# Patient Record
Sex: MALE | Race: WHITE | NOT HISPANIC OR LATINO
[De-identification: names, ages, dates, MRNs, and addresses within clinical notes are randomized per-mention and may not be internally consistent; named-entity substitution may affect disease eponyms.]

---

## 2017-02-07 ENCOUNTER — RECORD ABSTRACTING (OUTPATIENT)
Age: 57
End: 2017-02-07

## 2017-02-07 DIAGNOSIS — M25.562 PAIN IN LEFT KNEE: ICD-10-CM

## 2017-02-07 DIAGNOSIS — Z82.61 FAMILY HISTORY OF ARTHRITIS: ICD-10-CM

## 2017-02-07 DIAGNOSIS — M11.20 OTHER CHONDROCALCINOSIS, UNSPECIFIED SITE: ICD-10-CM

## 2017-02-07 DIAGNOSIS — Z82.62 FAMILY HISTORY OF OSTEOPOROSIS: ICD-10-CM

## 2017-02-07 DIAGNOSIS — M87.9 OSTEONECROSIS, UNSPECIFIED: ICD-10-CM

## 2017-02-13 ENCOUNTER — FORM ENCOUNTER (OUTPATIENT)
Age: 57
End: 2017-02-13

## 2017-02-14 ENCOUNTER — APPOINTMENT (OUTPATIENT)
Dept: CT IMAGING | Facility: HOSPITAL | Age: 57
End: 2017-02-14

## 2017-02-14 ENCOUNTER — OUTPATIENT (OUTPATIENT)
Dept: OUTPATIENT SERVICES | Facility: HOSPITAL | Age: 57
LOS: 1 days | End: 2017-02-14
Payer: COMMERCIAL

## 2017-02-14 DIAGNOSIS — Z98.89 OTHER SPECIFIED POSTPROCEDURAL STATES: Chronic | ICD-10-CM

## 2017-02-14 PROCEDURE — 71250 CT THORAX DX C-: CPT

## 2017-02-16 ENCOUNTER — APPOINTMENT (OUTPATIENT)
Dept: HEMATOLOGY ONCOLOGY | Facility: CLINIC | Age: 57
End: 2017-02-16

## 2017-02-16 VITALS — TEMPERATURE: 98.4 F | SYSTOLIC BLOOD PRESSURE: 134 MMHG | DIASTOLIC BLOOD PRESSURE: 66 MMHG | HEART RATE: 60 BPM

## 2017-02-16 DIAGNOSIS — K76.0 FATTY (CHANGE OF) LIVER, NOT ELSEWHERE CLASSIFIED: ICD-10-CM

## 2017-02-16 DIAGNOSIS — Z47.1 AFTERCARE FOLLOWING JOINT REPLACEMENT SURGERY: ICD-10-CM

## 2017-05-09 ENCOUNTER — APPOINTMENT (OUTPATIENT)
Dept: MRI IMAGING | Facility: CLINIC | Age: 57
End: 2017-05-09

## 2017-05-09 ENCOUNTER — OUTPATIENT (OUTPATIENT)
Dept: OUTPATIENT SERVICES | Facility: HOSPITAL | Age: 57
LOS: 1 days | End: 2017-05-09
Payer: COMMERCIAL

## 2017-05-09 DIAGNOSIS — Z98.89 OTHER SPECIFIED POSTPROCEDURAL STATES: Chronic | ICD-10-CM

## 2017-05-09 DIAGNOSIS — Z00.8 ENCOUNTER FOR OTHER GENERAL EXAMINATION: ICD-10-CM

## 2017-05-09 PROCEDURE — 72148 MRI LUMBAR SPINE W/O DYE: CPT

## 2017-05-22 ENCOUNTER — APPOINTMENT (OUTPATIENT)
Dept: HEMATOLOGY ONCOLOGY | Facility: CLINIC | Age: 57
End: 2017-05-22

## 2017-07-05 ENCOUNTER — APPOINTMENT (OUTPATIENT)
Dept: ORTHOPEDIC SURGERY | Facility: CLINIC | Age: 57
End: 2017-07-05

## 2017-07-05 VITALS
DIASTOLIC BLOOD PRESSURE: 72 MMHG | HEART RATE: 71 BPM | BODY MASS INDEX: 34.37 KG/M2 | HEIGHT: 73.5 IN | SYSTOLIC BLOOD PRESSURE: 122 MMHG | WEIGHT: 265 LBS

## 2017-07-05 DIAGNOSIS — M51.36 OTHER INTERVERTEBRAL DISC DEGENERATION, LUMBAR REGION: ICD-10-CM

## 2017-07-05 DIAGNOSIS — M48.06 SPINAL STENOSIS, LUMBAR REGION: ICD-10-CM

## 2017-08-16 ENCOUNTER — OUTPATIENT (OUTPATIENT)
Dept: OUTPATIENT SERVICES | Facility: HOSPITAL | Age: 57
LOS: 1 days | End: 2017-08-16
Payer: COMMERCIAL

## 2017-08-16 DIAGNOSIS — Z98.89 OTHER SPECIFIED POSTPROCEDURAL STATES: Chronic | ICD-10-CM

## 2017-08-16 DIAGNOSIS — M54.16 RADICULOPATHY, LUMBAR REGION: ICD-10-CM

## 2017-08-16 PROCEDURE — 62323 NJX INTERLAMINAR LMBR/SAC: CPT

## 2017-08-16 PROCEDURE — 77003 FLUOROGUIDE FOR SPINE INJECT: CPT

## 2017-09-26 ENCOUNTER — APPOINTMENT (OUTPATIENT)
Dept: HEMATOLOGY ONCOLOGY | Facility: CLINIC | Age: 57
End: 2017-09-26
Payer: COMMERCIAL

## 2017-09-26 VITALS
BODY MASS INDEX: 31.63 KG/M2 | SYSTOLIC BLOOD PRESSURE: 120 MMHG | TEMPERATURE: 97.7 F | DIASTOLIC BLOOD PRESSURE: 70 MMHG | HEART RATE: 68 BPM | WEIGHT: 243 LBS

## 2017-09-26 PROCEDURE — 99214 OFFICE O/P EST MOD 30 MIN: CPT

## 2018-01-04 ENCOUNTER — APPOINTMENT (OUTPATIENT)
Dept: HEMATOLOGY ONCOLOGY | Facility: CLINIC | Age: 58
End: 2018-01-04

## 2018-01-18 ENCOUNTER — LABORATORY RESULT (OUTPATIENT)
Age: 58
End: 2018-01-18

## 2018-01-19 LAB
CARDIOLIPIN AB SER IA-ACNC: NEGATIVE
CARDIOLIPIN IGM SER-MCNC: 9.6 MPL
CARDIOLIPIN IGM SER-MCNC: <5 GPL
DEPRECATED CARDIOLIPIN IGA SER: <5 APL

## 2018-01-22 ENCOUNTER — OTHER (OUTPATIENT)
Age: 58
End: 2018-01-22

## 2018-01-23 LAB
B2 GLYCOPROT1 IGA SERPL IA-ACNC: <5 SAU
B2 GLYCOPROT1 IGG SER-ACNC: <5 SGU
B2 GLYCOPROT1 IGM SER-ACNC: <5 SMU

## 2018-01-24 ENCOUNTER — FORM ENCOUNTER (OUTPATIENT)
Age: 58
End: 2018-01-24

## 2018-01-25 ENCOUNTER — OUTPATIENT (OUTPATIENT)
Dept: OUTPATIENT SERVICES | Facility: HOSPITAL | Age: 58
LOS: 1 days | End: 2018-01-25
Payer: COMMERCIAL

## 2018-01-25 ENCOUNTER — APPOINTMENT (OUTPATIENT)
Dept: ULTRASOUND IMAGING | Facility: CLINIC | Age: 58
End: 2018-01-25
Payer: COMMERCIAL

## 2018-01-25 DIAGNOSIS — Z98.89 OTHER SPECIFIED POSTPROCEDURAL STATES: Chronic | ICD-10-CM

## 2018-01-25 DIAGNOSIS — I82.401 ACUTE EMBOLISM AND THROMBOSIS OF UNSPECIFIED DEEP VEINS OF RIGHT LOWER EXTREMITY: ICD-10-CM

## 2018-01-25 DIAGNOSIS — Z00.8 ENCOUNTER FOR OTHER GENERAL EXAMINATION: ICD-10-CM

## 2018-01-25 PROCEDURE — 93970 EXTREMITY STUDY: CPT | Mod: 26

## 2018-01-25 PROCEDURE — 93970 EXTREMITY STUDY: CPT

## 2018-01-31 ENCOUNTER — OUTPATIENT (OUTPATIENT)
Dept: OUTPATIENT SERVICES | Facility: HOSPITAL | Age: 58
LOS: 1 days | End: 2018-01-31
Payer: COMMERCIAL

## 2018-01-31 DIAGNOSIS — Z98.89 OTHER SPECIFIED POSTPROCEDURAL STATES: Chronic | ICD-10-CM

## 2018-01-31 DIAGNOSIS — M54.16 RADICULOPATHY, LUMBAR REGION: ICD-10-CM

## 2018-01-31 PROCEDURE — 62323 NJX INTERLAMINAR LMBR/SAC: CPT

## 2018-01-31 PROCEDURE — 77003 FLUOROGUIDE FOR SPINE INJECT: CPT

## 2018-02-14 ENCOUNTER — APPOINTMENT (OUTPATIENT)
Dept: HEMATOLOGY ONCOLOGY | Facility: CLINIC | Age: 58
End: 2018-02-14
Payer: COMMERCIAL

## 2018-02-14 VITALS
SYSTOLIC BLOOD PRESSURE: 130 MMHG | DIASTOLIC BLOOD PRESSURE: 80 MMHG | WEIGHT: 247 LBS | TEMPERATURE: 97.2 F | HEART RATE: 68 BPM | BODY MASS INDEX: 32.15 KG/M2

## 2018-02-14 DIAGNOSIS — R91.1 SOLITARY PULMONARY NODULE: ICD-10-CM

## 2018-02-14 PROCEDURE — 99214 OFFICE O/P EST MOD 30 MIN: CPT

## 2018-04-20 ENCOUNTER — APPOINTMENT (OUTPATIENT)
Dept: ORTHOPEDIC SURGERY | Facility: CLINIC | Age: 58
End: 2018-04-20
Payer: COMMERCIAL

## 2018-04-20 VITALS — BODY MASS INDEX: 30.46 KG/M2 | HEIGHT: 75 IN | WEIGHT: 245 LBS

## 2018-04-20 DIAGNOSIS — Z85.9 PERSONAL HISTORY OF MALIGNANT NEOPLASM, UNSPECIFIED: ICD-10-CM

## 2018-04-20 DIAGNOSIS — Z86.39 PERSONAL HISTORY OF OTHER ENDOCRINE, NUTRITIONAL AND METABOLIC DISEASE: ICD-10-CM

## 2018-04-20 DIAGNOSIS — Z87.39 PERSONAL HISTORY OF OTHER DISEASES OF THE MUSCULOSKELETAL SYSTEM AND CONNECTIVE TISSUE: ICD-10-CM

## 2018-04-20 DIAGNOSIS — Z82.61 FAMILY HISTORY OF ARTHRITIS: ICD-10-CM

## 2018-04-20 PROCEDURE — 99213 OFFICE O/P EST LOW 20 MIN: CPT

## 2018-04-20 PROCEDURE — 73562 X-RAY EXAM OF KNEE 3: CPT | Mod: LT

## 2018-05-17 ENCOUNTER — APPOINTMENT (OUTPATIENT)
Dept: SURGERY | Facility: CLINIC | Age: 58
End: 2018-05-17
Payer: OTHER MISCELLANEOUS

## 2018-05-17 ENCOUNTER — OTHER (OUTPATIENT)
Age: 58
End: 2018-05-17

## 2018-05-17 VITALS
SYSTOLIC BLOOD PRESSURE: 137 MMHG | RESPIRATION RATE: 16 BRPM | WEIGHT: 235 LBS | TEMPERATURE: 98.2 F | DIASTOLIC BLOOD PRESSURE: 78 MMHG | BODY MASS INDEX: 29.22 KG/M2 | OXYGEN SATURATION: 96 % | HEART RATE: 70 BPM | HEIGHT: 75 IN

## 2018-05-17 DIAGNOSIS — K40.90 UNILATERAL INGUINAL HERNIA, W/OUT OBSTRUCTION OR GANGRENE, NOT SPECIFIED AS RECURRENT: ICD-10-CM

## 2018-05-17 DIAGNOSIS — F17.200 NICOTINE DEPENDENCE, UNSPECIFIED, UNCOMPLICATED: ICD-10-CM

## 2018-05-17 PROCEDURE — 99244 OFF/OP CNSLTJ NEW/EST MOD 40: CPT

## 2018-05-21 ENCOUNTER — OTHER (OUTPATIENT)
Age: 58
End: 2018-05-21

## 2018-05-25 ENCOUNTER — OUTPATIENT (OUTPATIENT)
Dept: OUTPATIENT SERVICES | Facility: HOSPITAL | Age: 58
LOS: 1 days | End: 2018-05-25
Payer: COMMERCIAL

## 2018-05-25 VITALS
HEART RATE: 72 BPM | OXYGEN SATURATION: 98 % | DIASTOLIC BLOOD PRESSURE: 76 MMHG | HEIGHT: 75 IN | SYSTOLIC BLOOD PRESSURE: 118 MMHG | WEIGHT: 231.93 LBS | TEMPERATURE: 98 F | RESPIRATION RATE: 16 BRPM

## 2018-05-25 DIAGNOSIS — K40.90 UNILATERAL INGUINAL HERNIA, WITHOUT OBSTRUCTION OR GANGRENE, NOT SPECIFIED AS RECURRENT: ICD-10-CM

## 2018-05-25 DIAGNOSIS — Z96.652 PRESENCE OF LEFT ARTIFICIAL KNEE JOINT: Chronic | ICD-10-CM

## 2018-05-25 DIAGNOSIS — I82.409 ACUTE EMBOLISM AND THROMBOSIS OF UNSPECIFIED DEEP VEINS OF UNSPECIFIED LOWER EXTREMITY: ICD-10-CM

## 2018-05-25 DIAGNOSIS — Z98.89 OTHER SPECIFIED POSTPROCEDURAL STATES: Chronic | ICD-10-CM

## 2018-05-25 DIAGNOSIS — Z01.818 ENCOUNTER FOR OTHER PREPROCEDURAL EXAMINATION: ICD-10-CM

## 2018-05-25 LAB
ALBUMIN SERPL ELPH-MCNC: 4.3 G/DL — SIGNIFICANT CHANGE UP (ref 3.3–5)
ALP SERPL-CCNC: 82 U/L — SIGNIFICANT CHANGE UP (ref 40–120)
ALT FLD-CCNC: 14 U/L — SIGNIFICANT CHANGE UP (ref 10–45)
ANION GAP SERPL CALC-SCNC: 14 MMOL/L — SIGNIFICANT CHANGE UP (ref 5–17)
AST SERPL-CCNC: 13 U/L — SIGNIFICANT CHANGE UP (ref 10–40)
BILIRUB SERPL-MCNC: 0.4 MG/DL — SIGNIFICANT CHANGE UP (ref 0.2–1.2)
BUN SERPL-MCNC: 14 MG/DL — SIGNIFICANT CHANGE UP (ref 7–23)
CALCIUM SERPL-MCNC: 9.5 MG/DL — SIGNIFICANT CHANGE UP (ref 8.4–10.5)
CHLORIDE SERPL-SCNC: 103 MMOL/L — SIGNIFICANT CHANGE UP (ref 96–108)
CO2 SERPL-SCNC: 23 MMOL/L — SIGNIFICANT CHANGE UP (ref 22–31)
CREAT SERPL-MCNC: 0.82 MG/DL — SIGNIFICANT CHANGE UP (ref 0.5–1.3)
GLUCOSE SERPL-MCNC: 88 MG/DL — SIGNIFICANT CHANGE UP (ref 70–99)
HCT VFR BLD CALC: 43.6 % — SIGNIFICANT CHANGE UP (ref 39–50)
HGB BLD-MCNC: 14.8 G/DL — SIGNIFICANT CHANGE UP (ref 13–17)
MCHC RBC-ENTMCNC: 30.6 PG — SIGNIFICANT CHANGE UP (ref 27–34)
MCHC RBC-ENTMCNC: 33.9 GM/DL — SIGNIFICANT CHANGE UP (ref 32–36)
MCV RBC AUTO: 90.1 FL — SIGNIFICANT CHANGE UP (ref 80–100)
PLATELET # BLD AUTO: 275 K/UL — SIGNIFICANT CHANGE UP (ref 150–400)
POTASSIUM SERPL-MCNC: 4 MMOL/L — SIGNIFICANT CHANGE UP (ref 3.5–5.3)
POTASSIUM SERPL-SCNC: 4 MMOL/L — SIGNIFICANT CHANGE UP (ref 3.5–5.3)
PROT SERPL-MCNC: 7.4 G/DL — SIGNIFICANT CHANGE UP (ref 6–8.3)
RBC # BLD: 4.84 M/UL — SIGNIFICANT CHANGE UP (ref 4.2–5.8)
RBC # FLD: 14 % — SIGNIFICANT CHANGE UP (ref 10.3–14.5)
SODIUM SERPL-SCNC: 140 MMOL/L — SIGNIFICANT CHANGE UP (ref 135–145)
WBC # BLD: 10.32 K/UL — SIGNIFICANT CHANGE UP (ref 3.8–10.5)
WBC # FLD AUTO: 10.32 K/UL — SIGNIFICANT CHANGE UP (ref 3.8–10.5)

## 2018-05-25 PROCEDURE — 85027 COMPLETE CBC AUTOMATED: CPT

## 2018-05-25 PROCEDURE — G0463: CPT

## 2018-05-25 PROCEDURE — 80053 COMPREHEN METABOLIC PANEL: CPT

## 2018-05-25 RX ORDER — LIDOCAINE HCL 20 MG/ML
0.2 VIAL (ML) INJECTION ONCE
Qty: 0 | Refills: 0 | Status: DISCONTINUED | OUTPATIENT
Start: 2018-06-01 | End: 2018-06-16

## 2018-05-25 RX ORDER — CEFAZOLIN SODIUM 1 G
2000 VIAL (EA) INJECTION ONCE
Qty: 0 | Refills: 0 | Status: DISCONTINUED | OUTPATIENT
Start: 2018-06-01 | End: 2018-06-16

## 2018-05-25 RX ORDER — ACETAMINOPHEN 500 MG
1000 TABLET ORAL ONCE
Qty: 0 | Refills: 0 | Status: COMPLETED | OUTPATIENT
Start: 2018-06-01 | End: 2018-06-01

## 2018-05-25 RX ORDER — SODIUM CHLORIDE 9 MG/ML
3 INJECTION INTRAMUSCULAR; INTRAVENOUS; SUBCUTANEOUS EVERY 8 HOURS
Qty: 0 | Refills: 0 | Status: DISCONTINUED | OUTPATIENT
Start: 2018-06-01 | End: 2018-06-16

## 2018-05-25 NOTE — H&P PST ADULT - PMH
Basal cell carcinoma  removed  Bronchitis  winter 2014  DVT (deep venous thrombosis)  "right lower extremity, 2015 after meniscus repair & treated with Xarelto" & still on  GERD (gastroesophageal reflux disease)    Hyperlipidemia    Pneumonia Basal cell carcinoma  removed  Bronchitis  winter 2014  DVT (deep venous thrombosis)  "right lower extremity,after meniscus repair  08/2014 & Had Hemato consult((Prothrombin gene mutation study was normal+LA),treated with Xarelto" & currently on xarelto.  GERD (gastroesophageal reflux disease)    Hyperlipidemia    Pneumonia

## 2018-05-25 NOTE — H&P PST ADULT - PSH
H/O total knee replacement, left  2016  S/P knee surgery  2013 left meniscus  2015-right meniscus  S/P laminectomy  1990

## 2018-05-25 NOTE — H&P PST ADULT - HISTORY OF PRESENT ILLNESS
58 year old male presents today with a " right inguinal hernia since 05/2017 while lifting some heavy weight while working & noticed its bothering too much" & scheduled for right inguinal hernia repair on 06/01/18.

## 2018-05-25 NOTE — H&P PST ADULT - NSANTHOSAYNRD_GEN_A_CORE
No. BRIDGER screening performed.  STOP BANG Legend: 0-2 = LOW Risk; 3-4 = INTERMEDIATE Risk; 5-8 = HIGH Risk

## 2018-05-25 NOTE — H&P PST ADULT - PROBLEM SELECTOR PLAN 2
Instructed to continue meds &  off Xarelto 3 days pre op as per PMD & surgeon's advice to patient(last dose 05/28/18.

## 2018-05-31 ENCOUNTER — TRANSCRIPTION ENCOUNTER (OUTPATIENT)
Age: 58
End: 2018-05-31

## 2018-06-01 ENCOUNTER — APPOINTMENT (OUTPATIENT)
Dept: SURGERY | Facility: HOSPITAL | Age: 58
End: 2018-06-01
Payer: COMMERCIAL

## 2018-06-01 ENCOUNTER — OUTPATIENT (OUTPATIENT)
Dept: OUTPATIENT SERVICES | Facility: HOSPITAL | Age: 58
LOS: 1 days | End: 2018-06-01
Payer: COMMERCIAL

## 2018-06-01 ENCOUNTER — RESULT REVIEW (OUTPATIENT)
Age: 58
End: 2018-06-01

## 2018-06-01 VITALS
TEMPERATURE: 97 F | OXYGEN SATURATION: 97 % | HEART RATE: 60 BPM | SYSTOLIC BLOOD PRESSURE: 122 MMHG | DIASTOLIC BLOOD PRESSURE: 76 MMHG | RESPIRATION RATE: 15 BRPM

## 2018-06-01 VITALS
DIASTOLIC BLOOD PRESSURE: 88 MMHG | WEIGHT: 231.93 LBS | HEART RATE: 64 BPM | HEIGHT: 75 IN | TEMPERATURE: 98 F | RESPIRATION RATE: 20 BRPM | SYSTOLIC BLOOD PRESSURE: 134 MMHG | OXYGEN SATURATION: 98 %

## 2018-06-01 DIAGNOSIS — Z98.89 OTHER SPECIFIED POSTPROCEDURAL STATES: Chronic | ICD-10-CM

## 2018-06-01 DIAGNOSIS — K40.90 UNILATERAL INGUINAL HERNIA, WITHOUT OBSTRUCTION OR GANGRENE, NOT SPECIFIED AS RECURRENT: ICD-10-CM

## 2018-06-01 DIAGNOSIS — Z96.652 PRESENCE OF LEFT ARTIFICIAL KNEE JOINT: Chronic | ICD-10-CM

## 2018-06-01 PROCEDURE — 88302 TISSUE EXAM BY PATHOLOGIST: CPT

## 2018-06-01 PROCEDURE — 88302 TISSUE EXAM BY PATHOLOGIST: CPT | Mod: 26

## 2018-06-01 PROCEDURE — 88304 TISSUE EXAM BY PATHOLOGIST: CPT | Mod: 26

## 2018-06-01 PROCEDURE — 49505 PRP I/HERN INIT REDUC >5 YR: CPT | Mod: RT

## 2018-06-01 PROCEDURE — 88304 TISSUE EXAM BY PATHOLOGIST: CPT

## 2018-06-01 PROCEDURE — 55520 REMOVAL OF SPERM CORD LESION: CPT | Mod: 59,RT

## 2018-06-01 PROCEDURE — C1781: CPT

## 2018-06-01 RX ORDER — ONDANSETRON 8 MG/1
4 TABLET, FILM COATED ORAL ONCE
Qty: 0 | Refills: 0 | Status: DISCONTINUED | OUTPATIENT
Start: 2018-06-01 | End: 2018-06-16

## 2018-06-01 RX ORDER — CELECOXIB 200 MG/1
200 CAPSULE ORAL ONCE
Qty: 0 | Refills: 0 | Status: COMPLETED | OUTPATIENT
Start: 2018-06-01 | End: 2018-06-01

## 2018-06-01 RX ORDER — HYDROMORPHONE HYDROCHLORIDE 2 MG/ML
0.5 INJECTION INTRAMUSCULAR; INTRAVENOUS; SUBCUTANEOUS
Qty: 0 | Refills: 0 | Status: DISCONTINUED | OUTPATIENT
Start: 2018-06-01 | End: 2018-06-01

## 2018-06-01 RX ORDER — SODIUM CHLORIDE 9 MG/ML
1000 INJECTION, SOLUTION INTRAVENOUS
Qty: 0 | Refills: 0 | Status: DISCONTINUED | OUTPATIENT
Start: 2018-06-01 | End: 2018-06-16

## 2018-06-01 RX ORDER — OXYCODONE HYDROCHLORIDE 5 MG/1
5 TABLET ORAL ONCE
Qty: 0 | Refills: 0 | Status: DISCONTINUED | OUTPATIENT
Start: 2018-06-01 | End: 2018-06-01

## 2018-06-01 RX ADMIN — CELECOXIB 200 MILLIGRAM(S): 200 CAPSULE ORAL at 11:45

## 2018-06-01 RX ADMIN — CELECOXIB 200 MILLIGRAM(S): 200 CAPSULE ORAL at 14:52

## 2018-06-01 RX ADMIN — Medication 1000 MILLIGRAM(S): at 11:45

## 2018-06-01 NOTE — PRE-ANESTHESIA EVALUATION ADULT - NSANTHPMHFT_GEN_ALL_CORE
DVT - on xarelto  genetic study normal  GERD - mild controlled  no recent exacerbation of bronchitis or PNA  no SOB, CP or syncope

## 2018-06-01 NOTE — ASU PATIENT PROFILE, ADULT - PMH
Basal cell carcinoma  removed  Bronchitis  winter 2014  DVT (deep venous thrombosis)  "right lower extremity,after meniscus repair  08/2014 & Had Hemato consult((Prothrombin gene mutation study was normal+LA),treated with Xarelto" & currently on xarelto.  GERD (gastroesophageal reflux disease)    Hyperlipidemia    Pneumonia Basal cell carcinoma  removed  Bronchitis  winter 2014  DVT (deep venous thrombosis)  "right lower extremity,after meniscus repair  08/2014 & Had Hemato consult (Prothrombin gene mutation study was normal+LA),treated with Xarelto" & currently on xarelto.  GERD (gastroesophageal reflux disease)    Hyperlipidemia    Pneumonia

## 2018-06-01 NOTE — ASU DISCHARGE PLAN (ADULT/PEDIATRIC). - MEDICATION SUMMARY - MEDICATIONS TO TAKE
I will START or STAY ON the medications listed below when I get home from the hospital:    Xarelto 20 mg oral tablet  -- 1 tab(s) by mouth once a day (in the evening)  -- Indication: For History of PE and DVT    atorvastatin 20 mg oral tablet  -- 1 tab(s) by mouth once a day (at bedtime)  -- Indication: For Hyperlipidemia    PriLOSEC 40 mg oral delayed release capsule  -- 1 cap(s) by mouth once a day  -- Indication: For Reflux

## 2018-06-01 NOTE — ASU DISCHARGE PLAN (ADULT/PEDIATRIC). - ITEMS TO FOLLOWUP WITH YOUR PHYSICIAN'S
Please follow up with Dr. Cruz within 2 weeks of discharge, please call outpatient office to set up the appointment

## 2018-06-05 LAB — SURGICAL PATHOLOGY STUDY: SIGNIFICANT CHANGE UP

## 2018-06-18 PROBLEM — Z09 POSTOPERATIVE EXAMINATION: Status: ACTIVE | Noted: 2018-06-18

## 2018-06-19 ENCOUNTER — APPOINTMENT (OUTPATIENT)
Dept: SURGERY | Facility: CLINIC | Age: 58
End: 2018-06-19
Payer: COMMERCIAL

## 2018-06-19 VITALS
HEIGHT: 75 IN | HEART RATE: 76 BPM | RESPIRATION RATE: 16 BRPM | BODY MASS INDEX: 29.22 KG/M2 | SYSTOLIC BLOOD PRESSURE: 138 MMHG | WEIGHT: 235 LBS | OXYGEN SATURATION: 98 % | DIASTOLIC BLOOD PRESSURE: 72 MMHG | TEMPERATURE: 98 F

## 2018-06-19 DIAGNOSIS — Z09 ENCOUNTER FOR FOLLOW-UP EXAMINATION AFTER COMPLETED TREATMENT FOR CONDITIONS OTHER THAN MALIGNANT NEOPLASM: ICD-10-CM

## 2018-06-19 PROCEDURE — 99024 POSTOP FOLLOW-UP VISIT: CPT

## 2018-06-19 RX ORDER — OXYCODONE AND ACETAMINOPHEN 5; 325 MG/1; MG/1
5-325 TABLET ORAL
Qty: 24 | Refills: 0 | Status: DISCONTINUED | COMMUNITY
Start: 2018-06-01 | End: 2018-06-19

## 2018-08-15 ENCOUNTER — APPOINTMENT (OUTPATIENT)
Dept: HEMATOLOGY ONCOLOGY | Facility: CLINIC | Age: 58
End: 2018-08-15

## 2018-12-11 ENCOUNTER — INPATIENT (INPATIENT)
Facility: HOSPITAL | Age: 58
LOS: 0 days | Discharge: HOME | DRG: 872 | End: 2018-12-12
Attending: STUDENT IN AN ORGANIZED HEALTH CARE EDUCATION/TRAINING PROGRAM | Admitting: HOSPITALIST
Payer: COMMERCIAL

## 2018-12-11 VITALS
DIASTOLIC BLOOD PRESSURE: 84 MMHG | TEMPERATURE: 100 F | RESPIRATION RATE: 20 BRPM | HEART RATE: 102 BPM | SYSTOLIC BLOOD PRESSURE: 104 MMHG | WEIGHT: 235.01 LBS | HEIGHT: 74 IN | OXYGEN SATURATION: 96 %

## 2018-12-11 DIAGNOSIS — R09.02 HYPOXEMIA: ICD-10-CM

## 2018-12-11 DIAGNOSIS — Z98.89 OTHER SPECIFIED POSTPROCEDURAL STATES: Chronic | ICD-10-CM

## 2018-12-11 DIAGNOSIS — Z96.652 PRESENCE OF LEFT ARTIFICIAL KNEE JOINT: Chronic | ICD-10-CM

## 2018-12-11 LAB
ALBUMIN SERPL ELPH-MCNC: 4.3 G/DL — SIGNIFICANT CHANGE UP (ref 3.3–5)
ALP SERPL-CCNC: 80 U/L — SIGNIFICANT CHANGE UP (ref 40–120)
ALT FLD-CCNC: 16 U/L — SIGNIFICANT CHANGE UP (ref 10–45)
ANION GAP SERPL CALC-SCNC: 14 MMOL/L — SIGNIFICANT CHANGE UP (ref 5–17)
APPEARANCE UR: CLEAR — SIGNIFICANT CHANGE UP
APTT BLD: 34.1 SEC — SIGNIFICANT CHANGE UP (ref 27.5–36.3)
AST SERPL-CCNC: 16 U/L — SIGNIFICANT CHANGE UP (ref 10–40)
BACTERIA # UR AUTO: NEGATIVE — SIGNIFICANT CHANGE UP
BASOPHILS # BLD AUTO: 0 K/UL — SIGNIFICANT CHANGE UP (ref 0–0.2)
BASOPHILS NFR BLD AUTO: 0 % — SIGNIFICANT CHANGE UP (ref 0–2)
BILIRUB SERPL-MCNC: 0.3 MG/DL — SIGNIFICANT CHANGE UP (ref 0.2–1.2)
BILIRUB UR-MCNC: NEGATIVE — SIGNIFICANT CHANGE UP
BUN SERPL-MCNC: 14 MG/DL — SIGNIFICANT CHANGE UP (ref 7–23)
CALCIUM SERPL-MCNC: 9.1 MG/DL — SIGNIFICANT CHANGE UP (ref 8.4–10.5)
CHLORIDE SERPL-SCNC: 99 MMOL/L — SIGNIFICANT CHANGE UP (ref 96–108)
CO2 SERPL-SCNC: 23 MMOL/L — SIGNIFICANT CHANGE UP (ref 22–31)
COLOR SPEC: YELLOW — SIGNIFICANT CHANGE UP
CREAT SERPL-MCNC: 0.9 MG/DL — SIGNIFICANT CHANGE UP (ref 0.5–1.3)
DIFF PNL FLD: NEGATIVE — SIGNIFICANT CHANGE UP
EOSINOPHIL # BLD AUTO: 0.1 K/UL — SIGNIFICANT CHANGE UP (ref 0–0.5)
EOSINOPHIL NFR BLD AUTO: 0 % — SIGNIFICANT CHANGE UP (ref 0–6)
EPI CELLS # UR: 0 /HPF — SIGNIFICANT CHANGE UP
FLUAV H1 2009 PAND RNA SPEC QL NAA+PROBE: DETECTED
GAS PNL BLDV: SIGNIFICANT CHANGE UP
GLUCOSE SERPL-MCNC: 97 MG/DL — SIGNIFICANT CHANGE UP (ref 70–99)
GLUCOSE UR QL: NEGATIVE — SIGNIFICANT CHANGE UP
HCT VFR BLD CALC: 45.3 % — SIGNIFICANT CHANGE UP (ref 39–50)
HGB BLD-MCNC: 15.2 G/DL — SIGNIFICANT CHANGE UP (ref 13–17)
HYALINE CASTS # UR AUTO: 3 /LPF — HIGH (ref 0–2)
INR BLD: 1.41 RATIO — HIGH (ref 0.88–1.16)
KETONES UR-MCNC: ABNORMAL
LEUKOCYTE ESTERASE UR-ACNC: NEGATIVE — SIGNIFICANT CHANGE UP
LYMPHOCYTES # BLD AUTO: 1 K/UL — SIGNIFICANT CHANGE UP (ref 1–3.3)
LYMPHOCYTES # BLD AUTO: 12 % — LOW (ref 13–44)
MCHC RBC-ENTMCNC: 30.5 PG — SIGNIFICANT CHANGE UP (ref 27–34)
MCHC RBC-ENTMCNC: 33.6 GM/DL — SIGNIFICANT CHANGE UP (ref 32–36)
MCV RBC AUTO: 91 FL — SIGNIFICANT CHANGE UP (ref 80–100)
MONOCYTES # BLD AUTO: 1.6 K/UL — HIGH (ref 0–0.9)
MONOCYTES NFR BLD AUTO: 20 % — HIGH (ref 2–14)
NEUTROPHILS # BLD AUTO: 5.7 K/UL — SIGNIFICANT CHANGE UP (ref 1.8–7.4)
NEUTROPHILS NFR BLD AUTO: 64 % — SIGNIFICANT CHANGE UP (ref 43–77)
NITRITE UR-MCNC: NEGATIVE — SIGNIFICANT CHANGE UP
PH UR: 6 — SIGNIFICANT CHANGE UP (ref 5–8)
PLATELET # BLD AUTO: 227 K/UL — SIGNIFICANT CHANGE UP (ref 150–400)
POTASSIUM SERPL-MCNC: 4 MMOL/L — SIGNIFICANT CHANGE UP (ref 3.5–5.3)
POTASSIUM SERPL-SCNC: 4 MMOL/L — SIGNIFICANT CHANGE UP (ref 3.5–5.3)
PROCALCITONIN SERPL-MCNC: 0.03 NG/ML — SIGNIFICANT CHANGE UP (ref 0.02–0.1)
PROT SERPL-MCNC: 7.4 G/DL — SIGNIFICANT CHANGE UP (ref 6–8.3)
PROT UR-MCNC: ABNORMAL
PROTHROM AB SERPL-ACNC: 16.2 SEC — HIGH (ref 10–12.9)
RAPID RVP RESULT: DETECTED
RBC # BLD: 4.98 M/UL — SIGNIFICANT CHANGE UP (ref 4.2–5.8)
RBC # FLD: 12.8 % — SIGNIFICANT CHANGE UP (ref 10.3–14.5)
RBC CASTS # UR COMP ASSIST: 6 /HPF — HIGH (ref 0–4)
SODIUM SERPL-SCNC: 136 MMOL/L — SIGNIFICANT CHANGE UP (ref 135–145)
SP GR SPEC: 1.03 — HIGH (ref 1.01–1.02)
UROBILINOGEN FLD QL: NEGATIVE — SIGNIFICANT CHANGE UP
WBC # BLD: 8.5 K/UL — SIGNIFICANT CHANGE UP (ref 3.8–10.5)
WBC # FLD AUTO: 8.5 K/UL — SIGNIFICANT CHANGE UP (ref 3.8–10.5)
WBC UR QL: 2 /HPF — SIGNIFICANT CHANGE UP (ref 0–5)

## 2018-12-11 PROCEDURE — 99223 1ST HOSP IP/OBS HIGH 75: CPT | Mod: 25

## 2018-12-11 PROCEDURE — 99406 BEHAV CHNG SMOKING 3-10 MIN: CPT

## 2018-12-11 PROCEDURE — 71046 X-RAY EXAM CHEST 2 VIEWS: CPT | Mod: 26

## 2018-12-11 PROCEDURE — 99285 EMERGENCY DEPT VISIT HI MDM: CPT

## 2018-12-11 RX ORDER — SODIUM CHLORIDE 9 MG/ML
2000 INJECTION INTRAMUSCULAR; INTRAVENOUS; SUBCUTANEOUS ONCE
Qty: 0 | Refills: 0 | Status: DISCONTINUED | OUTPATIENT
Start: 2018-12-11 | End: 2018-12-11

## 2018-12-11 RX ORDER — SODIUM CHLORIDE 9 MG/ML
2000 INJECTION, SOLUTION INTRAVENOUS ONCE
Qty: 0 | Refills: 0 | Status: COMPLETED | OUTPATIENT
Start: 2018-12-11 | End: 2018-12-11

## 2018-12-11 RX ORDER — CEFTRIAXONE 500 MG/1
1 INJECTION, POWDER, FOR SOLUTION INTRAMUSCULAR; INTRAVENOUS ONCE
Qty: 0 | Refills: 0 | Status: COMPLETED | OUTPATIENT
Start: 2018-12-11 | End: 2018-12-11

## 2018-12-11 RX ORDER — INFLUENZA VIRUS VACCINE 15; 15; 15; 15 UG/.5ML; UG/.5ML; UG/.5ML; UG/.5ML
0.5 SUSPENSION INTRAMUSCULAR ONCE
Qty: 0 | Refills: 0 | Status: DISCONTINUED | OUTPATIENT
Start: 2018-12-11 | End: 2018-12-12

## 2018-12-11 RX ORDER — AZITHROMYCIN 500 MG/1
500 TABLET, FILM COATED ORAL ONCE
Qty: 0 | Refills: 0 | Status: COMPLETED | OUTPATIENT
Start: 2018-12-11 | End: 2018-12-11

## 2018-12-11 RX ORDER — IPRATROPIUM/ALBUTEROL SULFATE 18-103MCG
3 AEROSOL WITH ADAPTER (GRAM) INHALATION ONCE
Qty: 0 | Refills: 0 | Status: COMPLETED | OUTPATIENT
Start: 2018-12-11 | End: 2018-12-11

## 2018-12-11 RX ORDER — ACETAMINOPHEN 500 MG
975 TABLET ORAL ONCE
Qty: 0 | Refills: 0 | Status: COMPLETED | OUTPATIENT
Start: 2018-12-11 | End: 2018-12-11

## 2018-12-11 RX ADMIN — Medication 75 MILLIGRAM(S): at 21:02

## 2018-12-11 RX ADMIN — AZITHROMYCIN 250 MILLIGRAM(S): 500 TABLET, FILM COATED ORAL at 21:02

## 2018-12-11 RX ADMIN — Medication 3 MILLILITER(S): at 21:03

## 2018-12-11 RX ADMIN — Medication 975 MILLIGRAM(S): at 18:37

## 2018-12-11 RX ADMIN — CEFTRIAXONE 100 GRAM(S): 500 INJECTION, POWDER, FOR SOLUTION INTRAMUSCULAR; INTRAVENOUS at 19:56

## 2018-12-11 RX ADMIN — SODIUM CHLORIDE 2000 MILLILITER(S): 9 INJECTION, SOLUTION INTRAVENOUS at 18:37

## 2018-12-11 RX ADMIN — Medication 3 MILLILITER(S): at 19:56

## 2018-12-11 NOTE — H&P ADULT - NSHPSOCIALHISTORY_GEN_ALL_CORE
Lives at home  Works as peck at [employer]  Current smoker 1ppd  occasional EtOH use  Denies illicit drug use

## 2018-12-11 NOTE — H&P ADULT - NSHPPHYSICALEXAM_GEN_ALL_CORE
Vital Signs Last 24 Hrs  T(C): 37.2 (11 Dec 2018 22:35), Max: 38.5 (11 Dec 2018 18:25)  T(F): 98.9 (11 Dec 2018 22:35), Max: 101.3 (11 Dec 2018 18:25)  HR: 98 (11 Dec 2018 22:35) (93 - 110)  BP: 117/80 (11 Dec 2018 22:35) (104/84 - 130/80)  BP(mean): --  RR: 18 (11 Dec 2018 22:35) (16 - 20)  SpO2: 93% (11 Dec 2018 22:35) (93% - 98%)

## 2018-12-11 NOTE — H&P ADULT - HISTORY OF PRESENT ILLNESS
57 yo man with PMH of HLD, GERD, hx of RLE DVT in setting of knee surgery on Xarelto, current smoker presenting with cough of 5 days and subjective fevers and chills of 1 day. Patient states that in the past day he has been feeling extremely weak, fatigue, general body aches with fevers and chills. He went to urgent care center and was referred to the ED for evaluation of possible PNA. Patient states he has a dry cough and sore throat. Denies rhinorrhea nasal congestion, headache. Denies sick contacts. Denies recent travel.

## 2018-12-11 NOTE — H&P ADULT - NSHPLABSRESULTS_GEN_ALL_CORE
Personally reviewed labs and noted in detail below Personally reviewed labs and noted in detail below    Personally reviewed CXR: no appreciable focal consolidations Personally reviewed labs and noted in detail below    Personally reviewed CXR: no appreciable focal consolidations    Personally reviewed EKG: SR 80s QTc 413 no ST segment changes

## 2018-12-11 NOTE — ED PROVIDER NOTE - MEDICAL DECISION MAKING DETAILS
ATTG: : cough concern for pna, will recheck xray, ivf, antipyretics, likely admit to hospital for continued care and monitoring.

## 2018-12-11 NOTE — H&P ADULT - RS GEN PE MLT RESP DETAILS PC
wheezes/airway patent/good air movement/no rales/respirations non-labored/no intercostal retractions/faint

## 2018-12-11 NOTE — H&P ADULT - PROBLEM SELECTOR PLAN 3
Pt has existing nicotine dependence with ~20 PPY of cigarettes.  Has quit for 2 years and resumed  Amendable to quitting  Risks of tobacco/nicotine usage discussed, including cardiac disease and cancer, as well as the effect on existing comorbidities.   Patch offerred, patient deferred  5 minutes spent at bedside counseling on nicotine cessation.

## 2018-12-11 NOTE — H&P ADULT - PROBLEM SELECTOR PLAN 1
RVP detected  S/P Tamiflu 75.   -Continue with Tamiflu for course of 5 days  Supportive measures with:  -antitussives and antipyretics prn  -duonebs ATC in setting of wheezing  -Supplemental O2 prn as patient reported to have SpO2 87% on RA in ED

## 2018-12-11 NOTE — ED PROVIDER NOTE - OBJECTIVE STATEMENT
57 y/o male PMHx HLD, knee replacement c/b DVT on xarelto 2015 was directed to the ED by Urgent Care c/o cough x5 days and subjective fevers x1 day.   Patient stated he has nonproductive cough  with associated SOB at rest. Patient has had subjective fever, chills, and diffuse myalgias. Patient also reported one episodes of bilious emesis and nausea. Patient denied sick contact, recent travel, CP, abdominal pain, weakness, dizziness, poor po intake 57 y/o male PMHx HLD, knee replacement c/b DVT on xarelto 2015, current [id number] year smoker was directed to the ED by Urgent Care c/o cough x5 days and subjective fevers x1 day.  Patient stated he has nonproductive cough  with associated SOB at rest. Patient has had subjective fever, chills, and diffuse myalgias. Patient also reported one episodes of bilious emesis and nausea. Patient denied sick contact, recent travel, CP, abdominal pain, weakness, dizziness, poor po intake

## 2018-12-11 NOTE — H&P ADULT - PROBLEM SELECTOR PLAN 2
Patient meets sepsis criteria with fever and tachycardia likely in setting of +Influenza  s/p 2L LR, ceftriaxone and azithromycin in ED  Low suspicion for PNA as patient exam not suggestive of PNA, no findings on CXR, Procalcitonin low.  -monitor off further abx  -Continue with maintenance fluids  -F/U blood cultures  -Supportive measures for flu

## 2018-12-11 NOTE — H&P ADULT - PROBLEM SELECTOR PLAN 5
Per patient he was told he may have "lupus clotting factor" by hematologist  Continue home regimen on Xarelto 20 qD

## 2018-12-11 NOTE — ED PROVIDER NOTE - ATTENDING CONTRIBUTION TO CARE
57 y/o m with pmhx HLD, DVT on Xarelto, current smoker [id number] year smoker, sent to ED for low O2 after eval for cough. Patient state was feeling chills and not well over the weekend. yesterday worsening symptoms. nausea with 1 episode of bilious non bloody vomiting. no diarrhea. no abd pain. no chest pain or heart palp. no sore throat no ear ache. no neck pain. no nasal congestion. daughter with him at the bedside. no urinary complaints.  Patient denied sick contact, recent travel, CP, abdominal pain, weakness, dizziness, poor po intake. Xray done at Urgent care with possible atelectasis on left base. daughter states this was telemed prelim read.   ATTD: Dr. Block  Gen.  no acute distress  HEENT:  PERRL EOMI   Lungs:  b/l bs, rhonchi at bases, right greater than left side. no retractions. O2 sat on room air 93%.   CVS: S1S2   Abd;  soft non tender. no distention. no guarding.   Ext: no pitting edema  Neuro: aaox3 no focal deficits  MSK: 5/5 x 4 ext

## 2018-12-11 NOTE — ED ADULT TRIAGE NOTE - CHIEF COMPLAINT QUOTE
Pt started with cough, fevers, chills, malaise, body aches, nausea, vomiting x 1 this am, and SOB since yesterday.  Last took Tylenol at 1400, went to Urgent Care, CXR shows PNA vs atelectasis. Rapid flu was neg at Urgent Care.

## 2018-12-11 NOTE — ED PROVIDER NOTE - PMH
Basal cell carcinoma  removed  Bronchitis  winter 2014  DVT (deep venous thrombosis)  "right lower extremity,after meniscus repair  08/2014 & Had Hemato consult (Prothrombin gene mutation study was normal+LA),treated with Xarelto" & currently on xarelto.  GERD (gastroesophageal reflux disease)    Hyperlipidemia    Pneumonia

## 2018-12-11 NOTE — H&P ADULT - ATTENDING COMMENTS
Patient was previously unknown to me. Patient was assigned to me by hospitalist in charge. My involvement in this case consisted of initial history, physical and management plan. Primary medicine day team to assume care in AM and therafter. Case discussed in detail with overnight medicine NP/PA. Madiha [id number] Patient was previously unknown to me. Patient was assigned to me by hospitalist in charge. My involvement in this case consisted of initial history, physical and management plan. Primary medicine day team to assume care in AM and thereafter. Case discussed in detail with overnight medicine NP/PA. Violeta [id number]

## 2018-12-11 NOTE — H&P ADULT - ASSESSMENT
59 yo man with PMH of HLD, GERD, hx of RLE DVT in setting of knee surgery on Xarelto, current smoker presenting with cough of 5 days and subjective fevers and chills of 1 day found with influenza

## 2018-12-11 NOTE — ED ADULT NURSE NOTE - OBJECTIVE STATEMENT
77y Male PMH CAD with stents, DM, GIB, CABG, presents to the ED c/o flu like symptoms. Pt reporting body aches with nonproductive cough X 4-5 days. Pt reports onset of chills yesterday with subjective fevers. Pt also reports 1 episode of emesis yesterday. Pt reporting mild nausea at this time and nasal congestion. Pt reporting SOB at rest. Pt was seen at urgent care today and had negative flu swab. Pt diagnosed with pneumonia and atelectasis. Pt pack a day smoker X30 years. Pt presents a&oX4, airway intact, breathing spontaneously and unlabored, pt has diminished breath sounds upper lobes and clear to ausculation in lower bilateral lobes, abd soft nondistended nontender to palpation, pt warm to touch, febrile at 101.3 orally, 77y Male PMH CAD with stents, DM, GIB, CABG, presents to the ED c/o flu like symptoms. Pt reporting body aches with nonproductive cough X 4-5 days. Pt reports onset of chills yesterday with subjective fevers. Pt also reports 1 episode of emesis yesterday. Pt reporting mild nausea at this time and nasal congestion. Pt reporting SOB at rest. Pt was seen at urgent care today and had negative flu swab. Pt diagnosed with pneumonia and atelectasis. Pt pack a day smoker X30 years. Pt presents a&oX4, airway intact, breathing spontaneously and unlabored, pt has diminished breath sounds upper lobes and clear to ausculation in lower bilateral lobes, abd soft nondistended nontender to palpation, pt warm to touch, febrile at 101.3 orally, skin dry and intact, cap refill <3 seconds, +peripheral pulses, pt denies CP, ha, dizziness, abd pain, diarrhea, sick contact, recent travel, dysuria, hematuria. MD at bedside for eval. safety maintained.

## 2018-12-12 ENCOUNTER — TRANSCRIPTION ENCOUNTER (OUTPATIENT)
Age: 58
End: 2018-12-12

## 2018-12-12 VITALS
OXYGEN SATURATION: 99 % | TEMPERATURE: 99 F | HEART RATE: 99 BPM | RESPIRATION RATE: 16 BRPM | SYSTOLIC BLOOD PRESSURE: 133 MMHG | DIASTOLIC BLOOD PRESSURE: 69 MMHG

## 2018-12-12 DIAGNOSIS — F17.210 NICOTINE DEPENDENCE, CIGARETTES, UNCOMPLICATED: ICD-10-CM

## 2018-12-12 DIAGNOSIS — A41.9 SEPSIS, UNSPECIFIED ORGANISM: ICD-10-CM

## 2018-12-12 DIAGNOSIS — E78.5 HYPERLIPIDEMIA, UNSPECIFIED: ICD-10-CM

## 2018-12-12 DIAGNOSIS — Z86.718 PERSONAL HISTORY OF OTHER VENOUS THROMBOSIS AND EMBOLISM: ICD-10-CM

## 2018-12-12 DIAGNOSIS — J10.1 INFLUENZA DUE TO OTHER IDENTIFIED INFLUENZA VIRUS WITH OTHER RESPIRATORY MANIFESTATIONS: ICD-10-CM

## 2018-12-12 PROBLEM — I82.409 ACUTE EMBOLISM AND THROMBOSIS OF UNSPECIFIED DEEP VEINS OF UNSPECIFIED LOWER EXTREMITY: Chronic | Status: ACTIVE | Noted: 2018-05-25

## 2018-12-12 LAB
ANION GAP SERPL CALC-SCNC: 14 MMOL/L — SIGNIFICANT CHANGE UP (ref 5–17)
BASOPHILS # BLD AUTO: 0.06 K/UL — SIGNIFICANT CHANGE UP (ref 0–0.2)
BASOPHILS NFR BLD AUTO: 0.9 % — SIGNIFICANT CHANGE UP (ref 0–2)
BUN SERPL-MCNC: 9 MG/DL — SIGNIFICANT CHANGE UP (ref 7–23)
CALCIUM SERPL-MCNC: 8 MG/DL — LOW (ref 8.4–10.5)
CHLORIDE SERPL-SCNC: 101 MMOL/L — SIGNIFICANT CHANGE UP (ref 96–108)
CO2 SERPL-SCNC: 22 MMOL/L — SIGNIFICANT CHANGE UP (ref 22–31)
CREAT SERPL-MCNC: 0.72 MG/DL — SIGNIFICANT CHANGE UP (ref 0.5–1.3)
EOSINOPHIL # BLD AUTO: 0 K/UL — SIGNIFICANT CHANGE UP (ref 0–0.5)
EOSINOPHIL NFR BLD AUTO: 0 % — SIGNIFICANT CHANGE UP (ref 0–6)
GLUCOSE SERPL-MCNC: 89 MG/DL — SIGNIFICANT CHANGE UP (ref 70–99)
HCT VFR BLD CALC: 39.7 % — SIGNIFICANT CHANGE UP (ref 39–50)
HGB BLD-MCNC: 12.8 G/DL — LOW (ref 13–17)
LYMPHOCYTES # BLD AUTO: 0.67 K/UL — LOW (ref 1–3.3)
LYMPHOCYTES # BLD AUTO: 10.7 % — LOW (ref 13–44)
MAGNESIUM SERPL-MCNC: 1.7 MG/DL — SIGNIFICANT CHANGE UP (ref 1.6–2.6)
MANUAL SMEAR VERIFICATION: SIGNIFICANT CHANGE UP
MCHC RBC-ENTMCNC: 29.3 PG — SIGNIFICANT CHANGE UP (ref 27–34)
MCHC RBC-ENTMCNC: 32.2 GM/DL — SIGNIFICANT CHANGE UP (ref 32–36)
MCV RBC AUTO: 90.8 FL — SIGNIFICANT CHANGE UP (ref 80–100)
MONOCYTES # BLD AUTO: 1.45 K/UL — HIGH (ref 0–0.9)
MONOCYTES NFR BLD AUTO: 23.2 % — HIGH (ref 2–14)
NEUTROPHILS # BLD AUTO: 4.09 K/UL — SIGNIFICANT CHANGE UP (ref 1.8–7.4)
NEUTROPHILS NFR BLD AUTO: 65.2 % — SIGNIFICANT CHANGE UP (ref 43–77)
PHOSPHATE SERPL-MCNC: 3.1 MG/DL — SIGNIFICANT CHANGE UP (ref 2.5–4.5)
PLAT MORPH BLD: NORMAL — SIGNIFICANT CHANGE UP
PLATELET # BLD AUTO: 201 K/UL — SIGNIFICANT CHANGE UP (ref 150–400)
POTASSIUM SERPL-MCNC: 3.9 MMOL/L — SIGNIFICANT CHANGE UP (ref 3.5–5.3)
POTASSIUM SERPL-SCNC: 3.9 MMOL/L — SIGNIFICANT CHANGE UP (ref 3.5–5.3)
RBC # BLD: 4.37 M/UL — SIGNIFICANT CHANGE UP (ref 4.2–5.8)
RBC # FLD: 14.4 % — SIGNIFICANT CHANGE UP (ref 10.3–14.5)
RBC BLD AUTO: SIGNIFICANT CHANGE UP
SMUDGE CELLS # BLD: PRESENT — SIGNIFICANT CHANGE UP
SODIUM SERPL-SCNC: 137 MMOL/L — SIGNIFICANT CHANGE UP (ref 135–145)
WBC # BLD: 6.27 K/UL — SIGNIFICANT CHANGE UP (ref 3.8–10.5)
WBC # FLD AUTO: 6.27 K/UL — SIGNIFICANT CHANGE UP (ref 3.8–10.5)

## 2018-12-12 PROCEDURE — 82330 ASSAY OF CALCIUM: CPT

## 2018-12-12 PROCEDURE — 82803 BLOOD GASES ANY COMBINATION: CPT

## 2018-12-12 PROCEDURE — 82947 ASSAY GLUCOSE BLOOD QUANT: CPT

## 2018-12-12 PROCEDURE — 84295 ASSAY OF SERUM SODIUM: CPT

## 2018-12-12 PROCEDURE — 85014 HEMATOCRIT: CPT

## 2018-12-12 PROCEDURE — 96375 TX/PRO/DX INJ NEW DRUG ADDON: CPT

## 2018-12-12 PROCEDURE — 87798 DETECT AGENT NOS DNA AMP: CPT

## 2018-12-12 PROCEDURE — 81001 URINALYSIS AUTO W/SCOPE: CPT

## 2018-12-12 PROCEDURE — 87581 M.PNEUMON DNA AMP PROBE: CPT

## 2018-12-12 PROCEDURE — 87633 RESP VIRUS 12-25 TARGETS: CPT

## 2018-12-12 PROCEDURE — 85610 PROTHROMBIN TIME: CPT

## 2018-12-12 PROCEDURE — 96374 THER/PROPH/DIAG INJ IV PUSH: CPT

## 2018-12-12 PROCEDURE — 94640 AIRWAY INHALATION TREATMENT: CPT

## 2018-12-12 PROCEDURE — 99239 HOSP IP/OBS DSCHRG MGMT >30: CPT

## 2018-12-12 PROCEDURE — 85027 COMPLETE CBC AUTOMATED: CPT

## 2018-12-12 PROCEDURE — 82565 ASSAY OF CREATININE: CPT

## 2018-12-12 PROCEDURE — 87486 CHLMYD PNEUM DNA AMP PROBE: CPT

## 2018-12-12 PROCEDURE — 99285 EMERGENCY DEPT VISIT HI MDM: CPT | Mod: 25

## 2018-12-12 PROCEDURE — 83735 ASSAY OF MAGNESIUM: CPT

## 2018-12-12 PROCEDURE — 80053 COMPREHEN METABOLIC PANEL: CPT

## 2018-12-12 PROCEDURE — 93005 ELECTROCARDIOGRAM TRACING: CPT

## 2018-12-12 PROCEDURE — 71046 X-RAY EXAM CHEST 2 VIEWS: CPT

## 2018-12-12 PROCEDURE — 80048 BASIC METABOLIC PNL TOTAL CA: CPT

## 2018-12-12 PROCEDURE — 82435 ASSAY OF BLOOD CHLORIDE: CPT

## 2018-12-12 PROCEDURE — 83605 ASSAY OF LACTIC ACID: CPT

## 2018-12-12 PROCEDURE — 85730 THROMBOPLASTIN TIME PARTIAL: CPT

## 2018-12-12 PROCEDURE — 84100 ASSAY OF PHOSPHORUS: CPT

## 2018-12-12 PROCEDURE — 84145 PROCALCITONIN (PCT): CPT

## 2018-12-12 PROCEDURE — 84132 ASSAY OF SERUM POTASSIUM: CPT

## 2018-12-12 PROCEDURE — 87040 BLOOD CULTURE FOR BACTERIA: CPT

## 2018-12-12 RX ORDER — IPRATROPIUM/ALBUTEROL SULFATE 18-103MCG
3 AEROSOL WITH ADAPTER (GRAM) INHALATION EVERY 6 HOURS
Qty: 0 | Refills: 0 | Status: DISCONTINUED | OUTPATIENT
Start: 2018-12-12 | End: 2018-12-12

## 2018-12-12 RX ORDER — ATORVASTATIN CALCIUM 80 MG/1
20 TABLET, FILM COATED ORAL AT BEDTIME
Qty: 0 | Refills: 0 | Status: DISCONTINUED | OUTPATIENT
Start: 2018-12-12 | End: 2018-12-12

## 2018-12-12 RX ORDER — ACETAMINOPHEN 500 MG
2 TABLET ORAL
Qty: 0 | Refills: 0 | DISCHARGE
Start: 2018-12-12

## 2018-12-12 RX ORDER — PANTOPRAZOLE SODIUM 20 MG/1
40 TABLET, DELAYED RELEASE ORAL
Qty: 0 | Refills: 0 | Status: DISCONTINUED | OUTPATIENT
Start: 2018-12-12 | End: 2018-12-12

## 2018-12-12 RX ORDER — ACETAMINOPHEN 500 MG
650 TABLET ORAL EVERY 6 HOURS
Qty: 0 | Refills: 0 | Status: DISCONTINUED | OUTPATIENT
Start: 2018-12-12 | End: 2018-12-12

## 2018-12-12 RX ORDER — RIVAROXABAN 15 MG-20MG
20 KIT ORAL EVERY 24 HOURS
Qty: 0 | Refills: 0 | Status: DISCONTINUED | OUTPATIENT
Start: 2018-12-12 | End: 2018-12-12

## 2018-12-12 RX ORDER — ALBUTEROL 90 UG/1
3 AEROSOL, METERED ORAL
Qty: 3 | Refills: 0
Start: 2018-12-12 | End: 2018-12-21

## 2018-12-12 RX ORDER — SODIUM CHLORIDE 9 MG/ML
1000 INJECTION INTRAMUSCULAR; INTRAVENOUS; SUBCUTANEOUS
Qty: 0 | Refills: 0 | Status: COMPLETED | OUTPATIENT
Start: 2018-12-12 | End: 2018-12-12

## 2018-12-12 RX ADMIN — Medication 200 MILLIGRAM(S): at 05:43

## 2018-12-12 RX ADMIN — SODIUM CHLORIDE 100 MILLILITER(S): 9 INJECTION INTRAMUSCULAR; INTRAVENOUS; SUBCUTANEOUS at 00:49

## 2018-12-12 RX ADMIN — Medication 75 MILLIGRAM(S): at 12:28

## 2018-12-12 RX ADMIN — PANTOPRAZOLE SODIUM 40 MILLIGRAM(S): 20 TABLET, DELAYED RELEASE ORAL at 12:28

## 2018-12-12 RX ADMIN — Medication 200 MILLIGRAM(S): at 12:29

## 2018-12-12 RX ADMIN — Medication 3 MILLILITER(S): at 05:32

## 2018-12-12 RX ADMIN — Medication 3 MILLILITER(S): at 12:28

## 2018-12-12 NOTE — DISCHARGE NOTE ADULT - CARE PROVIDER_API CALL
Jasiel Barry (DO), Internal Medicine  [address]  [address]  [place], [place] [id number]  Phone: [phone]  Fax: [phone]

## 2018-12-12 NOTE — DISCHARGE NOTE ADULT - MEDICATION SUMMARY - MEDICATIONS TO TAKE
I will START or STAY ON the medications listed below when I get home from the hospital:    1 nebulizer machine ICD 10: J45.909  -- Indication: For SOB/WHEEZING    acetaminophen 325 mg oral tablet  -- 2 tab(s) by mouth every 6 hours, As needed, Temp greater or equal to 38C (100.4F)  -- Indication: For Flu    Xarelto 20 mg oral tablet  -- 1 tab(s) by mouth once a day (in the evening)  -- Indication: For History of DVT (deep vein thrombosis)    atorvastatin 20 mg oral tablet  -- 1 tab(s) by mouth once a day (at bedtime)  -- Indication: For Hyperlipidemia    oseltamivir 75 mg oral capsule  -- 1 cap(s) by mouth 2 times a day  -- Indication: For Flu    albuterol 0.63 mg/3 mL (0.021%) inhalation solution  -- 3 milliliter(s) by nebulizer every 6 hours for shortness of breath/wheezing  -- For inhalation only.  It is very important that you take or use this exactly as directed.  Do not skip doses or discontinue unless directed by your doctor.  Obtain medical advice before taking any non-prescription drugs as some may affect the action of this medication.    -- Indication: For SOB/WHEEZING    guaiFENesin 100 mg/5 mL oral liquid  -- 10 milliliter(s) by mouth every 6 hours, As needed, Cough  -- Indication: For Flu    PriLOSEC 40 mg oral delayed release capsule  -- 1 cap(s) by mouth once a day  -- Indication: For Prophylactic measure

## 2018-12-12 NOTE — DISCHARGE NOTE ADULT - PLAN OF CARE
Conservative management Continue Tamiflu as directed for 4 more days. Provided with prescription for nebulizer treatments with nebulizer machine for symptoms of shortness of breath/wheezing.  Nutrition is important, eat small frequent meals.  Get lots of rest and drink fluids.  Call your health care provider upon arrival home from hospital and make a follow up appointment for one week.  If your cough worsens, you develop fever greater than 101', you have shaking chills, a fast heartbeat, trouble breathing and/or feel your are breathing much faster than usual, call your healthcare provider.  Make sure you wash your hands frequently. Resolved Call you Health care provider upon arrival home to make a one week follow up appointment.  If you develop fever, chills, malaise, or change in mental status call your Health Care Provider or go to the Emergency Department.  Nutrition is important, eat small frequent meals to help ensure you get adequate calories.  Do not stay in bed all day!  Increase your activity daily as tolerated. Most former smokers quit without using one of the treatments that scientific research has shown can work. However, the following treatments are proven to be effective for smokers who want help to quit:    •Brief help by a doctor (such as when a doctor takes 10 minutes or less to give a patient advice and assistance about quitting)  •Individual, group, or telephone counseling  •Behavioral therapies (such as training in problem solving)  •Treatments with more person-to-person contact and more intensity (such as more or longer counseling sessions)  •Programs to deliver treatments using mobile phones  Medications for quitting that have been found to be effective include the following:    •Nicotine replacement products       Over-the-counter (nicotine patch [which is also available by prescription], gum, lozenge)       Prescription (nicotine patch, inhaler, nasal spray)  •Prescription non-nicotine medications: bupropion SR (Zyban®), varenicline tartrate (Chantix®) Take your Xarelto as prescribed  Walking is encouraged, increase activity as tolerated.  If you develop new leg pain, swelling, and/or redness contact your healthcare provider.  If you develop new chest pain with difficulty breathing, a rapid heart rate and/or a feeling of passing out call emergency medical services 911.

## 2018-12-12 NOTE — DISCHARGE NOTE ADULT - CARE PLAN
Principal Discharge DX:	Influenza A  Goal:	Conservative management  Assessment and plan of treatment:	Continue Tamiflu as directed for 4 more days. Provided with prescription for nebulizer treatments with nebulizer machine for symptoms of shortness of breath/wheezing.  Nutrition is important, eat small frequent meals.  Get lots of rest and drink fluids.  Call your health care provider upon arrival home from hospital and make a follow up appointment for one week.  If your cough worsens, you develop fever greater than 101', you have shaking chills, a fast heartbeat, trouble breathing and/or feel your are breathing much faster than usual, call your healthcare provider.  Make sure you wash your hands frequently.  Secondary Diagnosis:	Sepsis  Goal:	Resolved  Assessment and plan of treatment:	Call you Health care provider upon arrival home to make a one week follow up appointment.  If you develop fever, chills, malaise, or change in mental status call your Health Care Provider or go to the Emergency Department.  Nutrition is important, eat small frequent meals to help ensure you get adequate calories.  Do not stay in bed all day!  Increase your activity daily as tolerated.  Secondary Diagnosis:	Tobacco dependence due to cigarettes  Assessment and plan of treatment:	Most former smokers quit without using one of the treatments that scientific research has shown can work. However, the following treatments are proven to be effective for smokers who want help to quit:    •Brief help by a doctor (such as when a doctor takes 10 minutes or less to give a patient advice and assistance about quitting)  •Individual, group, or telephone counseling  •Behavioral therapies (such as training in problem solving)  •Treatments with more person-to-person contact and more intensity (such as more or longer counseling sessions)  •Programs to deliver treatments using mobile phones  Medications for quitting that have been found to be effective include the following:    •Nicotine replacement products       Over-the-counter (nicotine patch [which is also available by prescription], gum, lozenge)       Prescription (nicotine patch, inhaler, nasal spray)  •Prescription non-nicotine medications: bupropion SR (Zyban®), varenicline tartrate (Chantix®)  Secondary Diagnosis:	History of DVT (deep vein thrombosis)  Assessment and plan of treatment:	Take your Xarelto as prescribed  Walking is encouraged, increase activity as tolerated.  If you develop new leg pain, swelling, and/or redness contact your healthcare provider.  If you develop new chest pain with difficulty breathing, a rapid heart rate and/or a feeling of passing out call emergency medical services 911.

## 2018-12-12 NOTE — ED ADULT NURSE REASSESSMENT NOTE - NS ED NURSE REASSESS COMMENT FT1
Pt educated on how to obtain clean catch urine sample. Pt provided with necessary equipment
Pt's SpO2 dropped to 89% RA, pt placed on 2l NC and given another duoneb treatment. No work of breathing noted. SpO2 now 98%. Daughter at bedside. Antibiotics running
Pt sating 95% on RA, when sleeping he's at 89-92%. Hospitalist notified, requesting Pt to be ambulated while on pulse O2. Pt brought breakfast, well appearing, shows no SS of respiratory distress.

## 2018-12-12 NOTE — DISCHARGE NOTE ADULT - PATIENT PORTAL LINK FT
You can access the [facility] Patient Portal, offered by [facility], by registering with the following website: [email or web address]

## 2018-12-12 NOTE — DISCHARGE NOTE ADULT - HOSPITAL COURSE
adf 59 yo man with PMH of HLD, GERD, hx of RLE DVT in setting of knee surgery on Xarelto, current smoker presenting with cough x 5 days and subjective fevers and chills of 1 day, found with influenza A meeting sepsis criteria. Upon arrival to ED, pt's physical exam significant for expiratory wheezing w/ diminished breath sounds at bases, hypoxic 86% on RA, RVP confirming influenza A, CXR negative for consolidations. During hospital course, pt started on Tamiflu, symptoms managed with duonebs, initially placed on 4L NC for hypoxia, was weaned off oxygen, maintaining saturations >92% on RA post-ambulation, with considerable relief of symptoms. Repeat labs and vitals shows resolution of sepsis, deemed clinically stable for discharge as per primary team, with plan to complete 4 more days of Tamiflu, ongoing use of duonebs, information regarding smoking cessation, and plan to f/u with PCP within 1 week.

## 2018-12-16 LAB
CULTURE RESULTS: SIGNIFICANT CHANGE UP
CULTURE RESULTS: SIGNIFICANT CHANGE UP
SPECIMEN SOURCE: SIGNIFICANT CHANGE UP
SPECIMEN SOURCE: SIGNIFICANT CHANGE UP

## 2019-03-29 ENCOUNTER — APPOINTMENT (OUTPATIENT)
Dept: CT IMAGING | Facility: CLINIC | Age: 59
End: 2019-03-29
Payer: COMMERCIAL

## 2019-03-29 ENCOUNTER — OUTPATIENT (OUTPATIENT)
Dept: OUTPATIENT SERVICES | Facility: HOSPITAL | Age: 59
LOS: 1 days | End: 2019-03-29
Payer: COMMERCIAL

## 2019-03-29 DIAGNOSIS — Z98.89 OTHER SPECIFIED POSTPROCEDURAL STATES: Chronic | ICD-10-CM

## 2019-03-29 DIAGNOSIS — Z00.8 ENCOUNTER FOR OTHER GENERAL EXAMINATION: ICD-10-CM

## 2019-03-29 DIAGNOSIS — Z96.652 PRESENCE OF LEFT ARTIFICIAL KNEE JOINT: Chronic | ICD-10-CM

## 2019-03-29 PROCEDURE — 71250 CT THORAX DX C-: CPT | Mod: 26

## 2019-03-29 PROCEDURE — 71250 CT THORAX DX C-: CPT

## 2019-04-18 NOTE — H&P PST ADULT - HEALTH CARE MAINTENANCE
Hatchet Flap Text: The defect edges were debeveled with a #15 scalpel blade.  Given the location of the defect, shape of the defect and the proximity to free margins a hatchet flap was deemed most appropriate.  Using a sterile surgical marker, an appropriate hatchet flap was drawn incorporating the defect and placing the expected incisions within the relaxed skin tension lines where possible.    The area thus outlined was incised deep to adipose tissue with a #15 scalpel blade.  The skin margins were undermined to an appropriate distance in all directions utilizing iris scissors. Flu vaccine in 2017  On yearly Annual physical  Last colonoscopy - 2015

## 2019-04-26 ENCOUNTER — APPOINTMENT (OUTPATIENT)
Dept: ORTHOPEDIC SURGERY | Facility: CLINIC | Age: 59
End: 2019-04-26

## 2019-08-30 ENCOUNTER — APPOINTMENT (OUTPATIENT)
Dept: ORTHOPEDIC SURGERY | Facility: CLINIC | Age: 59
End: 2019-08-30

## 2020-01-22 ENCOUNTER — APPOINTMENT (OUTPATIENT)
Dept: ORTHOPEDIC SURGERY | Facility: CLINIC | Age: 60
End: 2020-01-22
Payer: COMMERCIAL

## 2020-01-22 DIAGNOSIS — S83.92XA SPRAIN OF UNSPECIFIED SITE OF LEFT KNEE, INITIAL ENCOUNTER: ICD-10-CM

## 2020-01-22 DIAGNOSIS — M17.12 UNILATERAL PRIMARY OSTEOARTHRITIS, LEFT KNEE: ICD-10-CM

## 2020-01-22 PROCEDURE — 73562 X-RAY EXAM OF KNEE 3: CPT | Mod: LT

## 2020-01-22 PROCEDURE — 99213 OFFICE O/P EST LOW 20 MIN: CPT

## 2020-01-22 PROCEDURE — 73560 X-RAY EXAM OF KNEE 1 OR 2: CPT | Mod: RT

## 2020-01-22 RX ORDER — DICLOFENAC SODIUM 10 MG/G
1 GEL TOPICAL DAILY
Qty: 1 | Refills: 2 | Status: ACTIVE | COMMUNITY
Start: 2020-01-22 | End: 1900-01-01

## 2020-01-22 NOTE — ADDENDUM
[FreeTextEntry1] : This note was written by Cass Aranda on 01/22/2020 acting as scribe for Dr. Sam Aguilar M.D.\par \par I, Dr. Sam Aguilar M.D., have read and attest that all the information, medical decision making and discharge instructions within are true and accurate.

## 2020-01-22 NOTE — DISCUSSION/SUMMARY
[de-identified] : Patient is now s/p left TKR at 4 years with some mild tenderness over the MCL. 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 I reviewed x-ray films with them and I have reassured him that his implants are functioning well and there are no mechanical issues. I suggested he wear a knee sleeve for support and provided a prescription for Voltaren Gel as he cannot take PO anti-inflammatory medications due to being on Xarelto. They will continue activities as tolerated. Patient will follow up with x-rays in 3 months.

## 2020-01-22 NOTE — PHYSICAL EXAM
[de-identified] : Left Knee\par Inspection: no effusion\par Wounds: healed midline incision\par Alignment: normal.\par Palpation: tenderness over MCL on palpation.\par ROM: Active (in degrees) 0-125\par Ligamentous laxity (neg): negative ant. drawer test, negative post. drawer test, stable to varus stress test, stable to valgus stress test,\par Patellofemoral Alignment Test: Q angle-, normal.\par Muscle Test: good quad strength.\par Leg examination: calf is soft and non-tender.  [de-identified] : Left knee xrays, AP, lateral, merchant view taken at the office today demonstrates a total knee replacement in satisfactory position and alignment. No evidence of loosening. The patella sits in a central position\par \par Right knee xray merchant view, taken at the office today demonstrates good joint space and a well centered patella.

## 2020-01-22 NOTE — HISTORY OF PRESENT ILLNESS
[de-identified] : 59 year old male presents for follow up evaluation s/p left TKR at 4 years. For the past 6-8 months he has been having intermittent sharp pains on the inner aspect of his knee. 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 Patient denies any specific injury. He would like to discuss his symptoms and check on his implant at this time.

## 2020-04-22 ENCOUNTER — APPOINTMENT (OUTPATIENT)
Dept: ORTHOPEDIC SURGERY | Facility: CLINIC | Age: 60
End: 2020-04-22

## 2020-09-22 ENCOUNTER — OUTPATIENT (OUTPATIENT)
Dept: OUTPATIENT SERVICES | Facility: HOSPITAL | Age: 60
LOS: 1 days | End: 2020-09-22
Payer: COMMERCIAL

## 2020-09-22 ENCOUNTER — APPOINTMENT (OUTPATIENT)
Dept: RADIOLOGY | Facility: CLINIC | Age: 60
End: 2020-09-22
Payer: COMMERCIAL

## 2020-09-22 DIAGNOSIS — Z98.89 OTHER SPECIFIED POSTPROCEDURAL STATES: Chronic | ICD-10-CM

## 2020-09-22 DIAGNOSIS — Z00.8 ENCOUNTER FOR OTHER GENERAL EXAMINATION: ICD-10-CM

## 2020-09-22 DIAGNOSIS — Z96.652 PRESENCE OF LEFT ARTIFICIAL KNEE JOINT: Chronic | ICD-10-CM

## 2020-09-22 PROCEDURE — 71046 X-RAY EXAM CHEST 2 VIEWS: CPT | Mod: 26

## 2020-09-22 PROCEDURE — 71046 X-RAY EXAM CHEST 2 VIEWS: CPT

## 2021-03-08 ENCOUNTER — APPOINTMENT (OUTPATIENT)
Dept: ORTHOPEDIC SURGERY | Facility: CLINIC | Age: 61
End: 2021-03-08
Payer: COMMERCIAL

## 2021-03-08 VITALS — WEIGHT: 250 LBS | BODY MASS INDEX: 32.08 KG/M2 | HEIGHT: 74 IN | HEART RATE: 70 BPM

## 2021-03-08 DIAGNOSIS — R22.32 LOCALIZED SWELLING, MASS AND LUMP, LEFT UPPER LIMB: ICD-10-CM

## 2021-03-08 PROCEDURE — 99072 ADDL SUPL MATRL&STAF TM PHE: CPT

## 2021-03-08 PROCEDURE — 26115 EXC HAND LES SC < 1.5 CM: CPT | Mod: F1

## 2021-03-08 PROCEDURE — 99214 OFFICE O/P EST MOD 30 MIN: CPT | Mod: 57

## 2021-03-29 ENCOUNTER — NON-APPOINTMENT (OUTPATIENT)
Age: 61
End: 2021-03-29

## 2021-03-29 ENCOUNTER — APPOINTMENT (OUTPATIENT)
Dept: CARDIOLOGY | Facility: CLINIC | Age: 61
End: 2021-03-29
Payer: COMMERCIAL

## 2021-03-29 VITALS
DIASTOLIC BLOOD PRESSURE: 88 MMHG | HEART RATE: 63 BPM | HEIGHT: 74 IN | TEMPERATURE: 97.7 F | SYSTOLIC BLOOD PRESSURE: 145 MMHG | RESPIRATION RATE: 20 BRPM | BODY MASS INDEX: 31.95 KG/M2 | OXYGEN SATURATION: 96 % | WEIGHT: 249 LBS

## 2021-03-29 DIAGNOSIS — R06.00 DYSPNEA, UNSPECIFIED: ICD-10-CM

## 2021-03-29 DIAGNOSIS — Z82.49 FAMILY HISTORY OF ISCHEMIC HEART DISEASE AND OTHER DISEASES OF THE CIRCULATORY SYSTEM: ICD-10-CM

## 2021-03-29 PROCEDURE — 99244 OFF/OP CNSLTJ NEW/EST MOD 40: CPT

## 2021-03-29 PROCEDURE — 99072 ADDL SUPL MATRL&STAF TM PHE: CPT

## 2021-03-29 PROCEDURE — 93000 ELECTROCARDIOGRAM COMPLETE: CPT

## 2021-03-31 ENCOUNTER — APPOINTMENT (OUTPATIENT)
Dept: RADIOLOGY | Facility: CLINIC | Age: 61
End: 2021-03-31
Payer: COMMERCIAL

## 2021-03-31 ENCOUNTER — OUTPATIENT (OUTPATIENT)
Dept: OUTPATIENT SERVICES | Facility: HOSPITAL | Age: 61
LOS: 1 days | End: 2021-03-31
Payer: COMMERCIAL

## 2021-03-31 DIAGNOSIS — Z98.89 OTHER SPECIFIED POSTPROCEDURAL STATES: Chronic | ICD-10-CM

## 2021-03-31 DIAGNOSIS — Z96.652 PRESENCE OF LEFT ARTIFICIAL KNEE JOINT: Chronic | ICD-10-CM

## 2021-03-31 DIAGNOSIS — Z00.8 ENCOUNTER FOR OTHER GENERAL EXAMINATION: ICD-10-CM

## 2021-03-31 PROCEDURE — 73502 X-RAY EXAM HIP UNI 2-3 VIEWS: CPT | Mod: 26,LT

## 2021-03-31 PROCEDURE — 73560 X-RAY EXAM OF KNEE 1 OR 2: CPT | Mod: 26,LT

## 2021-03-31 PROCEDURE — 73560 X-RAY EXAM OF KNEE 1 OR 2: CPT

## 2021-03-31 PROCEDURE — 73502 X-RAY EXAM HIP UNI 2-3 VIEWS: CPT

## 2021-04-20 ENCOUNTER — APPOINTMENT (OUTPATIENT)
Dept: CARDIOLOGY | Facility: CLINIC | Age: 61
End: 2021-04-20
Payer: COMMERCIAL

## 2021-04-20 PROCEDURE — 78452 HT MUSCLE IMAGE SPECT MULT: CPT

## 2021-04-20 PROCEDURE — A9500: CPT

## 2021-04-20 PROCEDURE — 93306 TTE W/DOPPLER COMPLETE: CPT

## 2021-04-20 PROCEDURE — 93015 CV STRESS TEST SUPVJ I&R: CPT

## 2021-04-20 PROCEDURE — 99072 ADDL SUPL MATRL&STAF TM PHE: CPT

## 2021-04-22 ENCOUNTER — APPOINTMENT (OUTPATIENT)
Dept: ORTHOPEDIC SURGERY | Facility: CLINIC | Age: 61
End: 2021-04-22
Payer: COMMERCIAL

## 2021-04-22 DIAGNOSIS — M70.62 TROCHANTERIC BURSITIS, LEFT HIP: ICD-10-CM

## 2021-04-22 DIAGNOSIS — Z96.652 PRESENCE OF LEFT ARTIFICIAL KNEE JOINT: ICD-10-CM

## 2021-04-22 DIAGNOSIS — M22.2X2 PATELLOFEMORAL DISORDERS, LEFT KNEE: ICD-10-CM

## 2021-04-22 PROCEDURE — 99214 OFFICE O/P EST MOD 30 MIN: CPT | Mod: 25

## 2021-04-22 PROCEDURE — 20610 DRAIN/INJ JOINT/BURSA W/O US: CPT | Mod: LT

## 2021-04-22 PROCEDURE — 99072 ADDL SUPL MATRL&STAF TM PHE: CPT

## 2021-04-22 NOTE — HISTORY OF PRESENT ILLNESS
[de-identified] : JAYLYN MAYER is a 61 year male presenting to the office complaining of left knee and left hip pain. He  presents to the office ambulating independently. Patient reports pain for intermittently for years but worsening pain over the past few months. Denies injury or trauma to the area. Patient is s/p left TKR with Dr. Aguilar on 02/09/2016. The patient describes the pain as a dull aching, and occasionally sharp pain localized to the anterior aspect of his left knee that is intermittent in nature. His symptoms are exacerbated with stairs, and prolonged periods of standing or walking.  Pain is alleviated with rest.  Patient denies instability, catching or locking of the knee. \par  The patient describes the hip pain as a dull aching, and occasionally sharp pain localized to the lateral aspect of his left hip that is intermittent in nature. His symptoms are exacerbated with laying on his left side, and prolonged periods of standing or walking.  Pain is alleviated with rest. Patient denies numbness or tingling in the lower extremities. \par Patient is taking NSAIDs and Tylenol for pain relief with moderate relief in symptoms. Patient denies any other complaints at this time.

## 2021-04-22 NOTE — PHYSICAL EXAM
[de-identified] : Right Lower Extremity\par o Hip :\par ¦ Inspection/Palpation : no tenderness, no swelling, no deformities\par ¦ Range of Motion : full and painless in all planes, no crepitus\par ¦ Stability : joint stability intact\par ¦ Tests and Signs : all tests for stability normal\par \par o Knee :\par ¦ Inspection/Palpation : no tenderness to palpation, no swelling, no deformity\par ¦ Range of Motion : 0 - 120 degrees, no crepitus\par ¦ Stability : no valgus or varus instability present on provocative testing, Lachman’s Test (-)\par ¦ Strength : hip flexion 5-/5, adduction 5-/5, gluteus medius 5-/5 \par o Muscle Bulk : normal muscle bulk present\par o Skin : no erythema, no ecchymosis\par o Sensation : sensation to pin intact\par o Vascular Exam : no edema, no cyanosis, dorsalis pedis artery pulse 2+, posterior tibial artery pulse 2+\par \par Left Lower Extremity\par o Hip :\par ¦ Inspection/Palpation : greater trochanter tenderness to palpation, mild diffuse IT band tenderness to palpation,  no swelling, no deformity\par ¦ Range of Motion : full and painless in all planes, no crepitus\par ¦ Stability : joint stability intact\par ¦ Tests and Signs : all tests for stability normal\par o Muscle Tone : tone normal\par o Muscle Bulk : normal muscle bulk present\par o Skin : no erythema, no ecchymosis\par o Sensation : sensation to light touch intact\par o Vascular Exam : no edema, no cyanosis, dorsalis pedis artery pulse 2+, posterior tibial artery pulse 2+\par o Special Tests: ROSLYN Test (-) FADIR Test (+ for lateral hip pain) \par \par o Knee :\par ¦ Inspection/Palpation : no tenderness to palpation, no swelling, no deformity, incision well healed. \par ¦ Range of Motion : 0 -110 degrees, no crepitus\par ¦ Stability : no valgus or varus instability present on provocative testing, Lachman’s Test (-)\par ¦ Strength : hip flexion 3+/5, adduction 3+/5, gluteus medius 3+/5 \par o Muscle Bulk : normal muscle bulk present\par o Skin : no erythema, no ecchymosis\par o Sensation : sensation to pin intact\par o Vascular Exam : no edema, no cyanosis, dorsalis pedis artery pulse 2+, posterior tibial artery pulse 2+  [de-identified] : Patient comes to today's visit with outside imaging already performed. I reviewed the images in detail with the patient and discussed the findings as highlighted below. \par \par o Xray of the left hip performed on 03/31/2021 at [facility]: Impression: \par ¦ No fracture or dislocation.\par ¦ Preserved left hip joint space and no gross radiographic evidence for AVN.\par ¦ No cortical destruction, periosteal reaction, or lytic or blastic lesions.\par ¦ Unremarkable remaining imaged pelvic osseous structures.\par \par o Xray of the left knee performed on 03/31/2021 at [facility]: Impression: \par ¦ Redemonstrated unconstrained left total knee prosthesis with intact and aligned hardware components and no periprosthetic fractures or suspicious progressive periprosthetic lucencies.\par ¦ No knee joint effusion.\par ¦ No tracking gas collections or gross radiographic evidence for osteomyelitis.\par ¦ Slightly osteopenic appearing osseous structures otherwise no discrete suspicious lytic or blastic lesions.\par

## 2021-04-22 NOTE — DISCUSSION/SUMMARY
[de-identified] : The underlying pathophysiology was reviewed in great detail with the patient as well as the various treatment options, including ice, analgesics, NSAIDs, Physical therapy, steroid injections.\par \par Patient received a corticosteroid injection of the left greater trochanteric bursa today. \par \par Discussed patients pain is cased by patellofemoral pain syndrome (PFPS). Discussed total knee arthroplasty looks good on xray and feels stable. A home exercise sheet was given and discussed with the patient to follow.A Thera-Band was provided for exercise program. \par \par Activity modifications and restrictions were discussed. I advised avoiding deep bending, squatting and high intensity activity.\par \par I have recommended utilizing a knee sleeve to provide added support and stability. \par \par FU 6 weeks. \par \par All questions were answered, all alternatives discussed and the patient is in complete agreement with that plan. Follow-up appointment as instructed. Any issues and the patient will call or come in sooner.

## 2021-04-22 NOTE — CONSULT LETTER
[Dear  ___] : Dear  [unfilled], [Consult Letter:] : I had the pleasure of evaluating your patient, [unfilled]. [Please see my note below.] : Please see my note below. [Consult Closing:] : Thank you very much for allowing me to participate in the care of this patient.  If you have any questions, please do not hesitate to contact me. [Sincerely,] : Sincerely, [FreeTextEntry3] : Dr. Yusuf Lopes \par \par

## 2021-04-22 NOTE — PROCEDURE
[de-identified] : At this point I recommended a therapeutic injection and under sterile precautions an injection of 5 cc 1% lidocaine with 0.5 cc of Kenalog and 0.5 cc of Dexamethasone- was placed into the left greater trochanteric bursa without complication, and after several minutes, the patient felt significant relief.\par \par

## 2021-10-29 NOTE — H&P ADULT - GEN GEN HX ROS MEA POS PC
chills/fatigue/fever Helical Rim Advancement Flap Text: The defect edges were debeveled with a #15 blade scalpel.  Given the location of the defect and the proximity to free margins (helical rim) a double helical rim advancement flap was deemed most appropriate.  Using a sterile surgical marker, the appropriate advancement flaps were drawn incorporating the defect and placing the expected incisions between the helical rim and antihelix where possible.  The area thus outlined was incised through and through with a #15 scalpel blade.  With a skin hook and iris scissors, the flaps were gently and sharply undermined and freed up.

## 2022-08-09 ENCOUNTER — INPATIENT (INPATIENT)
Facility: HOSPITAL | Age: 62
LOS: 1 days | Discharge: HOME | DRG: 190 | End: 2022-08-11
Attending: INTERNAL MEDICINE | Admitting: INTERNAL MEDICINE
Payer: COMMERCIAL

## 2022-08-09 ENCOUNTER — NON-APPOINTMENT (OUTPATIENT)
Age: 62
End: 2022-08-09

## 2022-08-09 VITALS
RESPIRATION RATE: 19 BRPM | DIASTOLIC BLOOD PRESSURE: 85 MMHG | WEIGHT: 268.96 LBS | SYSTOLIC BLOOD PRESSURE: 151 MMHG | TEMPERATURE: 98 F | HEIGHT: 74 IN | OXYGEN SATURATION: 91 % | HEART RATE: 101 BPM

## 2022-08-09 DIAGNOSIS — Z98.89 OTHER SPECIFIED POSTPROCEDURAL STATES: Chronic | ICD-10-CM

## 2022-08-09 DIAGNOSIS — G45.4 TRANSIENT GLOBAL AMNESIA: ICD-10-CM

## 2022-08-09 DIAGNOSIS — Z96.652 PRESENCE OF LEFT ARTIFICIAL KNEE JOINT: Chronic | ICD-10-CM

## 2022-08-09 LAB
ALBUMIN SERPL ELPH-MCNC: 3.3 G/DL — SIGNIFICANT CHANGE UP (ref 3.3–5)
ALP SERPL-CCNC: 95 U/L — SIGNIFICANT CHANGE UP (ref 40–120)
ALT FLD-CCNC: 26 U/L — SIGNIFICANT CHANGE UP (ref 10–45)
ANION GAP SERPL CALC-SCNC: 6 MMOL/L — SIGNIFICANT CHANGE UP (ref 5–17)
AST SERPL-CCNC: 16 U/L — SIGNIFICANT CHANGE UP (ref 10–40)
B PERT DNA SPEC QL NAA+PROBE: SIGNIFICANT CHANGE UP
BASE EXCESS BLDA CALC-SCNC: 9.6 MMOL/L — HIGH (ref -2–3)
BASOPHILS # BLD AUTO: 0.05 K/UL — SIGNIFICANT CHANGE UP (ref 0–0.2)
BASOPHILS NFR BLD AUTO: 0.4 % — SIGNIFICANT CHANGE UP (ref 0–2)
BILIRUB SERPL-MCNC: 0.2 MG/DL — SIGNIFICANT CHANGE UP (ref 0.2–1.2)
BLOOD GAS COMMENTS ARTERIAL: SIGNIFICANT CHANGE UP
BUN SERPL-MCNC: 12 MG/DL — SIGNIFICANT CHANGE UP (ref 7–23)
C PNEUM DNA SPEC QL NAA+PROBE: SIGNIFICANT CHANGE UP
CALCIUM SERPL-MCNC: 8.4 MG/DL — SIGNIFICANT CHANGE UP (ref 8.4–10.5)
CHLORIDE SERPL-SCNC: 105 MMOL/L — SIGNIFICANT CHANGE UP (ref 96–108)
CO2 BLDA-SCNC: 36 MMOL/L — HIGH (ref 19–24)
CO2 SERPL-SCNC: 33 MMOL/L — HIGH (ref 22–31)
CREAT SERPL-MCNC: 0.86 MG/DL — SIGNIFICANT CHANGE UP (ref 0.5–1.3)
EGFR: 97 ML/MIN/1.73M2 — SIGNIFICANT CHANGE UP
EOSINOPHIL # BLD AUTO: 0.16 K/UL — SIGNIFICANT CHANGE UP (ref 0–0.5)
EOSINOPHIL NFR BLD AUTO: 1.3 % — SIGNIFICANT CHANGE UP (ref 0–6)
FLUAV H1 2009 PAND RNA SPEC QL NAA+PROBE: SIGNIFICANT CHANGE UP
FLUAV H1 RNA SPEC QL NAA+PROBE: SIGNIFICANT CHANGE UP
FLUAV H3 RNA SPEC QL NAA+PROBE: SIGNIFICANT CHANGE UP
FLUAV SUBTYP SPEC NAA+PROBE: SIGNIFICANT CHANGE UP
FLUBV RNA SPEC QL NAA+PROBE: SIGNIFICANT CHANGE UP
GAS PNL BLDA: SIGNIFICANT CHANGE UP
GLUCOSE SERPL-MCNC: 118 MG/DL — HIGH (ref 70–99)
HADV DNA SPEC QL NAA+PROBE: SIGNIFICANT CHANGE UP
HCO3 BLDA-SCNC: 34 MMOL/L — HIGH (ref 21–28)
HCOV PNL SPEC NAA+PROBE: SIGNIFICANT CHANGE UP
HCT VFR BLD CALC: 45.7 % — SIGNIFICANT CHANGE UP (ref 39–50)
HGB BLD-MCNC: 14.9 G/DL — SIGNIFICANT CHANGE UP (ref 13–17)
HMPV RNA SPEC QL NAA+PROBE: SIGNIFICANT CHANGE UP
HOROWITZ INDEX BLDA+IHG-RTO: 28 — SIGNIFICANT CHANGE UP
HPIV1 RNA SPEC QL NAA+PROBE: SIGNIFICANT CHANGE UP
HPIV2 RNA SPEC QL NAA+PROBE: SIGNIFICANT CHANGE UP
HPIV3 RNA SPEC QL NAA+PROBE: SIGNIFICANT CHANGE UP
HPIV4 RNA SPEC QL NAA+PROBE: SIGNIFICANT CHANGE UP
IMM GRANULOCYTES NFR BLD AUTO: 0.4 % — SIGNIFICANT CHANGE UP (ref 0–1.5)
INR BLD: 1.12 RATIO — SIGNIFICANT CHANGE UP (ref 0.88–1.16)
LYMPHOCYTES # BLD AUTO: 2.68 K/UL — SIGNIFICANT CHANGE UP (ref 1–3.3)
LYMPHOCYTES # BLD AUTO: 21.7 % — SIGNIFICANT CHANGE UP (ref 13–44)
MCHC RBC-ENTMCNC: 31.4 PG — SIGNIFICANT CHANGE UP (ref 27–34)
MCHC RBC-ENTMCNC: 32.6 GM/DL — SIGNIFICANT CHANGE UP (ref 32–36)
MCV RBC AUTO: 96.2 FL — SIGNIFICANT CHANGE UP (ref 80–100)
MONOCYTES # BLD AUTO: 1.22 K/UL — HIGH (ref 0–0.9)
MONOCYTES NFR BLD AUTO: 9.9 % — SIGNIFICANT CHANGE UP (ref 2–14)
NEUTROPHILS # BLD AUTO: 8.2 K/UL — HIGH (ref 1.8–7.4)
NEUTROPHILS NFR BLD AUTO: 66.3 % — SIGNIFICANT CHANGE UP (ref 43–77)
NRBC # BLD: 0 /100 WBCS — SIGNIFICANT CHANGE UP (ref 0–0)
PCO2 BLDA: 54 MMHG — HIGH (ref 35–48)
PH BLDA: 7.41 — SIGNIFICANT CHANGE UP (ref 7.35–7.45)
PLATELET # BLD AUTO: 330 K/UL — SIGNIFICANT CHANGE UP (ref 150–400)
PO2 BLDA: 73 MMHG — LOW (ref 83–108)
POTASSIUM SERPL-MCNC: 4.5 MMOL/L — SIGNIFICANT CHANGE UP (ref 3.5–5.3)
POTASSIUM SERPL-SCNC: 4.5 MMOL/L — SIGNIFICANT CHANGE UP (ref 3.5–5.3)
PROT SERPL-MCNC: 7.2 G/DL — SIGNIFICANT CHANGE UP (ref 6–8.3)
PROTHROM AB SERPL-ACNC: 13 SEC — SIGNIFICANT CHANGE UP (ref 10.5–13.4)
RAPID RVP RESULT: SIGNIFICANT CHANGE UP
RBC # BLD: 4.75 M/UL — SIGNIFICANT CHANGE UP (ref 4.2–5.8)
RBC # FLD: 13.7 % — SIGNIFICANT CHANGE UP (ref 10.3–14.5)
RSV RNA SPEC QL NAA+PROBE: SIGNIFICANT CHANGE UP
RV+EV RNA SPEC QL NAA+PROBE: SIGNIFICANT CHANGE UP
SAO2 % BLDA: 95.4 % — SIGNIFICANT CHANGE UP (ref 94–98)
SARS-COV-2 RNA SPEC QL NAA+PROBE: SIGNIFICANT CHANGE UP
SARS-COV-2 RNA SPEC QL NAA+PROBE: SIGNIFICANT CHANGE UP
SODIUM SERPL-SCNC: 144 MMOL/L — SIGNIFICANT CHANGE UP (ref 135–145)
TROPONIN I, HIGH SENSITIVITY RESULT: 7.1 NG/L — SIGNIFICANT CHANGE UP
WBC # BLD: 12.36 K/UL — HIGH (ref 3.8–10.5)
WBC # FLD AUTO: 12.36 K/UL — HIGH (ref 3.8–10.5)

## 2022-08-09 PROCEDURE — 99223 1ST HOSP IP/OBS HIGH 75: CPT

## 2022-08-09 PROCEDURE — 93010 ELECTROCARDIOGRAM REPORT: CPT

## 2022-08-09 PROCEDURE — 71045 X-RAY EXAM CHEST 1 VIEW: CPT | Mod: 26

## 2022-08-09 PROCEDURE — 71275 CT ANGIOGRAPHY CHEST: CPT | Mod: 26

## 2022-08-09 PROCEDURE — 99285 EMERGENCY DEPT VISIT HI MDM: CPT

## 2022-08-09 RX ORDER — NICOTINE POLACRILEX 2 MG
1 GUM BUCCAL DAILY
Refills: 0 | Status: DISCONTINUED | OUTPATIENT
Start: 2022-08-09 | End: 2022-08-11

## 2022-08-09 RX ORDER — DEXAMETHASONE 0.5 MG/5ML
6 ELIXIR ORAL ONCE
Refills: 0 | Status: COMPLETED | OUTPATIENT
Start: 2022-08-09 | End: 2022-08-09

## 2022-08-09 RX ORDER — ALBUTEROL 90 UG/1
2 AEROSOL, METERED ORAL
Refills: 0 | Status: COMPLETED | OUTPATIENT
Start: 2022-08-09 | End: 2023-07-08

## 2022-08-09 RX ORDER — RIVAROXABAN 15 MG-20MG
20 KIT ORAL DAILY
Refills: 0 | Status: DISCONTINUED | OUTPATIENT
Start: 2022-08-10 | End: 2022-08-11

## 2022-08-09 RX ORDER — ALBUTEROL 90 UG/1
2 AEROSOL, METERED ORAL EVERY 6 HOURS
Refills: 0 | Status: DISCONTINUED | OUTPATIENT
Start: 2022-08-09 | End: 2022-08-11

## 2022-08-09 RX ORDER — ACETAMINOPHEN 500 MG
650 TABLET ORAL EVERY 6 HOURS
Refills: 0 | Status: DISCONTINUED | OUTPATIENT
Start: 2022-08-09 | End: 2022-08-11

## 2022-08-09 RX ORDER — BUDESONIDE AND FORMOTEROL FUMARATE DIHYDRATE 160; 4.5 UG/1; UG/1
2 AEROSOL RESPIRATORY (INHALATION) EVERY 12 HOURS
Refills: 0 | Status: DISCONTINUED | OUTPATIENT
Start: 2022-08-09 | End: 2022-08-11

## 2022-08-09 RX ORDER — ALBUTEROL 90 UG/1
2 AEROSOL, METERED ORAL
Refills: 0 | Status: COMPLETED | OUTPATIENT
Start: 2022-08-09 | End: 2022-08-09

## 2022-08-09 RX ORDER — TIOTROPIUM BROMIDE 18 UG/1
1 CAPSULE ORAL; RESPIRATORY (INHALATION) DAILY
Refills: 0 | Status: DISCONTINUED | OUTPATIENT
Start: 2022-08-09 | End: 2022-08-11

## 2022-08-09 RX ORDER — TIOTROPIUM BROMIDE 18 UG/1
1 CAPSULE ORAL; RESPIRATORY (INHALATION) DAILY
Refills: 0 | Status: COMPLETED | OUTPATIENT
Start: 2022-08-09 | End: 2023-07-08

## 2022-08-09 RX ORDER — AZITHROMYCIN 500 MG/1
500 TABLET, FILM COATED ORAL DAILY
Refills: 0 | Status: DISCONTINUED | OUTPATIENT
Start: 2022-08-09 | End: 2022-08-11

## 2022-08-09 RX ORDER — TIOTROPIUM BROMIDE 18 UG/1
1 CAPSULE ORAL; RESPIRATORY (INHALATION) DAILY
Refills: 0 | Status: DISCONTINUED | OUTPATIENT
Start: 2022-08-10 | End: 2022-08-11

## 2022-08-09 RX ORDER — GUAIFENESIN/DEXTROMETHORPHAN 600MG-30MG
10 TABLET, EXTENDED RELEASE 12 HR ORAL EVERY 4 HOURS
Refills: 0 | Status: DISCONTINUED | OUTPATIENT
Start: 2022-08-09 | End: 2022-08-11

## 2022-08-09 RX ORDER — ATORVASTATIN CALCIUM 80 MG/1
20 TABLET, FILM COATED ORAL AT BEDTIME
Refills: 0 | Status: DISCONTINUED | OUTPATIENT
Start: 2022-08-09 | End: 2022-08-11

## 2022-08-09 RX ORDER — PANTOPRAZOLE SODIUM 20 MG/1
40 TABLET, DELAYED RELEASE ORAL
Refills: 0 | Status: DISCONTINUED | OUTPATIENT
Start: 2022-08-09 | End: 2022-08-11

## 2022-08-09 RX ORDER — BENZOCAINE AND MENTHOL 5; 1 G/100ML; G/100ML
1 LIQUID ORAL
Refills: 0 | Status: DISCONTINUED | OUTPATIENT
Start: 2022-08-09 | End: 2022-08-10

## 2022-08-09 RX ADMIN — ALBUTEROL 2 PUFF(S): 90 AEROSOL, METERED ORAL at 18:16

## 2022-08-09 RX ADMIN — ALBUTEROL 2 PUFF(S): 90 AEROSOL, METERED ORAL at 18:05

## 2022-08-09 RX ADMIN — Medication 6 MILLIGRAM(S): at 18:04

## 2022-08-09 RX ADMIN — AZITHROMYCIN 500 MILLIGRAM(S): 500 TABLET, FILM COATED ORAL at 21:23

## 2022-08-09 RX ADMIN — ALBUTEROL 2 PUFF(S): 90 AEROSOL, METERED ORAL at 18:26

## 2022-08-09 RX ADMIN — TIOTROPIUM BROMIDE 1 CAPSULE(S): 18 CAPSULE ORAL; RESPIRATORY (INHALATION) at 18:36

## 2022-08-09 NOTE — H&P ADULT - HISTORY OF PRESENT ILLNESS
62 y m hx of dvt  R LL on xarelto , smoker 1ppd 31 y came to ed cc sob cough 1 month progressively become worse seen in urgent care few times rx abx and steroids became better and worse 61 y/o M with HLD, GERD, hx of DVT on Xarelto, COPD, still smokes 1ppd, until today, presents to the ED, for worsening dyspnea.  Pt states he has been having dyspnea and cough  for about 3 weeks.  He went to urgent care ctr 3 weeks ago, was prescribed, antibx, Albuterol prn and short course of Prednisone, with sl improvement.  But symptoms recurred again this week, states cough occ prod with greenish phlegm and last night, dyspnea worsened, was unable to sleep.  This evening, checked his O2 sat and it was 87% on room air, symptoms were persistent so decided to come to the ED.  He denies fever, chest pain, nausea, vomiting, abd pain. Pt was placed on O2 2 LPM via NC, O2 sat now 95%. Pt received Decadron 6 mg IVP, Albuterol x 2 in the ED.

## 2022-08-09 NOTE — ED ADULT NURSE NOTE - NSICDXPASTSURGICALHX_GEN_ALL_CORE_FT
PAST SURGICAL HISTORY:  H/O total knee replacement, left 2016    S/P knee surgery 2013 left meniscus  2015-right meniscus    S/P laminectomy 1990

## 2022-08-09 NOTE — ED ADULT TRIAGE NOTE - CHIEF COMPLAINT QUOTE
Pt c/o worsening difficulty breathing started 1 month ago, seen at an Urgent Care 2 weeks ago given meds not feeling better, smokes 1 PPD

## 2022-08-09 NOTE — H&P ADULT - NSHPPHYSICALEXAM_GEN_ALL_CORE
Vital Signs (24 Hrs):  T(C): 36.8 (08-09-22 @ 17:09), Max: 36.8 (08-09-22 @ 17:09)  HR: 101 (08-09-22 @ 17:09) (101 - 101)  BP: 151/85 (08-09-22 @ 17:09) (151/85 - 151/85)  RR: 19 (08-09-22 @ 17:12) (19 - 19)  SpO2: 96% (08-09-22 @ 17:12) (91% - 96%)  Daily Height in cm: 187.96 (09 Aug 2022 17:09)

## 2022-08-09 NOTE — PATIENT PROFILE ADULT - FALL HARM RISK - UNIVERSAL INTERVENTIONS
Bed in lowest position, wheels locked, appropriate side rails in place/Call bell, personal items and telephone in reach/Instruct patient to call for assistance before getting out of bed or chair/Non-slip footwear when patient is out of bed/[place] to call system/Physically safe environment - no spills, clutter or unnecessary equipment/Purposeful Proactive Rounding/Room/bathroom lighting operational, light cord in reach

## 2022-08-09 NOTE — H&P ADULT - ASSESSMENT
63 y/o M with HLD, GERD, hx of DVT on Xarelto, COPD, still smokes 1ppd, until today, presents to the ED, for worsening dyspnea.  Pt states he has been having dyspnea and cough  for about 3 weeks.  He went to urgent care ctr 3 weeks ago, was prescribed, antibx, Albuterol prn and short course of Prednisone, with sl improvement.  But symptoms recurred again this week, states cough occ prod with greenish phlegm and last night, dyspnea worsened, was unable to sleep.  This evening, checked his O2 sat and it was 87% on room air, symptoms were persistent so decided to come to the ED.  He denies fever, chest pain, nausea, vomiting, abd pain. Pt was placed on O2 2 LPM via NC, O2 sat now 95%. Pt received Decadron 6 mg IVP, Albuterol x 2 in the ED.    Acute hypoxic resp failure sec to COPD exacerbation  Acute bronchitis  Smoker  hx of DVT on Xarelto    Admit  O2 2 LPM via NC to keep O2 sat >92%  Short course of Prednisone  Short course of Zithromax  bronchodilators-Albuterol MDI prn  Start Symbicort 160/4.5 2 puff twice daily, Spiriva 1 puff daily  Smoking cessation- counseled, nicotine patch ordered  Cont other meds: Xarelto, Statin, PPI  Check O2 sats daily - to assess need for home O2

## 2022-08-09 NOTE — ED PROVIDER NOTE - PHYSICAL EXAMINATION
General:     NAD, well-nourished, well-appearing  Head:     NC/AT, EOMI, oral mucosa moist  Neck:     trachea midline  Lungs:     decreased air entry  bilaterally   CVS:     S1S2, RRR, no m/g/r  Abd:     +BS, s/nt/nd, no organomegaly  Ext:    2+ radial and pedal pulses, no c/c/e  Neuro: AAOx3, no sensory/motor deficits

## 2022-08-09 NOTE — ED ADULT NURSE NOTE - OBJECTIVE STATEMENT
Patient came from home with complaint of difficulty breathing, noted on RA 88%. Placed on O2 2L via nasal cannula. Denies pain at this time. Patient is a everyday smoker. Denies any fever, chest pain, or wheezing. Three weeks ago patient had emphysema and was started on prednisone and doxycyline however completed prednisone about two weeks ago and feels to have gotten worse.

## 2022-08-09 NOTE — ED PROVIDER NOTE - CARE PLAN
Goal:	copd exacerbation   1 Principal Discharge DX:	COPD exacerbation  Goal:	copd exacerbation  Secondary Diagnosis:	Hypoxemia

## 2022-08-09 NOTE — ED PROVIDER NOTE - CLINICAL SUMMARY MEDICAL DECISION MAKING FREE TEXT BOX
62 y m hx of dvt  R LL on xarelto , smoker 1ppd 30 y came to ed cc sob cough 1 month progressively become worse seen in urgent care few times rx abx and steroids became better and worse  cxr labs albuterol decadron reeval 62 y m hx of dvt  R LL on xarelto , smoker 1ppd 30 y came to ed cc sob cough 1 month progressively become worse seen in urgent care few times rx abx and steroids became better and worse  cxr labs albuterol decadron reeval  6.30 pm spo2 87 % at room air

## 2022-08-09 NOTE — ED ADULT NURSE NOTE - NSICDXPASTMEDICALHX_GEN_ALL_CORE_FT
PAST MEDICAL HISTORY:  Basal cell carcinoma removed    Bronchitis winter 2014    DVT (deep venous thrombosis) "right lower extremity,after meniscus repair  08/2014 & Had Hemato consult (Prothrombin gene mutation study was normal+LA),treated with Xarelto" & currently on xarelto.    GERD (gastroesophageal reflux disease)     Hyperlipidemia     Pneumonia

## 2022-08-09 NOTE — H&P ADULT - NEUROLOGICAL
cranial nerves II-XII intact/sensation intact/responds to pain/responds to verbal commands/deep reflexes intact/cranial nerves intact

## 2022-08-09 NOTE — ED PROVIDER NOTE - OBJECTIVE STATEMENT
62 y m hx of dvt  R LL on xarelto , smoker 1ppd 30 y came to ed cc sob cough 1 month progressively become worse seen in urgent care few times rx abx and steroids became better and worse

## 2022-08-09 NOTE — PATIENT PROFILE ADULT - NSPRESCRALCFREQ_GEN_A_NUR
Department of Anesthesiology  Preprocedure Note       Name:  Alex Lopez   Age:  67 y.o.  :  1948                                          MRN:  [id number]         Date:  2020      Surgeon: Joyce Crowley):  Crescencio Howe MD    Procedure: Procedure(s):  Phacoemulsification with intraocular lens implant    Medications prior to admission:   Prior to Admission medications    Medication Sig Start Date End Date Taking?  Authorizing Provider   omeprazole (PRILOSEC) 20 MG delayed release capsule Take 20 mg by mouth daily Indications: 1-2 tablets takes in the afternoon    Yes Historical Provider, MD   insulin glargine (LANTUS) 100 UNIT/ML injection vial Inject 18 Units into the skin nightly Indications: 18 units at night, 16 in the morning   Yes Historical Provider, MD   levothyroxine (SYNTHROID) 75 MCG tablet Take 75 mcg by mouth Daily   Yes Historical Provider, MD   valsartan (DIOVAN) 320 MG tablet Take 320 mg by mouth daily   Yes Historical Provider, MD   aspirin 81 MG tablet Take 81 mg by mouth daily   Yes Historical Provider, MD   metFORMIN (GLUCOPHAGE) 1000 MG tablet Take 1,000 mg by mouth 2 times daily (with meals)   Yes Historical Provider, MD   methotrexate (RHEUMATREX) 2.5 MG chemo tablet Take by mouth once a week Indications: 4 tablets   Yes Historical Provider, MD   hydroCHLOROthiazide (HYDRODIURIL) 25 MG tablet Take 25 mg by mouth daily   Yes Historical Provider, MD   amLODIPine (NORVASC) 5 MG tablet Take 5 mg by mouth daily   Yes Historical Provider, MD   folic acid (FOLVITE) 760 MCG tablet Take 800 mcg by mouth daily   Yes Historical Provider, MD   glipiZIDE (GLUCOTROL) 10 MG tablet Take 10 mg by mouth 2 times daily (before meals)   Yes Historical Provider, MD   ferrous sulfate 325 (65 Fe) MG tablet Take 325 mg by mouth daily (with breakfast)   Yes Historical Provider, MD   Cinnamon 500 MG CAPS Take by mouth   Yes Historical Provider, MD   atorvastatin (LIPITOR) 80 MG tablet Take 80 mg by mouth daily   Yes Historical Provider, MD   Ascorbic Acid (VITAMIN C) 250 MG tablet Take 250 mg by mouth daily   Yes Historical Provider, MD   calcium carbonate (OSCAL) 500 MG TABS tablet Take 500 mg by mouth daily   Yes Historical Provider, MD   Omega-3 Fatty Acids (FISH OIL) 1000 MG CAPS Take 1,000 mg by mouth 3 times daily   Yes Historical Provider, MD   Multiple Vitamins-Minerals (THERAPEUTIC MULTIVITAMIN-MINERALS) tablet Take 1 tablet by mouth daily   Yes Historical Provider, MD       Current medications:    Current Facility-Administered Medications   Medication Dose Route Frequency Provider Last Rate Last Dose    0.9 % sodium chloride infusion   Intravenous Continuous Ruth Juarez  mL/hr at 05/22/20 0814      sodium chloride flush 0.9 % injection 10 mL  10 mL Intravenous 2 times per day Hortencia Dubose MD        sodium chloride flush 0.9 % injection 10 mL  10 mL Intravenous PRN Hortencia Dubose MD        cyclopentolate 1%, phenylephrine 2.5%, tropicamide 1%, ketorolac 0.5% ophthalmic solution  3 mL Ophthalmic See Admin Instructions Hortencia Dubose MD   3 mL at 05/22/20 0820    CYCLOPENTOLATE-PHENYLEPHRINE-TROPICAMIDE-KETOROLAC 1%-2.5%-1%-0.5% OP SOLN                Allergies: Allergies   Allergen Reactions    Adhesive Tape     Amoxicillin Hives    Neosporin [Neomycin-Polymyxin-Gramicidin]     Penicillins Rash     Can not take \"Cillin\" drugs        Problem List:  There is no problem list on file for this patient.       Past Medical History:        Diagnosis Date    Acid reflux     Anesthesia complication     do not give her enough and she feels everything      CAD (coronary artery disease)     Diabetes mellitus ([facility] [address]     Hyperlipidemia     Hypertension     Psoriatic arthritis ([facility] [address]     Thyroid disease        Past Surgical History:        Procedure Laterality Date    ANKLE SURGERY Left     pin    CARDIAC CATHETERIZATION      X 2    CHOLECYSTECTOMY      COLONOSCOPY  DILATION AND CURETTAGE OF UTERUS      ENDOSCOPY, COLON, DIAGNOSTIC      INTRACAPSULAR CATARACT EXTRACTION Left 3/6/2020    PHACOEMULSIFICATION WITH INTRAOCULAR LENS IMPLANT performed by Yusuf Braun MD at [address] [address] History:    Social History     Tobacco Use    Smoking status: Never Smoker    Smokeless tobacco: Never Used   Substance Use Topics    Alcohol use: Yes     Comment: rarely                                 Counseling given: Not Answered      Vital Signs (Current):   Vitals:    05/13/20 1136 05/22/20 0808   BP:  (!) 150/68   Pulse:  59   Resp:  15   Temp:  98.2 °F (36.8 °C)   TempSrc:  Temporal   SpO2:  99%   Weight: 167 lb (75.8 kg) 167 lb (75.8 kg)   Height: 5' (1.524 m) 5' (1.524 m)                                              BP Readings from Last 3 Encounters:   05/22/20 (!) 150/68   03/06/20 (!) 146/66   03/06/20 (!) 144/84       NPO Status: Time of last liquid consumption: 1810                        Time of last solid consumption: 1900                        Date of last liquid consumption: 05/22/20                        Date of last solid food consumption: 05/21/20    BMI:   Wt Readings from Last 3 Encounters:   05/22/20 167 lb (75.8 kg)   03/06/20 165 lb (74.8 kg)     Body mass index is 32.61 kg/m². CBC: No results found for: WBC, RBC, HGB, HCT, MCV, RDW, PLT    CMP: No results found for: NA, K, CL, CO2, BUN, CREATININE, GFRAA, AGRATIO, LABGLOM, GLUCOSE, PROT, CALCIUM, BILITOT, ALKPHOS, AST, ALT    POC Tests:   Recent Labs     05/22/20  0817   POCGLU 245*       Coags: No results found for: PROTIME, INR, APTT    HCG (If Applicable): No results found for: PREGTESTUR, PREGSERUM, HCG, HCGQUANT     ABGs: No results found for: PHART, PO2ART, RCU3LVO, WJE6CLK, BEART, T3BYIRSA     Type & Screen (If Applicable):  No results found for: LABABO, LABRH    Drug/Infectious Status (If Applicable):  No results found for: HIV, HEPCAB    COVID-19 Screening (If Applicable):  No Monthly or less

## 2022-08-09 NOTE — ED ADULT NURSE NOTE - NSICDXFAMILYHX_GEN_ALL_CORE_FT
FAMILY HISTORY:  Father  Still living? Unknown  Family history of heart attack, Age at diagnosis: Age Unknown    Mother  Still living? Unknown  Family history of Alzheimer's disease, Age at diagnosis: Age Unknown

## 2022-08-10 LAB
HCV AB S/CO SERPL IA: 0.15 S/CO — SIGNIFICANT CHANGE UP (ref 0–0.99)
HCV AB SERPL-IMP: SIGNIFICANT CHANGE UP

## 2022-08-10 RX ORDER — IBUPROFEN 200 MG
600 TABLET ORAL EVERY 8 HOURS
Refills: 0 | Status: DISCONTINUED | OUTPATIENT
Start: 2022-08-10 | End: 2022-08-11

## 2022-08-10 RX ORDER — PHENOL/SODIUM PHENOLATE
1 AEROSOL, SPRAY (ML) MUCOUS MEMBRANE THREE TIMES A DAY
Refills: 0 | Status: DISCONTINUED | OUTPATIENT
Start: 2022-08-10 | End: 2022-08-11

## 2022-08-10 RX ORDER — LANOLIN ALCOHOL/MO/W.PET/CERES
3 CREAM (GRAM) TOPICAL AT BEDTIME
Refills: 0 | Status: DISCONTINUED | OUTPATIENT
Start: 2022-08-10 | End: 2022-08-11

## 2022-08-10 RX ADMIN — Medication 40 MILLIGRAM(S): at 06:13

## 2022-08-10 RX ADMIN — BENZOCAINE AND MENTHOL 1 LOZENGE: 5; 1 LIQUID ORAL at 14:19

## 2022-08-10 RX ADMIN — Medication 1 PATCH: at 11:14

## 2022-08-10 RX ADMIN — Medication 10 MILLILITER(S): at 00:18

## 2022-08-10 RX ADMIN — Medication 650 MILLIGRAM(S): at 14:18

## 2022-08-10 RX ADMIN — Medication 650 MILLIGRAM(S): at 21:14

## 2022-08-10 RX ADMIN — Medication 650 MILLIGRAM(S): at 14:48

## 2022-08-10 RX ADMIN — Medication 1 SPRAY(S): at 17:30

## 2022-08-10 RX ADMIN — Medication 650 MILLIGRAM(S): at 20:08

## 2022-08-10 RX ADMIN — BUDESONIDE AND FORMOTEROL FUMARATE DIHYDRATE 2 PUFF(S): 160; 4.5 AEROSOL RESPIRATORY (INHALATION) at 21:37

## 2022-08-10 RX ADMIN — PANTOPRAZOLE SODIUM 40 MILLIGRAM(S): 20 TABLET, DELAYED RELEASE ORAL at 06:13

## 2022-08-10 RX ADMIN — Medication 10 MILLILITER(S): at 21:26

## 2022-08-10 RX ADMIN — Medication 3 MILLIGRAM(S): at 00:17

## 2022-08-10 RX ADMIN — ATORVASTATIN CALCIUM 20 MILLIGRAM(S): 80 TABLET, FILM COATED ORAL at 21:27

## 2022-08-10 RX ADMIN — ATORVASTATIN CALCIUM 20 MILLIGRAM(S): 80 TABLET, FILM COATED ORAL at 00:17

## 2022-08-10 RX ADMIN — BUDESONIDE AND FORMOTEROL FUMARATE DIHYDRATE 2 PUFF(S): 160; 4.5 AEROSOL RESPIRATORY (INHALATION) at 08:23

## 2022-08-10 RX ADMIN — RIVAROXABAN 20 MILLIGRAM(S): KIT at 11:16

## 2022-08-10 RX ADMIN — Medication 1 PATCH: at 18:17

## 2022-08-10 RX ADMIN — TIOTROPIUM BROMIDE 1 CAPSULE(S): 18 CAPSULE ORAL; RESPIRATORY (INHALATION) at 08:25

## 2022-08-10 RX ADMIN — AZITHROMYCIN 500 MILLIGRAM(S): 500 TABLET, FILM COATED ORAL at 11:15

## 2022-08-10 RX ADMIN — BENZOCAINE AND MENTHOL 1 LOZENGE: 5; 1 LIQUID ORAL at 00:18

## 2022-08-10 RX ADMIN — BENZOCAINE AND MENTHOL 1 LOZENGE: 5; 1 LIQUID ORAL at 06:15

## 2022-08-10 NOTE — PROGRESS NOTE ADULT - ASSESSMENT
COPD exacerbation/hypoxia - patient is currently respiratory stable. patient's O2 saturation is 92% on RA. will continue antibiotics/inhalers/steroids. patietnadvised to stop smoking and smoking cessation devices were discussed. will get pulmonary MD consult.  RLE DVT - patient is clinically stable. will continue Xarelto  HLD - patient is clinically stable. will continue atorvastatin.  GERD - patient is GI stable. will continue PPI      Case discussed with patient/PA

## 2022-08-10 NOTE — PHARMACOTHERAPY INTERVENTION NOTE - COMMENTS
Met with patient and reviewed current medications. Counseled on new medications Symbicort and Spiriva and reviewed directions for use. Advised pt to rinse mouth after using Symbicort to avoid thrush. Pt verbalized understanding, all questions answered.

## 2022-08-10 NOTE — CHART NOTE - NSCHARTNOTEFT_GEN_A_CORE
Reviewed prior progress notes, labs and imaging.    Discussed with    Primary Diagnosis:  61 y/o M with HLD, GERD, hx of DVT on Xarelto, COPD, still smokes 1ppd, until today, presents to the ED, for worsening dyspnea.  Pt states he has been having dyspnea and cough  for about 3 weeks.  He went to urgent care ctr 3 weeks ago, was prescribed, antibx, Albuterol prn and short course of Prednisone, with sl improvement.  But symptoms recurred again this week, states cough occ prod with greenish phlegm and last night, dyspnea worsened, was unable to sleep.  This evening, checked his O2 sat and it was 87% on room air, symptoms were persistent so decided to come to the ED.  He denies fever, chest pain, nausea, vomiting, abd pain. Pt was placed on O2 2 LPM via NC, O2 sat now 95%. Pt received Decadron 6 mg IVP, Albuterol x 2 in the ED.    Acute hypoxic resp failure sec to COPD exacerbation  Acute bronchitis  Smoker  hx of DVT on Xarelto    Admit  O2 2 LPM via NC to keep O2 sat >92%  Short course of Prednisone  Short course of Zithromax  bronchodilators-Albuterol MDI prn  Start Symbicort 160/4.5 2 puff twice daily, Spiriva 1 puff daily  Smoking cessation- counseled, nicotine patch ordered  Cont other meds: Xarelto, Statin, PPI  Check O2 sats daily - to assess need for home O2

## 2022-08-11 ENCOUNTER — TRANSCRIPTION ENCOUNTER (OUTPATIENT)
Age: 62
End: 2022-08-11

## 2022-08-11 VITALS
DIASTOLIC BLOOD PRESSURE: 85 MMHG | TEMPERATURE: 98 F | RESPIRATION RATE: 17 BRPM | SYSTOLIC BLOOD PRESSURE: 125 MMHG | HEART RATE: 97 BPM | OXYGEN SATURATION: 92 %

## 2022-08-11 LAB
ANION GAP SERPL CALC-SCNC: 5 MMOL/L — SIGNIFICANT CHANGE UP (ref 5–17)
BUN SERPL-MCNC: 17 MG/DL — SIGNIFICANT CHANGE UP (ref 7–23)
CALCIUM SERPL-MCNC: 9 MG/DL — SIGNIFICANT CHANGE UP (ref 8.4–10.5)
CHLORIDE SERPL-SCNC: 103 MMOL/L — SIGNIFICANT CHANGE UP (ref 96–108)
CO2 SERPL-SCNC: 33 MMOL/L — HIGH (ref 22–31)
CREAT SERPL-MCNC: 1 MG/DL — SIGNIFICANT CHANGE UP (ref 0.5–1.3)
EGFR: 85 ML/MIN/1.73M2 — SIGNIFICANT CHANGE UP
GLUCOSE SERPL-MCNC: 88 MG/DL — SIGNIFICANT CHANGE UP (ref 70–99)
HCT VFR BLD CALC: 45.6 % — SIGNIFICANT CHANGE UP (ref 39–50)
HGB BLD-MCNC: 14.7 G/DL — SIGNIFICANT CHANGE UP (ref 13–17)
MCHC RBC-ENTMCNC: 30.5 PG — SIGNIFICANT CHANGE UP (ref 27–34)
MCHC RBC-ENTMCNC: 32.2 GM/DL — SIGNIFICANT CHANGE UP (ref 32–36)
MCV RBC AUTO: 94.6 FL — SIGNIFICANT CHANGE UP (ref 80–100)
NRBC # BLD: 0 /100 WBCS — SIGNIFICANT CHANGE UP (ref 0–0)
PLATELET # BLD AUTO: 334 K/UL — SIGNIFICANT CHANGE UP (ref 150–400)
POTASSIUM SERPL-MCNC: 4.4 MMOL/L — SIGNIFICANT CHANGE UP (ref 3.5–5.3)
POTASSIUM SERPL-SCNC: 4.4 MMOL/L — SIGNIFICANT CHANGE UP (ref 3.5–5.3)
RBC # BLD: 4.82 M/UL — SIGNIFICANT CHANGE UP (ref 4.2–5.8)
RBC # FLD: 14 % — SIGNIFICANT CHANGE UP (ref 10.3–14.5)
SODIUM SERPL-SCNC: 141 MMOL/L — SIGNIFICANT CHANGE UP (ref 135–145)
WBC # BLD: 16.46 K/UL — HIGH (ref 3.8–10.5)
WBC # FLD AUTO: 16.46 K/UL — HIGH (ref 3.8–10.5)

## 2022-08-11 PROCEDURE — 36415 COLL VENOUS BLD VENIPUNCTURE: CPT

## 2022-08-11 PROCEDURE — 74176 CT ABD & PELVIS W/O CONTRAST: CPT | Mod: 26

## 2022-08-11 PROCEDURE — 80048 BASIC METABOLIC PNL TOTAL CA: CPT

## 2022-08-11 PROCEDURE — 85025 COMPLETE CBC W/AUTO DIFF WBC: CPT

## 2022-08-11 PROCEDURE — 87635 SARS-COV-2 COVID-19 AMP PRB: CPT

## 2022-08-11 PROCEDURE — 0225U NFCT DS DNA&RNA 21 SARSCOV2: CPT

## 2022-08-11 PROCEDURE — 93306 TTE W/DOPPLER COMPLETE: CPT

## 2022-08-11 PROCEDURE — 99285 EMERGENCY DEPT VISIT HI MDM: CPT

## 2022-08-11 PROCEDURE — 86803 HEPATITIS C AB TEST: CPT

## 2022-08-11 PROCEDURE — 84484 ASSAY OF TROPONIN QUANT: CPT

## 2022-08-11 PROCEDURE — 82803 BLOOD GASES ANY COMBINATION: CPT

## 2022-08-11 PROCEDURE — 85610 PROTHROMBIN TIME: CPT

## 2022-08-11 PROCEDURE — 96374 THER/PROPH/DIAG INJ IV PUSH: CPT

## 2022-08-11 PROCEDURE — 80053 COMPREHEN METABOLIC PANEL: CPT

## 2022-08-11 PROCEDURE — 93005 ELECTROCARDIOGRAM TRACING: CPT

## 2022-08-11 PROCEDURE — 93306 TTE W/DOPPLER COMPLETE: CPT | Mod: 26

## 2022-08-11 PROCEDURE — 74176 CT ABD & PELVIS W/O CONTRAST: CPT

## 2022-08-11 PROCEDURE — 85027 COMPLETE CBC AUTOMATED: CPT

## 2022-08-11 PROCEDURE — 94640 AIRWAY INHALATION TREATMENT: CPT

## 2022-08-11 PROCEDURE — 71275 CT ANGIOGRAPHY CHEST: CPT | Mod: MA

## 2022-08-11 PROCEDURE — 71045 X-RAY EXAM CHEST 1 VIEW: CPT

## 2022-08-11 RX ORDER — NICOTINE POLACRILEX 2 MG
1 GUM BUCCAL
Refills: 0 | Status: DISCONTINUED | OUTPATIENT
Start: 2022-08-11 | End: 2022-08-11

## 2022-08-11 RX ORDER — AZITHROMYCIN 500 MG/1
1 TABLET, FILM COATED ORAL
Qty: 3 | Refills: 0
Start: 2022-08-11 | End: 2022-08-13

## 2022-08-11 RX ORDER — GUAIFENESIN/DEXTROMETHORPHAN 600MG-30MG
10 TABLET, EXTENDED RELEASE 12 HR ORAL
Qty: 600 | Refills: 0
Start: 2022-08-11 | End: 2022-08-20

## 2022-08-11 RX ORDER — BUDESONIDE AND FORMOTEROL FUMARATE DIHYDRATE 160; 4.5 UG/1; UG/1
1 AEROSOL RESPIRATORY (INHALATION)
Qty: 0 | Refills: 0 | DISCHARGE
Start: 2022-08-11

## 2022-08-11 RX ORDER — BUDESONIDE AND FORMOTEROL FUMARATE DIHYDRATE 160; 4.5 UG/1; UG/1
1 AEROSOL RESPIRATORY (INHALATION)
Qty: 60 | Refills: 0
Start: 2022-08-11 | End: 2022-09-09

## 2022-08-11 RX ORDER — NICOTINE POLACRILEX 2 MG
1 GUM BUCCAL
Qty: 120 | Refills: 0
Start: 2022-08-11 | End: 2022-09-09

## 2022-08-11 RX ORDER — ALBUTEROL 90 UG/1
2 AEROSOL, METERED ORAL
Qty: 0 | Refills: 0 | DISCHARGE
Start: 2022-08-11

## 2022-08-11 RX ORDER — TIOTROPIUM BROMIDE 18 UG/1
1 CAPSULE ORAL; RESPIRATORY (INHALATION)
Qty: 1 | Refills: 0
Start: 2022-08-11 | End: 2022-09-09

## 2022-08-11 RX ORDER — NICOTINE POLACRILEX 2 MG
1 GUM BUCCAL
Qty: 30 | Refills: 0
Start: 2022-08-11 | End: 2022-09-09

## 2022-08-11 RX ADMIN — Medication 650 MILLIGRAM(S): at 03:23

## 2022-08-11 RX ADMIN — BUDESONIDE AND FORMOTEROL FUMARATE DIHYDRATE 2 PUFF(S): 160; 4.5 AEROSOL RESPIRATORY (INHALATION) at 08:23

## 2022-08-11 RX ADMIN — Medication 650 MILLIGRAM(S): at 04:17

## 2022-08-11 RX ADMIN — Medication 1 PATCH: at 07:26

## 2022-08-11 RX ADMIN — Medication 1 PATCH: at 11:37

## 2022-08-11 RX ADMIN — Medication 40 MILLIGRAM(S): at 06:46

## 2022-08-11 RX ADMIN — AZITHROMYCIN 500 MILLIGRAM(S): 500 TABLET, FILM COATED ORAL at 11:37

## 2022-08-11 RX ADMIN — PANTOPRAZOLE SODIUM 40 MILLIGRAM(S): 20 TABLET, DELAYED RELEASE ORAL at 06:46

## 2022-08-11 RX ADMIN — RIVAROXABAN 20 MILLIGRAM(S): KIT at 11:37

## 2022-08-11 RX ADMIN — Medication 1 PATCH: at 12:00

## 2022-08-11 NOTE — CHART NOTE - NSCHARTNOTEFT_GEN_A_CORE
Reviewed prior progress notes, labs and imaging.    Discussed with    Primary Diagnosis:  61 y/o M with HLD, GERD, hx of DVT on Xarelto, COPD, still smokes 1ppd, until today, presents to the ED, for worsening dyspnea.  Pt states he has been having dyspnea and cough  for about 3 weeks.  He went to urgent care ctr 3 weeks ago, was prescribed, antibx, Albuterol prn and short course of Prednisone, with sl improvement.  But symptoms recurred again this week, states cough occ prod with greenish phlegm and last night, dyspnea worsened, was unable to sleep.  This evening, checked his O2 sat and it was 87% on room air, symptoms were persistent so decided to come to the ED.  He denies fever, chest pain, nausea, vomiting, abd pain. Pt was placed on O2 2 LPM via NC, O2 sat now 95%. Pt received Decadron 6 mg IVP, Albuterol x 2 in the ED.    Acute hypoxic resp failure sec to COPD exacerbation  Acute bronchitis  Smoker  hx of DVT on Xarelto    Admit  O2 2 LPM via NC to keep O2 sat >92%  Short course of Prednisone  Short course of Zithromax  bronchodilators-Albuterol MDI prn  Start Symbicort 160/4.5 2 puff twice daily, Spiriva 1 puff daily  Smoking cessation- counseled, nicotine patch ordered  Cont other meds: Xarelto, Statin, PPI  Check O2 sats daily - to assess need for home O2. Reviewed prior progress notes, labs and imaging.    Discussed with    Primary Diagnosis:  63 y/o M with HLD, GERD, hx of DVT on Xarelto, COPD, still smokes 1ppd, until today, presents to the ED, for worsening dyspnea.  Pt states he has been having dyspnea and cough  for about 3 weeks.  He went to urgent care ctr 3 weeks ago, was prescribed, antibx, Albuterol prn and short course of Prednisone, with sl improvement.  But symptoms recurred again this week, states cough occ prod with greenish phlegm and last night, dyspnea worsened, was unable to sleep.  This evening, checked his O2 sat and it was 87% on room air, symptoms were persistent so decided to come to the ED.  He denies fever, chest pain, nausea, vomiting, abd pain. Pt was placed on O2 2 LPM via NC, O2 sat now 95%. Pt received Decadron 6 mg IVP, Albuterol x 2 in the ED.    Acute hypoxic resp failure sec to COPD exacerbation- resolved   Acute bronchitis  Smoker  -off supplemental O2   -wean prednisone currently on 40mg daily wean by 10mg every other day then dc   -Zithromax-  limit to 5 day course Zithromax  continue bronchodilators-Albuterol MDI prn  continue Symbicort 160/4.5 2 puff twice daily, Spiriva 1 puff daily  Smoking cessation- counseled, nicotine patch ordered pt also requesting  nicotine inhalers   Echo pending     Leukocytosis   mostlikely seconday to steroids       hx of DVT on Xarelto  Cont other meds: Xarelto,     HLD   continue statin   Dvt proph -Xalerto  GI proph PPI     Plan dc home today

## 2022-08-11 NOTE — PROGRESS NOTE ADULT - ASSESSMENT
COPD exacerbation/hypoxia - patient is respiratory stable. patient is able to ambulate without JACKSON and oxygen saturation are above 90% with ambulation. will continue steroids and taper. will continue inhalers. patient's O2 saturation on RA is 92%. patient advised to stop smoking and advised on smoking cessation devices. patient advised to follow up with Pulmonary MD.  Left lateral abdominal tenderness - patient is clinically stable. will order CT of abdomen  HLD - patient is clinically stable. will continue atorvastatin      Case discussed with patient/PA

## 2022-08-11 NOTE — CONSULT NOTE ADULT - SUBJECTIVE AND OBJECTIVE BOX
PULMONARY CONSULT  Location of Patient : [facility] ([facility])  Attending requesting Consult:Jasiel Barry  Chief Complaint :     Reason For consult :      Initial HPI on admission:  HPI:  62 year old obese male Active smoker with history of  HLD, GERD, hx of DVT on Xarelto post knee surgery, suspected COPD as still smokes 1ppd who  presents to the ED on 8/9/22 for worsening dyspnea.    Pt states he has been having dyspnea and cough  for about 3 weeks.  He went to urgent care ctr 3 weeks ago, was prescribed, antibx, Albuterol prn and short course of Prednisone, with sl improvement.  But symptoms recurred again this week, states cough occ prod with greenish phlegm and last night, dyspnea worsened, was unable to sleep.  This evening, checked his O2 sat and it was 87% on room air, symptoms were persistent so decided to come to the ED.  He denies fever, chest pain, nausea, vomiting, abd pain. Pt was placed on O2 2 LPM via NC, O2 sat now 95%. Pt received Decadron 6 mg IVP, Albuterol x 2 in the ED.  (09 Aug 2022 19:42)      BRIEF HOSPITAL COURSE: ***    PAST MEDICAL & SURGICAL HISTORY:  GERD (gastroesophageal reflux disease)  Bronchitis winter 2014  Pneumonia  Basal cell carcinoma removed  Hyperlipidemia  DVT (deep venous thrombosis) &quot;right lower extremity,after meniscus repair  08/2014 &amp; Had Hemato consult (Prothrombin gene mutation study was normal+LA),treated with Xarelto&quot; &amp; currently on xarelto.  S/P laminectomy 1990  S/P knee surgery  2013 left meniscus  2015-right meniscus  H/O total knee replacement, left 2016        Allergies    No Known Allergies    Intolerances      FAMILY HISTORY:  Family history of Alzheimer&#x27;s disease (Mother)    Family history of heart attack (Father)  father at age 65      Social history: Social History:  lives at home  smoker - 1PPD x40 years  Occ etoh (09 Aug 2022 19:42)       Smoking:     Drinking:     Drug use:    Review of Systems: as stated above    CONSTITUTIONAL: No fever, No chills, No fatigue  EYES: No eye pain, No visual disturbances, No discharge  ENMT:  No difficulty hearing, No tinnitus, No vertigo; No sinus or throat pain  NECK: No pain, No stiffness  RESPIRATORY: No Cough, No SOB, No Secretions  CARDIOVASCULAR: No chest pain, No palpitations, No dizziness, or No leg swelling  GASTROINTESTINAL: No abdominal or epigastric pain. No nausea, No vomiting, No hematemesis; No diarrhea, No constipation. No melena, No hematochezia.  GENITOURINARY: No dysuria, No frequency, No hematuria, No incontinence  NEUROLOGICAL: No headaches, No memory loss, No loss of strength, No numbness, No tremors  SKIN: No itching, No burning, No rashes, No lesions   MUSCULOSKELETAL: No joint pain or swelling; No muscle, back, No extremity pain  PSYCHIATRIC: No depression, No anxiety, No mood swings, No difficulty sleeping      Medications:  MEDICATIONS  (STANDING):  atorvastatin 20 milliGRAM(s) Oral at bedtime  azithromycin   Tablet 500 milliGRAM(s) Oral daily  budesonide 160 MICROgram(s)/formoterol 4.5 MICROgram(s) Inhaler 2 Puff(s) Inhalation every 12 hours  nicotine - 21 mG/24Hr(s) Patch 1 Patch Transdermal daily  pantoprazole    Tablet 40 milliGRAM(s) Oral before breakfast  predniSONE   Tablet 40 milliGRAM(s) Oral daily  rivaroxaban 20 milliGRAM(s) Oral daily  tiotropium 18 MICROgram(s) Capsule 1 Capsule(s) Inhalation daily  tiotropium 18 MICROgram(s) Capsule 1 Capsule(s) Inhalation daily    MEDICATIONS  (PRN):  acetaminophen     Tablet .. 650 milliGRAM(s) Oral every 6 hours PRN Temp greater or equal to 38C (100.4F), Mild Pain (1 - 3)  ALBUTerol    90 MICROgram(s) HFA Inhaler 2 Puff(s) Inhalation every 6 hours PRN Shortness of Breath and/or Wheezing  guaifenesin/dextromethorphan Oral Liquid 10 milliLiter(s) Oral every 4 hours PRN Cough  melatonin 3 milliGRAM(s) Oral at bedtime PRN Sleep  nicotine - Inhaler 1 Each Inhalation every 3 hours PRN nicotine craving  phenol 1.4% (CHLORASEPTIC) Oral Spray 1 Spray(s) Topical three times a day PRN sore throat      Antibiotics History  azithromycin   Tablet 500 milliGRAM(s) Oral daily, 08-09-22 @ 20:58, Stop order after: 3 Days      Heme Medications   rivaroxaban 20 milliGRAM(s) Oral daily, 08-10-22 @ 00:00      GI Medications  pantoprazole    Tablet 40 milliGRAM(s) Oral before breakfast, 08-09-22 @ 21:00, Routine        Home Medications:  Last Order Reconciliation Date: 08-09-22 @ 21:00 (Admission Reconciliation)  albuterol 90 mcg/inh inhalation aerosol: 2 puff(s) inhaled every 6 hours, As needed, Shortness of Breath and/or Wheezing (08-11-22 @ 11:54)  atorvastatin 20 mg oral tablet: 1 tab(s) orally once a day (at bedtime) (08-09-22 @ 20:59)  azithromycin 500 mg oral tablet: 1 tab(s) orally once a day (08-11-22 @ 13:12)  budesonide-formoterol 160 mcg-4.5 mcg/inh inhalation aerosol: 1 cap(s) inhaled 2 times a day  (08-11-22 @ 13:21)  guaifenesin-dextromethorphan 100 mg-10 mg/5 mL oral liquid: 10 milliliter(s) orally every 4 hours, As needed, Cough (08-11-22 @ 13:12)  nicotine 10 mg inhalation device: 1 application inhaled 4 times a day (08-11-22 @ 13:12)  nicotine 21 mg/24 hr transdermal film, extended release: 1 patch transdermal once a day  remove at night  (08-11-22 @ 13:12)  predniSONE 10 mg oral tablet: 3 tab(s) orally once a day x 2 days  2 tab(s) orally once a day x 2 days  1 tab(s) orally once a day x 2 days (08-11-22 @ 13:12)  PriLOSEC 40 mg oral delayed release capsule: 1 cap(s) orally once a day (08-09-22 @ 20:59)  tiotropium 18 mcg inhalation capsule: 1 cap(s) inhaled once a day (08-11-22 @ 13:12)  Xarelto 20 mg oral tablet: 1 tab(s) orally once a day (in the evening) (08-09-22 @ 20:59)      LABS:                        14.7   16.46 )-----------( 334      ( 11 Aug 2022 06:20 )             45.6     08-11    141  |  103  |  17  ----------------------------<  88  4.4   |  33<H>  |  1.00    Ca    9.0      11 Aug 2022 06:20    TPro  7.2  /  Alb  3.3  /  TBili  0.2  /  DBili  x   /  AST  16  /  ALT  26  /  AlkPhos  95  08-09            PT/INR - ( 09 Aug 2022 17:45 )   PT: 13.0 sec;   INR: 1.12 ratio                     CULTURES: (if applicable)    Rapid RVP Result: NotDetec (08-09-22 @ 17:45)      ABG - ( 09 Aug 2022 17:50 )  pH, Arterial: 7.41  pH, Blood: x     /  pCO2: 54    /  pO2: 73    / HCO3: 34    / Base Excess: 9.6   /  SaO2: 95.4              CAPILLARY BLOOD GLUCOSE          RADIOLOGY  CXR:      CT:  < from: CT Angio Chest PE Protocol w/ IV Cont (08.09.22 @ 19:28) >    ACC: [id number] EXAM:  CT ANGIO CHEST PULM [facility]                          PROCEDURE DATE:  08/09/2022          INTERPRETATION:  CLINICAL INFORMATION: Hypoxemia. Evaluate for pulmonary   embolism.    COMPARISON: 03/29/2019.    CONTRAST/COMPLICATIONS:  IV Contrast: Omnipaque 350  90 mL was administered   10 mL was discarded  Oral Contrast: NONE  Complications: None reported at time of study completion    PROCEDURE:  CT Angiography of the Chest.  Sagittal and coronal reformats were performed as well as 3D (MIP)   reconstructions.    FINDINGS:    LUNGS AND AIRWAYS: Saber-sheath trachea.  Mild paraseptal and possibly   centrilobular upper lobe emphysema. Scattered foci of reticular scarring   bilaterally.  PLEURA: No pleural effusion.  MEDIASTINUM AND PAUL: No lymphadenopathy.  VESSELS: No evidence of pulmonary emboli. Coronary artery calcification.  HEART: Heart size is normal. No pericardial effusion.  CHEST WALL AND LOWER NECK: Within normal limits.  VISUALIZED UPPER ABDOMEN: Within normal limits.  BONES: Degenerative changes.    IMPRESSION:  No evidence of pulmonary embolism.  Mild emphysema.        --- End of Report ---    < end of copied text >    ECHO:  < from: TTE Echo Complete w/o Contrast w/ Doppler (08.11.22 @ 08:23) >    Summary:   1. Left ventricular ejection fraction, by visual estimation, is 55 to   60%.   2. Normal global left ventricular systolic function.   3. Normal left ventricular internal cavity size.   4. Spectral Doppler shows impaired relaxation pattern of left   ventricular myocardial filling (GradeI diastolic dysfunction).   5. There is mild asymmetric left ventricular hypertrophy.   6. Mildly enlarged right atrium.   7. Moderately enlarged left atrium.   8. Trace mitral valve regurgitation.   9. Sclerotic aortic valve with normal opening.      < end of copied text >      VITALS:  T(C): 36.7 (08-11-22 @ 15:19), Max: 36.8 (08-10-22 @ 20:00)  T(F): 98.1 (08-11-22 @ 15:19), Max: 98.2 (08-10-22 @ 20:00)  HR: 97 (08-11-22 @ 15:19) (74 - 97)  BP: 125/85 (08-11-22 @ 15:19) (125/85 - 145/84)  BP(mean): --  ABP: --  ABP(mean): --  RR: 17 (08-11-22 @ 15:19) (16 - 17)  SpO2: 92% (08-11-22 @ 15:19) (92% - 94%)  CVP(mm Hg): --  CVP(cm H2O): --    Ins and Outs     08-11-22 @ 07:01  -  08-11-22 @ 16:59  --------------------------------------------------------  IN: 240 mL / OUT: 0 mL / NET: 240 mL        Height (cm): 188 (08-09-22 @ 17:09)  Weight (kg): 122 (08-09-22 @ 17:09)  BMI (kg/m2): 34.5 (08-09-22 @ 17:09)        I&O's Detail    11 Aug 2022 07:01  -  11 Aug 2022 16:59  --------------------------------------------------------  IN:    Oral Fluid: 240 mL  Total IN: 240 mL    OUT:  Total OUT: 0 mL    Total NET: 240 mL          Physical Examination:  GENERAL:               Alert, Oriented, No acute distress.    HEENT:                    Pupils equal, reactive to light.  Symmetric. No JVD, Moist MM  PULM:                     Bilateral air entry, Clear to auscultation bilaterally, no significant sputum production, No Rales, No Rhonchi, No Wheezing  CVS:                         S1, S2,  No Murmur  ABD:                        Soft, nondistended, nontender, normoactive bowel sounds,   EXT:                         No edema, nontender, No Cyanosis or Clubbing   Vascular:                Warm Extremities, Normal Capillary refill, Normal Distal Pulses  SKIN:                       Warm and well perfused, no rashes noted.   NEURO:                  Alert, oriented, interactive, nonfocal, follows commands  PSYC:                      Calm, + Insight.     PULMONARY CONSULT  Location of Patient : [facility] ([facility])  Attending requesting Consult:Jasiel Barry  Chief Complaint :     Reason For consult :      Initial HPI on admission:  HPI:  62 year old obese male Active smoker with history of  HLD, GERD, hx of DVT on Xarelto post knee surgery, suspected COPD as still smokes 1ppd who  presents to the ED on 8/9/22 for worsening dyspnea.    Pt states he has been having dyspnea and cough  for about 3 weeks.  He went to urgent care ctr 3 weeks ago, was prescribed, antibx, Albuterol prn and short course of Prednisone, with sl improvement.  But symptoms recurred again this week, states cough occ prod with greenish phlegm and last night, dyspnea worsened, was unable to sleep.  This evening, checked his O2 sat and it was 87% on room air, symptoms were persistent so decided to come to the ED.  He denies fever, chest pain, nausea, vomiting, abd pain. Pt was placed on O2 2 LPM via NC, O2 sat now 95%. Pt received Decadron 6 mg IVP, Albuterol x 2 in the ED.  (09 Aug 2022 19:42)      BRIEF HOSPITAL COURSE:   patient admitted for hypoxia requiring n/c o2 and wheezing  pt given inhalers and steroids and felt better  patient states he was not wheezing but had bad cough  but has been having increasing sob in past few weeks.  States to plan to stop smoking     PAST MEDICAL & SURGICAL HISTORY:  GERD (gastroesophageal reflux disease)  Bronchitis winter 2014  Pneumonia  Basal cell carcinoma removed  Hyperlipidemia  DVT (deep venous thrombosis) &quot;right lower extremity,after meniscus repair  08/2014 &amp; Had Hemato consult (Prothrombin gene mutation study was normal+LA),treated with Xarelto&quot; &amp; currently on xarelto.  S/P laminectomy 1990  S/P knee surgery  2013 left meniscus  2015-right meniscus  H/O total knee replacement, left 2016        Allergies    No Known Allergies    Intolerances      FAMILY HISTORY:  Family history of Alzheimer&#x27;s disease (Mother)    Family history of heart attack (Father)  father at age 65      Social history: Social History:  lives at home  smoker - 1PPD x40 years  Occ etoh (09 Aug 2022 19:42)       Review of Systems: as stated above    CONSTITUTIONAL: No fever, No chills, No fatigue  EYES: No eye pain, No visual disturbances, No discharge  ENMT:  No difficulty hearing, No tinnitus, No vertigo; No sinus or throat pain  NECK: No pain, No stiffness  RESPIRATORY: +Cough, +SOB, +Secretions  CARDIOVASCULAR: No chest pain, No palpitations, No dizziness, or No leg swelling  GASTROINTESTINAL: No abdominal or epigastric pain. No nausea, No vomiting, No hematemesis; No diarrhea, No constipation. No melena, No hematochezia.  GENITOURINARY: No dysuria, No frequency, No hematuria, No incontinence  NEUROLOGICAL: No headaches, No memory loss, No loss of strength, No numbness, No tremors  SKIN: No itching, No burning, No rashes, No lesions   MUSCULOSKELETAL: No joint pain or swelling; No muscle, back, No extremity pain  PSYCHIATRIC: No depression, No anxiety, No mood swings, No difficulty sleeping      Medications:  MEDICATIONS  (STANDING):  atorvastatin 20 milliGRAM(s) Oral at bedtime  azithromycin   Tablet 500 milliGRAM(s) Oral daily  budesonide 160 MICROgram(s)/formoterol 4.5 MICROgram(s) Inhaler 2 Puff(s) Inhalation every 12 hours  nicotine - 21 mG/24Hr(s) Patch 1 Patch Transdermal daily  pantoprazole    Tablet 40 milliGRAM(s) Oral before breakfast  predniSONE   Tablet 40 milliGRAM(s) Oral daily  rivaroxaban 20 milliGRAM(s) Oral daily  tiotropium 18 MICROgram(s) Capsule 1 Capsule(s) Inhalation daily  tiotropium 18 MICROgram(s) Capsule 1 Capsule(s) Inhalation daily    MEDICATIONS  (PRN):  acetaminophen     Tablet .. 650 milliGRAM(s) Oral every 6 hours PRN Temp greater or equal to 38C (100.4F), Mild Pain (1 - 3)  ALBUTerol    90 MICROgram(s) HFA Inhaler 2 Puff(s) Inhalation every 6 hours PRN Shortness of Breath and/or Wheezing  guaifenesin/dextromethorphan Oral Liquid 10 milliLiter(s) Oral every 4 hours PRN Cough  melatonin 3 milliGRAM(s) Oral at bedtime PRN Sleep  nicotine - Inhaler 1 Each Inhalation every 3 hours PRN nicotine craving  phenol 1.4% (CHLORASEPTIC) Oral Spray 1 Spray(s) Topical three times a day PRN sore throat      Antibiotics History  azithromycin   Tablet 500 milliGRAM(s) Oral daily, 08-09-22 @ 20:58, Stop order after: 3 Days      Heme Medications   rivaroxaban 20 milliGRAM(s) Oral daily, 08-10-22 @ 00:00      GI Medications  pantoprazole    Tablet 40 milliGRAM(s) Oral before breakfast, 08-09-22 @ 21:00, Routine        Home Medications:  Last Order Reconciliation Date: 08-09-22 @ 21:00 (Admission Reconciliation)  albuterol 90 mcg/inh inhalation aerosol: 2 puff(s) inhaled every 6 hours, As needed, Shortness of Breath and/or Wheezing (08-11-22 @ 11:54)  atorvastatin 20 mg oral tablet: 1 tab(s) orally once a day (at bedtime) (08-09-22 @ 20:59)  azithromycin 500 mg oral tablet: 1 tab(s) orally once a day (08-11-22 @ 13:12)  budesonide-formoterol 160 mcg-4.5 mcg/inh inhalation aerosol: 1 cap(s) inhaled 2 times a day  (08-11-22 @ 13:21)  guaifenesin-dextromethorphan 100 mg-10 mg/5 mL oral liquid: 10 milliliter(s) orally every 4 hours, As needed, Cough (08-11-22 @ 13:12)  nicotine 10 mg inhalation device: 1 application inhaled 4 times a day (08-11-22 @ 13:12)  nicotine 21 mg/24 hr transdermal film, extended release: 1 patch transdermal once a day  remove at night  (08-11-22 @ 13:12)  predniSONE 10 mg oral tablet: 3 tab(s) orally once a day x 2 days  2 tab(s) orally once a day x 2 days  1 tab(s) orally once a day x 2 days (08-11-22 @ 13:12)  PriLOSEC 40 mg oral delayed release capsule: 1 cap(s) orally once a day (08-09-22 @ 20:59)  tiotropium 18 mcg inhalation capsule: 1 cap(s) inhaled once a day (08-11-22 @ 13:12)  Xarelto 20 mg oral tablet: 1 tab(s) orally once a day (in the evening) (08-09-22 @ 20:59)      LABS:                        14.7   16.46 )-----------( 334      ( 11 Aug 2022 06:20 )             45.6     08-11    141  |  103  |  17  ----------------------------<  88  4.4   |  33<H>  |  1.00    Ca    9.0      11 Aug 2022 06:20    TPro  7.2  /  Alb  3.3  /  TBili  0.2  /  DBili  x   /  AST  16  /  ALT  26  /  AlkPhos  95  08-09            PT/INR - ( 09 Aug 2022 17:45 )   PT: 13.0 sec;   INR: 1.12 ratio                     CULTURES: (if applicable)    Rapid RVP Result: NotDetec (08-09-22 @ 17:45)      ABG - ( 09 Aug 2022 17:50 )  pH, Arterial: 7.41  pH, Blood: x     /  pCO2: 54    /  pO2: 73    / HCO3: 34    / Base Excess: 9.6   /  SaO2: 95.4              CAPILLARY BLOOD GLUCOSE          RADIOLOGY  CXR:      CT:  < from: CT Angio Chest PE Protocol w/ IV Cont (08.09.22 @ 19:28) >    ACC: [id number] EXAM:  CT ANGIO CHEST PULM [facility]                          PROCEDURE DATE:  08/09/2022          INTERPRETATION:  CLINICAL INFORMATION: Hypoxemia. Evaluate for pulmonary   embolism.    COMPARISON: 03/29/2019.    CONTRAST/COMPLICATIONS:  IV Contrast: Omnipaque 350  90 mL was administered   10 mL was discarded  Oral Contrast: NONE  Complications: None reported at time of study completion    PROCEDURE:  CT Angiography of the Chest.  Sagittal and coronal reformats were performed as well as 3D (MIP)   reconstructions.    FINDINGS:    LUNGS AND AIRWAYS: Saber-sheath trachea.  Mild paraseptal and possibly   centrilobular upper lobe emphysema. Scattered foci of reticular scarring   bilaterally.  PLEURA: No pleural effusion.  MEDIASTINUM AND PAUL: No lymphadenopathy.  VESSELS: No evidence of pulmonary emboli. Coronary artery calcification.  HEART: Heart size is normal. No pericardial effusion.  CHEST WALL AND LOWER NECK: Within normal limits.  VISUALIZED UPPER ABDOMEN: Within normal limits.  BONES: Degenerative changes.    IMPRESSION:  No evidence of pulmonary embolism.  Mild emphysema.        --- End of Report ---    < end of copied text >    ECHO:  < from: TTE Echo Complete w/o Contrast w/ Doppler (08.11.22 @ 08:23) >    Summary:   1. Left ventricular ejection fraction, by visual estimation, is 55 to   60%.   2. Normal global left ventricular systolic function.   3. Normal left ventricular internal cavity size.   4. Spectral Doppler shows impaired relaxation pattern of left   ventricular myocardial filling (GradeI diastolic dysfunction).   5. There is mild asymmetric left ventricular hypertrophy.   6. Mildly enlarged right atrium.   7. Moderately enlarged left atrium.   8. Trace mitral valve regurgitation.   9. Sclerotic aortic valve with normal opening.      < end of copied text >      VITALS:  T(C): 36.7 (08-11-22 @ 15:19), Max: 36.8 (08-10-22 @ 20:00)  T(F): 98.1 (08-11-22 @ 15:19), Max: 98.2 (08-10-22 @ 20:00)  HR: 97 (08-11-22 @ 15:19) (74 - 97)  BP: 125/85 (08-11-22 @ 15:19) (125/85 - 145/84)  BP(mean): --  ABP: --  ABP(mean): --  RR: 17 (08-11-22 @ 15:19) (16 - 17)  SpO2: 92% (08-11-22 @ 15:19) (92% - 94%)  CVP(mm Hg): --  CVP(cm H2O): --    Ins and Outs     08-11-22 @ 07:01  -  08-11-22 @ 16:59  --------------------------------------------------------  IN: 240 mL / OUT: 0 mL / NET: 240 mL        Height (cm): 188 (08-09-22 @ 17:09)  Weight (kg): 122 (08-09-22 @ 17:09)  BMI (kg/m2): 34.5 (08-09-22 @ 17:09)        I&O's Detail    11 Aug 2022 07:01  -  11 Aug 2022 16:59  --------------------------------------------------------  IN:    Oral Fluid: 240 mL  Total IN: 240 mL    OUT:  Total OUT: 0 mL    Total NET: 240 mL          Physical Examination:  GENERAL:               Alert, Oriented, No acute distress.    HEENT:                    Pupils equal, reactive to light.  Symmetric. No JVD, Moist MM  PULM:                     Bilateral air entry, Clear to auscultation bilaterally, no significant sputum production, No Rales, No Rhonchi, No Wheezing  CVS:                         S1, S2,  No Murmur  ABD:                        Soft, nondistended, nontender, normoactive bowel sounds,   EXT:                         No edema, nontender, No Cyanosis or Clubbing   Vascular:                Warm Extremities, Normal Capillary refill, Normal Distal Pulses  SKIN:                       Warm and well perfused, no rashes noted.   NEURO:                  Alert, oriented, interactive, nonfocal, follows commands  PSYC:                      Calm, + Insight.

## 2022-08-11 NOTE — DISCHARGE NOTE PROVIDER - NSDCQMCOGNITION_NEU_ALL_CORE
[FreeTextEntry1] : He has findings consistent with recurrent right carpal tunnel syndrome, status post surgical release by another physician in 2006 as well as left carpal tunnel syndrome.  \par \par I had a discussion regarding today's visit, the prognosis of this diagnosis, and treatment recommendations and options. At this time, we discussed the option of a cortisone injection verses surgical release.  I did recommend he consider revision carpal tunnel release.  He decided to first proceed with a cortisone injection at the more symptomatic right carpal tunnel today. He told me he is scheduled for his second Covid vaccine in 2 weeks. I therefore recommended holding off on the injection today. I would like to avoid any potential decreased immunity when he is developing immunity from the vaccine. He will return to the office 2 weeks after his second dose of the vaccine for a cortisone injection. \par \par The patient has agreed to this plan of management and has expressed full understanding.  All questions were fully answered to the patient's satisfaction.\par \par My cumulative time spent on this patient's visit included: Preparation for the visit, review of the medical records, review of pertinent diagnostic studies, examination and counseling of the patient on the above diagnosis, treatment plan and prognosis, orders of diagnostic tests, medication and/or appropriate procedures and documentation in the medical records of today's visit. 
No difficulties

## 2022-08-11 NOTE — DISCHARGE NOTE NURSING/CASE MANAGEMENT/SOCIAL WORK - NSDCPEEMAIL_GEN_ALL_CORE
[facility] for Tobacco Control email [email or web address]

## 2022-08-11 NOTE — DISCHARGE NOTE NURSING/CASE MANAGEMENT/SOCIAL WORK - NSDCPEWEB_GEN_ALL_CORE
[facility] for Tobacco Control website --- [email or web address] website --- [email or web address]

## 2022-08-11 NOTE — DISCHARGE NOTE PROVIDER - NSDCMRMEDTOKEN_GEN_ALL_CORE_FT
albuterol 90 mcg/inh inhalation aerosol: 2 puff(s) inhaled every 6 hours, As needed, Shortness of Breath and/or Wheezing  atorvastatin 20 mg oral tablet: 1 tab(s) orally once a day (at bedtime)  budesonide-formoterol 160 mcg-4.5 mcg/inh inhalation aerosol: 1 application inhaled 2 times a day  predniSONE 20 mg oral tablet: 2 tab(s) orally once a day  PriLOSEC 40 mg oral delayed release capsule: 1 cap(s) orally once a day  Xarelto 20 mg oral tablet: 1 tab(s) orally once a day (in the evening)   albuterol 90 mcg/inh inhalation aerosol: 2 puff(s) inhaled every 6 hours, As needed, Shortness of Breath and/or Wheezing  atorvastatin 20 mg oral tablet: 1 tab(s) orally once a day (at bedtime)  azithromycin 500 mg oral tablet: 1 tab(s) orally once a day  budesonide-formoterol 160 mcg-4.5 mcg/inh inhalation aerosol: 1 cap(s) inhaled 2 times a day   guaifenesin-dextromethorphan 100 mg-10 mg/5 mL oral liquid: 10 milliliter(s) orally every 4 hours, As needed, Cough  nicotine 10 mg inhalation device: 1 application inhaled 4 times a day  nicotine 21 mg/24 hr transdermal film, extended release: 1 patch transdermal once a day  remove at night   predniSONE 10 mg oral tablet: 3 tab(s) orally once a day x 2 days  2 tab(s) orally once a day x 2 days  1 tab(s) orally once a day x 2 days  PriLOSEC 40 mg oral delayed release capsule: 1 cap(s) orally once a day  tiotropium 18 mcg inhalation capsule: 1 cap(s) inhaled once a day  Xarelto 20 mg oral tablet: 1 tab(s) orally once a day (in the evening)   albuterol 90 mcg/inh inhalation aerosol: 2 puff(s) inhaled every 6 hours, As needed, Shortness of Breath and/or Wheezing  atorvastatin 20 mg oral tablet: 1 tab(s) orally once a day (at bedtime)  azithromycin 500 mg oral tablet: 1 tab(s) orally once a day  guaifenesin-dextromethorphan 100 mg-10 mg/5 mL oral liquid: 10 milliliter(s) orally every 4 hours, As needed, Cough  nicotine 10 mg inhalation device: 1 application inhaled 4 times a day  nicotine 21 mg/24 hr transdermal film, extended release: 1 patch transdermal once a day  remove at night   predniSONE 10 mg oral tablet: 3 tab(s) orally once a day x 2 days  2 tab(s) orally once a day x 2 days  1 tab(s) orally once a day x 2 days  PriLOSEC 40 mg oral delayed release capsule: 1 cap(s) orally once a day  tiotropium 18 mcg inhalation capsule: 1 cap(s) inhaled once a day  Xarelto 20 mg oral tablet: 1 tab(s) orally once a day (in the evening)

## 2022-08-11 NOTE — DISCHARGE NOTE PROVIDER - NSDCFUSCHEDAPPT_GEN_ALL_CORE_FT
[address]  Scheduled Appointment: 10/18/2022    Ivan Moser  [address]  Scheduled Appointment: 10/18/2022

## 2022-08-11 NOTE — DISCHARGE NOTE NURSING/CASE MANAGEMENT/SOCIAL WORK - PATIENT PORTAL LINK FT
You can access the FollowMyHealth Patient Portal offered by [facility] by registering at the following website: [email or web address]. By joining [facility]’s FollowMyHealth portal, you will also be able to view your health information using other applications (apps) compatible with our system.

## 2022-08-11 NOTE — DISCHARGE NOTE PROVIDER - CARE PROVIDER_API CALL
Jasiel Barry (DO)  Internal Medicine  [address], [address]  [place], [place] [address]  Phone: [phone]  Fax: [phone]  Follow Up Time:

## 2022-08-11 NOTE — PROGRESS NOTE ADULT - SUBJECTIVE AND OBJECTIVE BOX
JAYLYN MAYER  62y  Male      patient states he is feeling better and denies SOB/JACKSON/cough/fever/chills/CP. patient does complain of left lateral abdomen soreness with movement since coughing 2 weeks ago. he denies n/v/dysuria/hematuria        Cardiac HLD  Pulmonary COPD  GI left lateral abdomen pain   no dysuria/hematuria  Neuro no headache/numbness/dizziness    FAMILY HISTORY:  Family history of Alzheimer&#x27;s disease (Mother)    Family history of heart attack (Father)  father at age 65      T(C): 36.3 (08-11-22 @ 07:01), Max: 36.8 (08-10-22 @ 15:39)  HR: 74 (08-11-22 @ 08:23) (74 - 99)  BP: 138/82 (08-11-22 @ 07:01) (124/79 - 145/84)  RR: 16 (08-11-22 @ 07:01) (16 - 16)  SpO2: 92% (08-11-22 @ 08:23) (92% - 94%)  Wt(kg): --Vital Signs Last 24 Hrs  T(C): 36.3 (11 Aug 2022 07:01), Max: 36.8 (10 Aug 2022 15:39)  T(F): 97.4 (11 Aug 2022 07:01), Max: 98.2 (10 Aug 2022 15:39)  HR: 74 (11 Aug 2022 08:23) (74 - 99)  BP: 138/82 (11 Aug 2022 07:01) (124/79 - 145/84)  BP(mean): --  RR: 16 (11 Aug 2022 07:01) (16 - 16)  SpO2: 92% (11 Aug 2022 08:23) (92% - 94%)    Parameters below as of 11 Aug 2022 08:23  Patient On (Oxygen Delivery Method): room air      No Known Allergies      PHYSICAL EXAM:    General no respiratory distress. non toxic in appearance. resting comfortably  Neck no JVD thyroid stable  Heart regular  Lungs clear  Abdomen  non distended normal bowel sounds no HSM/CVAT mild laeftr lateral abdominal wall tenderness  Extremities non tender no edema +pulses  Neurologic alert and oriented x 3 no acute focal changes            LABS:  CBC Full  -  ( 11 Aug 2022 06:20 )  WBC Count : 16.46 K/uL  RBC Count : 4.82 M/uL  Hemoglobin : 14.7 g/dL  Hematocrit : 45.6 %  Platelet Count - Automated : 334 K/uL  Mean Cell Volume : 94.6 fl  Mean Cell Hemoglobin : 30.5 pg  Mean Cell Hemoglobin Concentration : 32.2 gm/dL  Auto Neutrophil # : x  Auto Lymphocyte # : x  Auto Monocyte # : x  Auto Eosinophil # : x  Auto Basophil # : x  Auto Neutrophil % : x  Auto Lymphocyte % : x  Auto Monocyte % : x  Auto Eosinophil % : x  Auto Basophil % : x                          14.7   16.46 )-----------( 334      ( 11 Aug 2022 06:20 )             45.6     08-11    141  |  103  |  17  ----------------------------<  88  4.4   |  33<H>  |  1.00    Ca    9.0      11 Aug 2022 06:20    TPro  7.2  /  Alb  3.3  /  TBili  0.2  /  DBili  x   /  AST  16  /  ALT  26  /  AlkPhos  95  08-09    LIVER FUNCTIONS - ( 09 Aug 2022 17:45 )  Alb: 3.3 g/dL / Pro: 7.2 g/dL / ALK PHOS: 95 U/L / ALT: 26 U/L / AST: 16 U/L / GGT: x           PT/INR - ( 09 Aug 2022 17:45 )   PT: 13.0 sec;   INR: 1.12 ratio             CAPILLARY BLOOD GLUCOSE        ABG - ( 09 Aug 2022 17:50 )  pH, Arterial: 7.41  pH, Blood: x     /  pCO2: 54    /  pO2: 73    / HCO3: 34    / Base Excess: 9.6   /  SaO2: 95.4              acetaminophen     Tablet .. 650 milliGRAM(s) Oral every 6 hours PRN  ALBUTerol    90 MICROgram(s) HFA Inhaler 2 Puff(s) Inhalation every 6 hours PRN  atorvastatin 20 milliGRAM(s) Oral at bedtime  azithromycin   Tablet 500 milliGRAM(s) Oral daily  budesonide 160 MICROgram(s)/formoterol 4.5 MICROgram(s) Inhaler 2 Puff(s) Inhalation every 12 hours  guaifenesin/dextromethorphan Oral Liquid 10 milliLiter(s) Oral every 4 hours PRN  melatonin 3 milliGRAM(s) Oral at bedtime PRN  nicotine - 21 mG/24Hr(s) Patch 1 Patch Transdermal daily  nicotine - Inhaler 1 Each Inhalation every 3 hours PRN  pantoprazole    Tablet 40 milliGRAM(s) Oral before breakfast  phenol 1.4% (CHLORASEPTIC) Oral Spray 1 Spray(s) Topical three times a day PRN  predniSONE   Tablet 40 milliGRAM(s) Oral daily  rivaroxaban 20 milliGRAM(s) Oral daily  tiotropium 18 MICROgram(s) Capsule 1 Capsule(s) Inhalation daily  tiotropium 18 MICROgram(s) Capsule 1 Capsule(s) Inhalation daily            
  JAYLYN MAYER  62y  Male      patient states currently feeling better and denies SOB/JACKSON/cough/fever/chills/chest pain/palpitations      REVIEW OF SYSTEMS:    General RLE DVT  Cardiac HLD  Pulmonary hypoxia/COPD/SOB  GI GERD   no dysuria/hematuria  Neuro no headache/numbness/dizziness    FAMILY HISTORY:  Family history of Alzheimer&#x27;s disease (Mother)    Family history of heart attack (Father)  father at age 65      T(C): 36.8 (08-10-22 @ 15:39), Max: 36.8 (08-09-22 @ 21:44)  HR: 99 (08-10-22 @ 15:39) (82 - 99)  BP: 124/79 (08-10-22 @ 15:39) (124/79 - 144/86)  RR: 16 (08-10-22 @ 15:39) (16 - 19)  SpO2: 92% (08-10-22 @ 15:39) (92% - 98%)  Wt(kg): --Vital Signs Last 24 Hrs  T(C): 36.8 (10 Aug 2022 15:39), Max: 36.8 (09 Aug 2022 21:44)  T(F): 98.2 (10 Aug 2022 15:39), Max: 98.3 (09 Aug 2022 21:44)  HR: 99 (10 Aug 2022 15:39) (82 - 99)  BP: 124/79 (10 Aug 2022 15:39) (124/79 - 144/86)  BP(mean): --  RR: 16 (10 Aug 2022 15:39) (16 - 19)  SpO2: 92% (10 Aug 2022 15:39) (92% - 98%)    Parameters below as of 10 Aug 2022 15:39  Patient On (Oxygen Delivery Method): room air      No Known Allergies      PHYSICAL EXAM:    General no respiratory distress non toxic in appearance  Neck thyroid stable no JVD  Heart regular  Lungs occasional bilateral exp wheeze  Abdomen non tender non distended normal bowel sounds no HSM/CVAT  Extremities non tender no edema +pulses  Neurologic alert and oriented x 3 no acute focal changes          LABS:  CBC Full  -  ( 09 Aug 2022 17:45 )  WBC Count : 12.36 K/uL  RBC Count : 4.75 M/uL  Hemoglobin : 14.9 g/dL  Hematocrit : 45.7 %  Platelet Count - Automated : 330 K/uL  Mean Cell Volume : 96.2 fl  Mean Cell Hemoglobin : 31.4 pg  Mean Cell Hemoglobin Concentration : 32.6 gm/dL  Auto Neutrophil # : 8.20 K/uL  Auto Lymphocyte # : 2.68 K/uL  Auto Monocyte # : 1.22 K/uL  Auto Eosinophil # : 0.16 K/uL  Auto Basophil # : 0.05 K/uL  Auto Neutrophil % : 66.3 %  Auto Lymphocyte % : 21.7 %  Auto Monocyte % : 9.9 %  Auto Eosinophil % : 1.3 %  Auto Basophil % : 0.4 %                          14.9   12.36 )-----------( 330      ( 09 Aug 2022 17:45 )             45.7     08-09    144  |  105  |  12  ----------------------------<  118<H>  4.5   |  33<H>  |  0.86    Ca    8.4      09 Aug 2022 17:45    TPro  7.2  /  Alb  3.3  /  TBili  0.2  /  DBili  x   /  AST  16  /  ALT  26  /  AlkPhos  95  08-09    LIVER FUNCTIONS - ( 09 Aug 2022 17:45 )  Alb: 3.3 g/dL / Pro: 7.2 g/dL / ALK PHOS: 95 U/L / ALT: 26 U/L / AST: 16 U/L / GGT: x           PT/INR - ( 09 Aug 2022 17:45 )   PT: 13.0 sec;   INR: 1.12 ratio         Troponin I, High Sensitivity (08.09.22 @ 17:45)   Troponin I, High Sensitivity Result: 7.1            ABG - ( 09 Aug 2022 17:50 )  pH, Arterial: 7.41  pH, Blood: x     /  pCO2: 54    /  pO2: 73    / HCO3: 34    / Base Excess: 9.6   /  SaO2: 95.4          < from: Xray Chest 1 View AP/PA (08.09.22 @ 22:20) >    ACC: [id number] EXAM:  XR CHEST AP OR PA 1V                          PROCEDURE DATE:  08/09/2022          INTERPRETATION:  INDICATION: Shortness of breath    COMPARISON: 9/22/2020    FINDINGS:  An AP portable upright view of the chest demonstrates linear density at   both lung bases consistent with scarring or subsegmental atelectasis,   increased from the prior exam. The lungs are otherwise clear, with no   infiltrates on either side and there is no pneumothorax. There are no   pleural effusions. There is no hilar or mediastinal widening. Heart size   is normal, without CHF. The bony thorax is grossly intact.    IMPRESSION:  1. There is new linear scarring versus subsegmental atelectasis in both   lower lobes.  2. No evidence of pneumonia, pleural effusion or CHF.    --- End of Report ---        SATYA LAYNE MD; Attending Radiologist  This document has been electronically signed. Aug 10 2022  2:38PM    < end of copied text >          < from: CT Angio Chest PE Protocol w/ IV Cont (08.09.22 @ 19:28) >  ACC: [id number] EXAM:  CT ANGIO CHEST PULM [facility]                          PROCEDURE DATE:  08/09/2022          INTERPRETATION:  CLINICAL INFORMATION: Hypoxemia. Evaluate for pulmonary   embolism.    COMPARISON: 03/29/2019.    CONTRAST/COMPLICATIONS:  IV Contrast: Omnipaque 350  90 mL was administered   10 mL was discarded  Oral Contrast: NONE  Complications: None reported at time of study completion    PROCEDURE:  CT Angiography of the Chest.  Sagittal and coronal reformats were performed as well as 3D (MIP)   reconstructions.    FINDINGS:    LUNGS AND AIRWAYS: Saber-sheath trachea.  Mild paraseptal and possibly   centrilobular upper lobe emphysema. Scattered foci of reticular scarring   bilaterally.  PLEURA: No pleural effusion.  MEDIASTINUM AND PAUL: No lymphadenopathy.  VESSELS: No evidence of pulmonary emboli. Coronary artery calcification.  HEART: Heart size is normal. No pericardial effusion.  CHEST WALL AND LOWER NECK: Within normal limits.  VISUALIZED UPPER ABDOMEN: Within normal limits.  BONES: Degenerative changes.    IMPRESSION:  No evidence of pulmonary embolism.  Mild emphysema.        --- End of Report ---        MARIELOS GARZA MD; Attending Radiologist  This document has been electronically signed. Aug  9 2022  8:31PM    < end of copied text >          < from: 12 Lead ECG (08.09.22 @ 18:02) >    Ventricular Rate 91 BPM    Atrial Rate 91 BPM    P-R Interval 142 ms    QRS Duration 96 ms    Q-T Interval 344 ms    QTC Calculation(Bazett) 423 ms    P Axis 48 degrees    R Axis -49 degrees    T Axis 46 degrees    Diagnosis Line Normal sinus rhythm  Left axis deviation  Abnormal ECG  No previous ECGs available  Confirmed by Chano Stoll ([id number]) on 8/10/2022 8:02:39 AM    < end of copied text >                acetaminophen     Tablet .. 650 milliGRAM(s) Oral every 6 hours PRN  ALBUTerol    90 MICROgram(s) HFA Inhaler 2 Puff(s) Inhalation every 6 hours PRN  atorvastatin 20 milliGRAM(s) Oral at bedtime  azithromycin   Tablet 500 milliGRAM(s) Oral daily  budesonide 160 MICROgram(s)/formoterol 4.5 MICROgram(s) Inhaler 2 Puff(s) Inhalation every 12 hours  guaifenesin/dextromethorphan Oral Liquid 10 milliLiter(s) Oral every 4 hours PRN  ibuprofen  Tablet. 600 milliGRAM(s) Oral every 8 hours PRN  melatonin 3 milliGRAM(s) Oral at bedtime PRN  nicotine - 21 mG/24Hr(s) Patch 1 Patch Transdermal daily  pantoprazole    Tablet 40 milliGRAM(s) Oral before breakfast  phenol 1.4% (CHLORASEPTIC) Oral Spray 1 Spray(s) Topical three times a day PRN  predniSONE   Tablet 40 milliGRAM(s) Oral daily  rivaroxaban 20 milliGRAM(s) Oral daily  tiotropium 18 MICROgram(s) Capsule 1 Capsule(s) Inhalation daily  tiotropium 18 MICROgram(s) Capsule 1 Capsule(s) Inhalation daily

## 2022-08-11 NOTE — CONSULT NOTE ADULT - ASSESSMENT
Plan  out patient pulm f/u to have PFT  can discharge pt on spiriva and albuterol prn  will hold off symbicort  smoking cesation    Assessment  1. COPD Exacerbation  2. Active nicotine use    Plan  out patient pulm f/u to have PFT  can discharge pt on spiriva and albuterol prn  Finish prednisone taper  will hold off symbicort at this time as imaging shows mild disease  disused issues with urinary retention and monitor side effects at home   smoking cessation   d/w floor team

## 2022-08-11 NOTE — DISCHARGE NOTE NURSING/CASE MANAGEMENT/SOCIAL WORK - NSDCPEFALRISK_GEN_ALL_CORE
For information on Fall & Injury Prevention, visit: [email or web address] OR  [email or web address] OR  https://www.cdc.gov/steadi/patient.html

## 2022-08-11 NOTE — DISCHARGE NOTE PROVIDER - NSDCCPCAREPLAN_GEN_ALL_CORE_FT
PRINCIPAL DISCHARGE DIAGNOSIS  Diagnosis: COPD exacerbation  Assessment and Plan of Treatment: You were admitted difficulty breathing   You were diagnosed with  COPD Exacerbation   You were treated with Course of steriods (prednisone), antibiotics (azithromycin) and inhalers Simbicort and spriva   You were prescribed the following new medications:  You will need to follow up with your primary care physician.      SECONDARY DISCHARGE DIAGNOSES  Diagnosis: Hypoxemia  Assessment and Plan of Treatment:      PRINCIPAL DISCHARGE DIAGNOSIS  Diagnosis: COPD exacerbation  Assessment and Plan of Treatment: You were admitted difficulty breathing   You were diagnosed with  COPD Exacerbation   You were treated with Course of steriods (prednisone), antibiotics (azithromycin) and inhalers albuterol and spriva   You were prescribed the following new medications:  Prednisone taper, azithromycin 500mg X3 days, albuterol inhaler prn, and spiriva daily, Nicoderm patch and nicotrol inhalers   You will need to follow up with your primary care physician.  Follow up with Pulmonary monday         SECONDARY DISCHARGE DIAGNOSES  Diagnosis: Hypoxemia  Assessment and Plan of Treatment:

## 2022-08-11 NOTE — DISCHARGE NOTE PROVIDER - HOSPITAL COURSE
Hospital Course  HPI:  61 y/o M with HLD, GERD, hx of DVT on Xarelto, COPD, still smokes 1ppd, until today, presents to the ED, for worsening dyspnea.  Pt states he has been having dyspnea and cough  for about 3 weeks.  He went to urgent care ctr 3 weeks ago, was prescribed, antibx, Albuterol prn and short course of Prednisone, with sl improvement.  But symptoms recurred again this week, states cough occ prod with greenish phlegm and last night, dyspnea worsened, was unable to sleep.  This evening, checked his O2 sat and it was 87% on room air.  Pt treated with Prednisone 40mg daily to be weaned every other day by 10mg untill off .  Pt also given  azithromycin 500mg po daily for a course of 5 days .  Pt given respiratory treatment with albuterol q6 , spiriva daily and symbicort bid .  Pt seen by DR Padilla pulmonary for consult. and for follow up after hospitalization.  Pt had echo completed.   abd ct preformed for  right flank pain- results below       You were admitted difficulty breathing   You were diagnosed with  COPD Exacerbation   You were treated with Course of steriods (prednisone), antibiotics (azithromycin) and inhalers Simbicort and spriva   You were prescribed the following new medications:    You will need to follow up with your primary care physician.    Source of Infection: COPD   Antibiotic / Last Day:8/14 finish azithromycin 500mg     Palliative Care / Advanced Care Planning  Code Status: full   Patient/Family agreeable to Hospice/Palliative (N)?  Summary of Goals of Care Conversation: NA     Discharging Provider:  Macy RENEE  Contact Info: Cell [phone]-- Please call with any questions or concerns.    Outpatient Provider:  Dr Barry       Hospital Course  HPI:  63 y/o M with HLD, GERD, hx of DVT on Xarelto, COPD, still smokes 1ppd, until today, presents to the ED, for worsening dyspnea.  Pt states he has been having dyspnea and cough  for about 3 weeks.  He went to urgent care ctr 3 weeks ago, was prescribed, antibx, Albuterol prn and short course of Prednisone, with sl improvement.  But symptoms recurred again this week, states cough occ prod with greenish phlegm and last night, dyspnea worsened, was unable to sleep.  This evening, checked his O2 sat and it was 87% on room air.  Pt treated with Prednisone 40mg daily to be weaned every other day by 10mg untill off .  Pt also given  azithromycin 500mg po daily for a course of 5 days .  Pt given respiratory treatment with albuterol q6 , spiriva daily and symbicort bid .  Pt seen by DR Padilla pulmonary for consult. and for follow up after hospitalization.  Pt had echo completed.   abd ct preformed for  right flank pain- results below   CT of the Abdomen and Pelvis was performed.  Sagittal and coronal reformats were performed.    FINDINGS:  LOWER CHEST: Bibasilar reticular scarring/atelectasis. Coronary artery   calcification.    LIVER: 6 mm hypodense focus left hepatic lobe, too small to characterize.  BILE DUCTS: Normal caliber.  GALLBLADDER: Within normal limits.  SPLEEN: Within normal limits.  PANCREAS: Within normal limits.  ADRENALS: Within normal limits.  KIDNEYS/URETERS: No evidence of renal calculi or hydronephrosis.    BLADDER: Partially distended, unremarkable.  REPRODUCTIVE ORGANS: Prostate is enlarged    BOWEL: No bowel obstruction. Appendix is normal. Uncomplicated sigmoid   diverticula. Extension of a loop of small bowel into the umbilical hernia.  PERITONEUM: No ascites.  VESSELS: Common origin of the celiac and superior mesenteric arteries.   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 Correlate clinically and   if needed further evaluation with CT angiography.  RETROPERITONEUM/LYMPH NODES: No lymphadenopathy.  ABDOMINAL WALL: Umbilical hernia containing a short segment of   unobstructed small bowel.  BONES: Degenerative changes. Grade 1 spondylolisthesis L5-S1 level.    IMPRESSION:  No imaging explanation for left flank pain.    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  Uncomplicated sigmoid diverticulosis and additional findings as above.    You were admitted difficulty breathing   You were diagnosed with  COPD Exacerbation   You were treated with Course of steriods (prednisone), antibiotics (azithromycin) and inhalers Simbicort and spriva   You were prescribed the following new medications:    You will need to follow up with your primary care physician.    Source of Infection: COPD   Antibiotic / Last Day:8/14 finish azithromycin 500mg     Palliative Care / Advanced Care Planning  Code Status: full   Patient/Family agreeable to Hospice/Palliative (N)?  Summary of Goals of Care Conversation: NA     Discharging Provider:  Macy RENEE  Contact Info: Cell [phone]-- Please call with any questions or concerns.    Outpatient Provider:  Dr Barry       Hospital Course  HPI:  61 y/o M with HLD, GERD, hx of DVT on Xarelto, COPD, still smokes 1ppd, until today, presents to the ED, for worsening dyspnea.  Pt states he has been having dyspnea and cough  for about 3 weeks.  He went to urgent care ctr 3 weeks ago, was prescribed, antibx, Albuterol prn and short course of Prednisone, with sl improvement.  But symptoms recurred again this week, states cough occ prod with greenish phlegm and last night, dyspnea worsened, was unable to sleep.  This evening, checked his O2 sat and it was 87% on room air.  Pt treated with Prednisone 40mg daily to be weaned every other day by 10mg untill off .  Pt also given  azithromycin 500mg po daily for a course of 5 days .  Pt given respiratory treatment with albuterol q6 , spiriva daily and symbicort bid .  Pt seen by DR Padilla pulmonary for consult. and for follow up after hospitalization.  Pt had echo completed.   abd ct preformed for  right flank pain- results below   Echo  Summary:   1. Left ventricular ejection fraction, by visual estimation, is 55 to   60%.   2. Normal global left ventricular systolic function.   3. Normal left ventricular internal cavity size.   4. Spectral Doppler shows impaired relaxation pattern of left   ventricular myocardial filling (Grade I diastolic dysfunction).   5. There is mild asymmetric left ventricular hypertrophy.   6. Mildly enlarged right atrium.   7. Moderately enlarged left atrium.   8. Trace mitral valve regurgitation.   9. Sclerotic aortic valve with normal opening.    [id number] Justin Christie MD, [facility] , Electronically signed on   8/11/2022 at 2:54:28 PM    CT of the Abdomen and Pelvis was performed.  Sagittal and coronal reformats were performed.    FINDINGS:  LOWER CHEST: Bibasilar reticular scarring/atelectasis. Coronary artery   calcification.    LIVER: 6 mm hypodense focus left hepatic lobe, too small to characterize.  BILE DUCTS: Normal caliber.  GALLBLADDER: Within normal limits.  SPLEEN: Within normal limits.  PANCREAS: Within normal limits.  ADRENALS: Within normal limits.  KIDNEYS/URETERS: No evidence of renal calculi or hydronephrosis.    BLADDER: Partially distended, unremarkable.  REPRODUCTIVE ORGANS: Prostate is enlarged    BOWEL: No bowel obstruction. Appendix is normal. Uncomplicated sigmoid   diverticula. Extension of a loop of small bowel into the umbilical hernia.  PERITONEUM: No ascites.  VESSELS: Common origin of the celiac and superior mesenteric arteries.   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 Correlate clinically and   if needed further evaluation with CT angiography.  RETROPERITONEUM/LYMPH NODES: No lymphadenopathy.  ABDOMINAL WALL: Umbilical hernia containing a short segment of   unobstructed small bowel.  BONES: Degenerative changes. Grade 1 spondylolisthesis L5-S1 level.    IMPRESSION:  No imaging explanation for left flank pain.    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  Uncomplicated sigmoid diverticulosis and additional findings as above.    You were admitted difficulty breathing   You were diagnosed with  COPD Exacerbation   You were treated with Course of steriods (prednisone), antibiotics (azithromycin) and inhalers Simbicort and spriva   You were prescribed the following new medications:    You will need to follow up with your primary care physician.    Source of Infection: COPD   Antibiotic / Last Day:8/14 finish azithromycin 500mg     Palliative Care / Advanced Care Planning  Code Status: full   Patient/Family agreeable to Hospice/Palliative (N)?  Summary of Goals of Care Conversation: NA     Discharging Provider:  Macy RENEE  Contact Info: Cell [phone]-- Please call with any questions or concerns.    Outpatient Provider:  Dr Barry       Hospital Course  HPI:  61 y/o M with HLD, GERD, hx of DVT on Xarelto, COPD, still smokes 1ppd, until today, presents to the ED, for worsening dyspnea.  Pt states he has been having dyspnea and cough  for about 3 weeks.  He went to urgent care ctr 3 weeks ago, was prescribed, antibx, Albuterol prn and short course of Prednisone, with sl improvement.  But symptoms recurred again this week, states cough occ prod with greenish phlegm and last night, dyspnea worsened, was unable to sleep.  This evening, checked his O2 sat and it was 87% on room air.  Pt treated with Prednisone 40mg daily to be weaned every other day by 10mg untill off .  Pt also given  azithromycin 500mg po daily for a course of 5 days .  Pt given respiratory treatment with albuterol q6 , spiriva daily and symbicort bid .  Pt seen by DR Padilla pulmonary for consult. and for follow up after hospitalization.  Pt had echo completed.   abd ct preformed for  right flank pain- results below   Echo  Summary:   1. Left ventricular ejection fraction, by visual estimation, is 55 to   60%.   2. Normal global left ventricular systolic function.   3. Normal left ventricular internal cavity size.   4. Spectral Doppler shows impaired relaxation pattern of left   ventricular myocardial filling (Grade I diastolic dysfunction).   5. There is mild asymmetric left ventricular hypertrophy.   6. Mildly enlarged right atrium.   7. Moderately enlarged left atrium.   8. Trace mitral valve regurgitation.   9. Sclerotic aortic valve with normal opening.    [id number] Justin Christie MD, [facility] , Electronically signed on   8/11/2022 at 2:54:28 PM    CT of the Abdomen and Pelvis was performed.  Sagittal and coronal reformats were performed.    FINDINGS:  LOWER CHEST: Bibasilar reticular scarring/atelectasis. Coronary artery   calcification.    LIVER: 6 mm hypodense focus left hepatic lobe, too small to characterize.  BILE DUCTS: Normal caliber.  GALLBLADDER: Within normal limits.  SPLEEN: Within normal limits.  PANCREAS: Within normal limits.  ADRENALS: Within normal limits.  KIDNEYS/URETERS: No evidence of renal calculi or hydronephrosis.    BLADDER: Partially distended, unremarkable.  REPRODUCTIVE ORGANS: Prostate is enlarged    BOWEL: No bowel obstruction. Appendix is normal. Uncomplicated sigmoid   diverticula. Extension of a loop of small bowel into the umbilical hernia.  PERITONEUM: No ascites.  VESSELS: Common origin of the celiac and superior mesenteric arteries.   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 Correlate clinically and   if needed further evaluation with CT angiography.  RETROPERITONEUM/LYMPH NODES: No lymphadenopathy.  ABDOMINAL WALL: Umbilical hernia containing a short segment of   unobstructed small bowel.  BONES: Degenerative changes. Grade 1 spondylolisthesis L5-S1 level.    IMPRESSION:  No imaging explanation for left flank pain.    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  Uncomplicated sigmoid diverticulosis and additional findings as above.    You were admitted difficulty breathing   You were diagnosed with  COPD Exacerbation   You were treated with Course of steriods (prednisone), antibiotics (azithromycin) and inhalers albuterol and spriva   You were prescribed the following new medications:  Prednisone taper, azithromycin 500mg X3 days, albuterol inhaler prn, and spiriva daily, Nicoderm patch and nicotrol inhalers     You will need to follow up with your primary care physician.    Source of Infection: COPD   Antibiotic / Last Day:8/14 finish azithromycin 500mg     Palliative Care / Advanced Care Planning  Code Status: full   Patient/Family agreeable to Hospice/Palliative (N)?  Summary of Goals of Care Conversation: NA     Discharging Provider:  Macy RENEE  Contact Info: Cell [phone]-- Please call with any questions or concerns.    Outpatient Provider:  Dr Barry

## 2022-08-12 RX ORDER — ALBUTEROL 90 UG/1
2 AEROSOL, METERED ORAL
Qty: 240 | Refills: 0
Start: 2022-08-12 | End: 2022-09-10

## 2022-08-16 ENCOUNTER — NON-APPOINTMENT (OUTPATIENT)
Age: 62
End: 2022-08-16

## 2022-09-29 NOTE — H&P ADULT - RESPIRATORY AND THORAX
09/29/22    Spoke with patient reminding updated labs prior to appt  Pt stated she got her labs done in nursing home and will bring them in on her Monday's appt 
details…

## 2022-10-04 ENCOUNTER — OUTPATIENT (OUTPATIENT)
Dept: OUTPATIENT SERVICES | Facility: HOSPITAL | Age: 62
LOS: 1 days | Discharge: HOME | End: 2022-10-04

## 2022-10-04 DIAGNOSIS — Z98.89 OTHER SPECIFIED POSTPROCEDURAL STATES: Chronic | ICD-10-CM

## 2022-10-04 DIAGNOSIS — Z96.652 PRESENCE OF LEFT ARTIFICIAL KNEE JOINT: Chronic | ICD-10-CM

## 2022-10-05 ENCOUNTER — NON-APPOINTMENT (OUTPATIENT)
Age: 62
End: 2022-10-05

## 2022-10-05 ENCOUNTER — APPOINTMENT (OUTPATIENT)
Dept: CARDIOLOGY | Facility: CLINIC | Age: 62
End: 2022-10-05

## 2022-10-05 VITALS
BODY MASS INDEX: 36.96 KG/M2 | RESPIRATION RATE: 20 BRPM | SYSTOLIC BLOOD PRESSURE: 147 MMHG | HEIGHT: 74 IN | TEMPERATURE: 96.3 F | HEART RATE: 77 BPM | WEIGHT: 288 LBS | DIASTOLIC BLOOD PRESSURE: 84 MMHG | OXYGEN SATURATION: 96 %

## 2022-10-05 PROCEDURE — 93000 ELECTROCARDIOGRAM COMPLETE: CPT

## 2022-10-05 PROCEDURE — 99214 OFFICE O/P EST MOD 30 MIN: CPT | Mod: 25

## 2022-10-06 ENCOUNTER — APPOINTMENT (OUTPATIENT)
Dept: HEMATOLOGY ONCOLOGY | Facility: CLINIC | Age: 62
End: 2022-10-06

## 2022-10-06 VITALS
HEART RATE: 79 BPM | OXYGEN SATURATION: 98 % | HEIGHT: 73.98 IN | RESPIRATION RATE: 16 BRPM | SYSTOLIC BLOOD PRESSURE: 158 MMHG | TEMPERATURE: 97.4 F | WEIGHT: 287.26 LBS | DIASTOLIC BLOOD PRESSURE: 95 MMHG | BODY MASS INDEX: 36.87 KG/M2

## 2022-10-06 PROCEDURE — 99215 OFFICE O/P EST HI 40 MIN: CPT

## 2022-10-06 NOTE — CONSULT LETTER
[Dear  ___] : Dear  [unfilled], [Courtesy Letter:] : I had the pleasure of seeing your patient, [unfilled], in my office today. [Please see my note below.] : Please see my note below. [Consult Closing:] : Thank you very much for allowing me to participate in the care of this patient.  If you have any questions, please do not hesitate to contact me. [Sincerely,] : Sincerely, [FreeTextEntry3] : Valerie Reid M.D.

## 2022-10-06 NOTE — PHYSICAL EXAM
[Normal] : affect appropriate [de-identified] : No calf tenderness. RLE slightly>LLE. Trace B/L ankle edema.

## 2022-10-06 NOTE — ASSESSMENT
[FreeTextEntry1] : Lab work reviewed.\par Patient with history of right lower extremity DVT, and lupus anticoagulant-discussed potential thrombotic risks without anticoagulation to be considered versus bleeding risks with anticoagulation. \par Explained potential implications of a positive lupus anticoagulant/phospholipid antibody associated with thrombotic disease.  Would like to have lupus anticoagulant tested when patient off anticoagulation if possible, as prior testing was done while he was taking Xarelto from what I can glean from his history.  Discussed anticoagulation in the setting of a positive lupus anticoagulant, with recommendation for indefinite Coumadin rather than Xarelto. \par Patient wishes to have a follow-up lower extremity venous duplex study performed–if negative for DVT, he will hold his Xarelto x3 days and have the lupus anticoagulant checked.  If persistently +LA, advised that he change anticoagulation to Coumadin. Patient does not wish to change to Coumadin, though expressed his understanding of recommendation.\par \par Telephone call to Dr. Elena and discussed case with him.\par \par Patient was given the opportunity to ask questions. His questions have been answered to his satisfaction.

## 2022-10-06 NOTE — HISTORY OF PRESENT ILLNESS
[de-identified] : Patient underwent left knee arthroscopic surgery 8/2014. Approximately 2 weeks postoperatively, he noticed right lower extremity swelling and pain. He sought attention at an urgent care center, and had a right lower extremity ultrasound which revealed a DVT within the right popliteal, proximal and mid posterior tibial veins.He was begun on Xarelto.He had no prior history of venous thromboembolic disease nor any family history of venous thromboembolism.\par Prothrombin gene mutation study normal. APC resistance within normal limits. +LA.  ROBBIE WNL.  Maintained on Xarelto . [de-identified] : First office visit since 2/2018-back to discuss anticoagulant options.\par Recently hospitalized for COPD exacerbation.\par No current complaints of chest pain, shortness of breath, calf pain. No abnormal bleeding reported.\par \par \par Has Had COVID vaccines

## 2022-10-18 ENCOUNTER — APPOINTMENT (OUTPATIENT)
Dept: PULMONOLOGY | Facility: CLINIC | Age: 62
End: 2022-10-18

## 2022-10-21 ENCOUNTER — TRANSCRIPTION ENCOUNTER (OUTPATIENT)
Age: 62
End: 2022-10-21

## 2022-10-21 ENCOUNTER — EMERGENCY (EMERGENCY)
Facility: HOSPITAL | Age: 62
LOS: 1 days | Discharge: HOME | End: 2022-10-21
Attending: EMERGENCY MEDICINE
Payer: COMMERCIAL

## 2022-10-21 VITALS
OXYGEN SATURATION: 93 % | HEIGHT: 74 IN | HEART RATE: 103 BPM | RESPIRATION RATE: 24 BRPM | DIASTOLIC BLOOD PRESSURE: 91 MMHG | WEIGHT: 270.07 LBS | SYSTOLIC BLOOD PRESSURE: 137 MMHG | TEMPERATURE: 98 F

## 2022-10-21 VITALS — OXYGEN SATURATION: 92 % | HEART RATE: 89 BPM | RESPIRATION RATE: 20 BRPM

## 2022-10-21 DIAGNOSIS — Z98.89 OTHER SPECIFIED POSTPROCEDURAL STATES: Chronic | ICD-10-CM

## 2022-10-21 DIAGNOSIS — Z96.652 PRESENCE OF LEFT ARTIFICIAL KNEE JOINT: Chronic | ICD-10-CM

## 2022-10-21 LAB
RAPID RVP RESULT: DETECTED
SARS-COV-2 RNA SPEC QL NAA+PROBE: DETECTED

## 2022-10-21 PROCEDURE — 99284 EMERGENCY DEPT VISIT MOD MDM: CPT

## 2022-10-21 PROCEDURE — 0225U NFCT DS DNA&RNA 21 SARSCOV2: CPT

## 2022-10-21 PROCEDURE — 71046 X-RAY EXAM CHEST 2 VIEWS: CPT | Mod: 26

## 2022-10-21 PROCEDURE — 99285 EMERGENCY DEPT VISIT HI MDM: CPT | Mod: 25

## 2022-10-21 PROCEDURE — 71046 X-RAY EXAM CHEST 2 VIEWS: CPT

## 2022-10-21 NOTE — ED ADULT NURSE NOTE - OBJECTIVE STATEMENT
62y/M c/o SOB, covid+ today from home test, currently treated for pneumonia x 3 days. Has home O2 as needed, states took rescue inhaler albuterol this morning. Pt has hx of copd, emphysema. Endorsed baseline O2 sat 92-93%. Here in ED with O2 sat ranging bet 90-93%.

## 2022-10-21 NOTE — ED PROVIDER NOTE - PROGRESS NOTE DETAILS
Jayleen Harris PGY1: Pt offered admission for hypoxia due to covid. Pt understands recommendations and potential worsening of illness. Pt denies admission and would like to go home. Pt has home O2 if needed and understands strict returns precautions. Pt would like to get MAB. Will talk to patient referrals for set up as pt is not a candidate for paxlovid due to drug interactions.

## 2022-10-21 NOTE — ED PROVIDER NOTE - OBJECTIVE STATEMENT
63 y/o M with PMHx of recently diagnosed COPD, HTN presents to the ED for covid infection. Pt states he has had fevers, generalized weakness and cough for the last 5 days. Pt went to urgent care 2 days ago and was diagnosed with right lobar PNA. Pt states he tested positive for covid on home test today. Pt endorses improvement in symptoms today. Pt concerned due to recent dx of COPD. Pt states his sp02 has been stable at home. Pt denies chest pain, hemoptysis, abdominal pain, n/v/d/c, dysuria, hematuria.

## 2022-10-21 NOTE — ED PROVIDER NOTE - PATIENT PORTAL LINK FT
You can access the FollowMyHealth Patient Portal offered by [facility] by registering at the following website: [email or web address]. By joining [facility]’s FollowMyHealth portal, you will also be able to view your health information using other applications (apps) compatible with our system.

## 2022-10-21 NOTE — ED PROVIDER NOTE - PHYSICAL EXAMINATION
Constitutional: VS reviewed. Alert and orientedx3, well appearing, no apparent distress  HEENT: Atraumatic, EOMI  CV: tachycardia  Lungs: Clear and equal bilaterally, no wheezes, rales or crackles  Abdomen: Soft, nondistended, nontender  MSK: No deformities  Skin: Warm and dry. As visualized no rashes, lesions, bruising or erythema  Neuro: Appears nonfocal  Lymph: No pitting edema in extremities.

## 2022-10-21 NOTE — ED PROVIDER NOTE - NSFOLLOWUPINSTRUCTIONS_ED_ALL_ED_FT
What is a coronavirus?  Coronaviruses are a large family of viruses that cause illnesses ranging from the common cold to more severe diseases such as Middle East Respiratory Syndrome (MERS) and Severe Acute Respiratory Syndrome (SARS).    What is Novel Coronavirus (COVID-19)?  The Centers for Disease Control and Prevention (CDC) is closely monitoring the outbreak caused by COVID-19. For the latest information about COVID-19, visit the CDC website at CDC.gov/Coronavirus    How are coronaviruses spread?  Coronaviruses can be transmitted from person to person, usually after close contact with an infected  person (for example, in a household, workplace, or healthcare setting), via droplets that become airborne after a cough or sneeze. These droplets can then infect a nearby person. Transmission can also occur by touching recently contaminated surfaces.    Is there a treatment for a COVID-19?  There is no specific treatment for disease caused by COVID-19. However, many of the symptoms can be treated based on the patient’s clinical condition. Supportive care for infected persons can be highly effective.    What are the symptoms of coronavirus infection?  It depends on the virus, but common signs include fever and/or respiratory symptoms such as cough and shortness of breath. In more severe cases, infection can cause pneumonia, severe acute respiratory syndrome, kidney failure and even death. Fortunately, most cases of COVID-19 have an illness no different than the influenza (flu), with a majority of these patients having mild symptoms and overall mortality which appears to be not much different than the flu.    What can I do to protect myself?  The best precautionary measures:  – washing your hands  – covering your cough  – disinfecting surfaces  – it is also advisable to avoid close contact with anyone showing symptoms of respiratory illness such as coughing and sneezing  – those with symptoms should wear a surgical mask when around others    What can I do to protect those around me?  If you have been identified as someone who may be infected with COVID-19, we recommend you follow the self-isolation procedures outlined on the following page to protect those around you and to limit the spread of this virus.    We recommend the below precautionary steps from now until 14 days from when you returned from your travel or date of your last known possible contact:    — Do not go to work, school or public areas. Avoid using public transportation, ridesharing or taxis.  — As much as possible, separate yourself from other people in your home. If you can, you should stay in a room and away from other people. Also, you should use a separate bathroom if available.  — Wear the supplied mask whenever you are around other people.  — If you have a non-urgent medical appointment, please reschedule for a later date. If the appointment is urgent, please call the health care provider and tell them that you are on self-isolation for possible COVID-19. This will help the health care provider’s office take steps to keep other people from getting infected or exposed. If you can reschedule routine appointments, do so.  — Wash your hands often with soap and water for at least 15 to 20 seconds or clean your hands with an alcohol-based hand  that contains 60 to 95% alcohol, covering all surfaces of your hands and rubbing them together until they feel dry. Soap and water should be used preferentially if hands are visibly dirty.  — Cover your mouth and nose with a tissue when you cough or sneeze. Throw used tissues in a lined trash can. Immediately wash your hands.  — Avoid touching your eyes, nose, and mouth with your hands.  — Avoid sharing personal household items. You should not share dishes, drinking glasses, cups, eating utensils, towels, or bedding with other people or pets in your home. After using these items, they should be washed thoroughly with soap and water.  — Clean and disinfect all “high-touch” surfaces every day. High touch surfaces include counters, tabletops, doorknobs, light switches, remote controls, bathroom fixtures, toilets, phones, keyboards, tablets, and bedside tables. Also, clean any surfaces that may have blood, stool, or body fluids on them.

## 2022-10-21 NOTE — ED PROVIDER NOTE - ATTENDING CONTRIBUTION TO CARE
I, Devon Kuhn, performed a history and physical exam of the patient and discussed their management with the resident and /or advanced care provider. I reviewed the resident and /or ACP's note and agree with the documented findings and plan of care. I was present and available for all procedures.  Patient with O2 sat above 95% diagnosed with COPD in recent history tested positive for COVID-19 yesterday.  Patient non-toxic in no apparent distress and breathing comfortably. Will evaluate chest xray and assure close follow-up. Patient may benefit from paxlovid, however considering multiple interaction with medications patient needs to be followed closely or the medication should be deferred. Patient stable.

## 2022-10-21 NOTE — ED PROVIDER NOTE - CLINICAL SUMMARY MEDICAL DECISION MAKING FREE TEXT BOX
63 y/o M with PMHx of recently diagnosed COPD, HTN presents to the ED for covid infection. Pt states he has had fevers, generalized weakness and cough for the last 5 days. Pt went to urgent care 2 days ago and was diagnosed with right lobar PNA. Pt states he tested positive for covid on home test today. Pt endorses improvement in symptoms today. Pt concerned due to recent dx of COPD. Differentials include but limited to covid pneumonia, focal lobar pneumonia, other viral illness, COPD exacerbation. Will get CXR and RVP. Will offer Paxlovid. Dispo likely home with pulm f/u.

## 2022-10-22 ENCOUNTER — APPOINTMENT (OUTPATIENT)
Dept: DISASTER EMERGENCY | Facility: HOSPITAL | Age: 62
End: 2022-10-22

## 2022-10-22 ENCOUNTER — OUTPATIENT (OUTPATIENT)
Dept: OUTPATIENT SERVICES | Facility: HOSPITAL | Age: 62
LOS: 1 days | End: 2022-10-22

## 2022-10-22 VITALS
WEIGHT: 279.99 LBS | SYSTOLIC BLOOD PRESSURE: 158 MMHG | HEIGHT: 74 IN | HEART RATE: 90 BPM | TEMPERATURE: 97 F | DIASTOLIC BLOOD PRESSURE: 90 MMHG | OXYGEN SATURATION: 94 % | RESPIRATION RATE: 18 BRPM

## 2022-10-22 VITALS
RESPIRATION RATE: 17 BRPM | OXYGEN SATURATION: 93 % | SYSTOLIC BLOOD PRESSURE: 129 MMHG | DIASTOLIC BLOOD PRESSURE: 91 MMHG | HEART RATE: 82 BPM | TEMPERATURE: 98 F

## 2022-10-22 DIAGNOSIS — U07.1 COVID-19: ICD-10-CM

## 2022-10-22 DIAGNOSIS — Z98.89 OTHER SPECIFIED POSTPROCEDURAL STATES: Chronic | ICD-10-CM

## 2022-10-22 DIAGNOSIS — Z96.652 PRESENCE OF LEFT ARTIFICIAL KNEE JOINT: Chronic | ICD-10-CM

## 2022-10-22 RX ORDER — BEBTELOVIMAB 87.5 MG/ML
175 INJECTION, SOLUTION INTRAVENOUS ONCE
Refills: 0 | Status: COMPLETED | OUTPATIENT
Start: 2022-10-22 | End: 2022-10-22

## 2022-10-22 RX ADMIN — BEBTELOVIMAB 175 MILLIGRAM(S): 87.5 INJECTION, SOLUTION INTRAVENOUS at 10:33

## 2022-10-22 NOTE — CHART NOTE - NSCHARTNOTEFT_GEN_A_CORE
CC: Monoclonal Antibody Infusion/COVID 19 Positive  62y Male with PMH of COPD Emphesema, HLD, DVT   and recent dx of COVID 19+ who presents today for elective Bebtelovimab. Patient has been screened and was deemed to be a candidate.    Symptoms/ Criteria  Date of Symptom Onset: 10/20/2022  Symptoms: Cough, Congestion  Date of Positive COVID PCR: 10/21/2022  Risk Profile includes:  Chronic lung disease, Obesity    Vaccination Status: Received Pfizer COVID vaccine and one booster dose    PMHx:  Family history of Alzheimer&#x27;s disease (Mother)    Family history of heart attack (Father)    Infection due to severe acute respiratory syndrome coronavirus 2 (SARS-CoV-2)    GERD (gastroesophageal reflux disease)    Bronchitis    Pneumonia    Basal cell carcinoma    Hyperlipidemia    DVT (deep venous thrombosis)    S/P laminectomy    S/P knee surgery    H/O total knee replacement, left    SysAdmin_VisitLink        Exam/findings:  T(C): 37.1 (10-21-22 @ 14:34), Max: 37.1 (10-21-22 @ 14:34)  HR: 89 (10-21-22 @ 18:00) (81 - 103)  BP: 123/82 (10-21-22 @ 14:34) (123/82 - 137/91)  RR: 20 (10-21-22 @ 18:00) (19 - 24)  SpO2: 92% (10-21-22 @ 18:00) (87% - 93%)    PE:   Appearance: NAD	  HEENT:  NC/AT  Cardiovascular:  No edema  Respiratory: no use of accessory muscles  Gastrointestinal:  non-distended   Skin: warm and dry  Neurologic: Non-focal  Extremities: Normal range of motion    ASSESSMENT:  Pt is COVID positive with mild to moderate symptoms who was referred for elective MAB (Bebtelovimab).    PLAN:  - MAB treatment explained to patient. I have reviewed the Bebtelovimab Emergency Use Authorization (EUA) and I have provided the patient or patient's caregiver with the following information:   1. FDA has authorized emergency use of Bebtelovimab to be administered for the treatment of mild to moderate COVID-19, which is not an FDA-approved biological product.   2. The patient or patient's caregiver has the option to accept or refuse administration of MAB.   3. The significant known and potential risks and benefits of Bebtelovimab and the extent to which such risks and benefits are unknown.  4. Information on available alternative treatments and risks and benefits of those alternatives.  - Patient verbalized understanding of plan and agrees to treatment. All questions answered.  - Consent for MAB obtained.   - 175mg of Bebtelovimab administered as a single intravenous injection over at least 30 seconds.   - Observe patient for one hour post medication administration and then if stable, discharge home with outpatient follow up as planned by PCP.      POST ASSESSMENT:   Patient completed MAB, and monitored x 1 hour post-infusion with no adverse reactions noted, remained hemodynamically stable.  - Patient tolerated infusion well; denies complaints of chest pain/SOB/dizziness/palpitations.   - VSS for discharge home.  - D/C instructions given/ fact sheet included.  - Patient was instructed to self-isolate and use infection control measures (e.g wear mask, isolate, social distance, avoid sharing personal items, clean and disinfect "high touch" surfaces, and frequent handwashing according to the CDC guidelines.   - The patient was informed on what symptoms to be aware of for the next couple of days, and if there are any issues to call the 24/7 clinical call center. Patient was instructed to follow up with primary care provider as needed.    DISCHARGE stable

## 2022-10-23 ENCOUNTER — TRANSCRIPTION ENCOUNTER (OUTPATIENT)
Age: 62
End: 2022-10-23

## 2022-10-24 ENCOUNTER — APPOINTMENT (OUTPATIENT)
Dept: HEMATOLOGY ONCOLOGY | Facility: CLINIC | Age: 62
End: 2022-10-24

## 2022-10-24 PROCEDURE — 99214 OFFICE O/P EST MOD 30 MIN: CPT | Mod: 95

## 2022-10-24 RX ORDER — BROMPHENIRAMINE MALEATE, PSEUDOEPHEDRINE HYDROCHLORIDE, 2; 30; 10 MG/5ML; MG/5ML; MG/5ML
30-2-10 SYRUP ORAL
Qty: 200 | Refills: 0 | Status: ACTIVE | COMMUNITY
Start: 2022-10-19

## 2022-10-24 RX ORDER — LEVOFLOXACIN 500 MG/1
500 TABLET, FILM COATED ORAL
Qty: 7 | Refills: 0 | Status: ACTIVE | COMMUNITY
Start: 2022-10-19

## 2022-10-24 NOTE — RESULTS/DATA
[FreeTextEntry1] : 10/12/22–lower extremity venous duplex study (Dr. Elena)–no evidence of DVT or deep venous insufficiency on the right lower extremity.  No evidence of DVT or deep venous insufficiency of the left lower extremity.

## 2022-10-24 NOTE — HISTORY OF PRESENT ILLNESS
[de-identified] : Patient underwent left knee arthroscopic surgery 8/2014. Approximately 2 weeks postoperatively, he noticed right lower extremity swelling and pain. He sought attention at an urgent care center, and had a right lower extremity ultrasound which revealed a DVT within the right popliteal, proximal and mid posterior tibial veins.He was begun on Xarelto.He had no prior history of venous thromboembolic disease nor any family history of venous thromboembolism.\par Prothrombin gene mutation study normal. APC resistance within normal limits. +LA.  ROBBIE WNL.  Maintained on Xarelto .\par \par 2/2018-10/2022-No hematology f/u.\par \par 10/2022-Returned for hematology f/u. [de-identified] : Had pneumonia-had COVID 5 days ago.\par No current complaints of chest pain, shortness of breath, calf pain. No abnormal bleeding reported. No fevers.\par \par \par Has Had COVID vaccines

## 2022-10-24 NOTE — ASSESSMENT
[FreeTextEntry1] : LE venous duplex study report reviewed.\par Patient with history of right lower extremity DVT, and lupus anticoagulant-reviewed potential thrombotic risks without anticoagulation to be considered versus bleeding risks with anticoagulation. \par Explained potential implications of a positive lupus anticoagulant/phospholipid antibody associated with thrombotic disease.  Would like to have lupus anticoagulant tested when patient off anticoagulation if possible, as prior testing was done while he was taking Xarelto from what I can glean from his history.  Discussed anticoagulation in the setting of a positive lupus anticoagulant, with recommendation for indefinite Coumadin rather than Xarelto. \par \par With recent COVID infection and pneumonia, he will continue xarelto x 30 days. As current LE study negative for DVT, he will then hold his Xarelto x 3 days and have the lupus anticoagulant checked. If persistently +LA, advised that he change anticoagulation to Coumadin. If LA is negative off xarelto, he will continue xarelto x 12 weeks, and have it [id number] still negative, may holding anticoagulation at that point.\par \par Patient was given the opportunity to ask questions. His questions have been answered to his satisfaction.

## 2022-10-24 NOTE — REASON FOR VISIT
[Home] : at home, [unfilled] , at the time of the visit. [Medical Office: ([facility])___] : at the medical office located in  [Patient] : the patient [Self] : self [Follow-Up Visit] : a follow-up visit for [FreeTextEntry2] : DVT

## 2022-10-24 NOTE — PHYSICAL EXAM
[Normal] : affect appropriate [de-identified] : non-toxic appearing [de-identified] : breathing appeared unlabored [de-identified] : coloration appeared normal

## 2022-10-25 ENCOUNTER — TRANSCRIPTION ENCOUNTER (OUTPATIENT)
Age: 62
End: 2022-10-25

## 2022-11-05 ENCOUNTER — APPOINTMENT (OUTPATIENT)
Dept: ORTHOPEDIC SURGERY | Facility: CLINIC | Age: 62
End: 2022-11-05

## 2022-11-05 VITALS — HEIGHT: 74 IN | WEIGHT: 287 LBS | BODY MASS INDEX: 36.83 KG/M2

## 2022-11-05 DIAGNOSIS — M25.561 PAIN IN RIGHT KNEE: ICD-10-CM

## 2022-11-05 PROCEDURE — 99204 OFFICE O/P NEW MOD 45 MIN: CPT | Mod: 25

## 2022-11-05 PROCEDURE — 20610 DRAIN/INJ JOINT/BURSA W/O US: CPT | Mod: RT

## 2022-11-05 PROCEDURE — J3490M: CUSTOM

## 2022-11-05 PROCEDURE — 73562 X-RAY EXAM OF KNEE 3: CPT | Mod: RT

## 2022-11-05 NOTE — ASSESSMENT
[FreeTextEntry1] : Discussed anti-inflammatories, PT/HEP, CSI, visco injections, and briefly TKA. Discussed TKA, r/b/a, and preop/postop periods in detail. He is well informed and would like to proceed with Mobic and CSI today, tolerated well. f/up 6 weeks if symptoms fail to improve or worsen. Consider visco series

## 2022-11-05 NOTE — PROCEDURE
[Large Joint Injection] : Large joint injection [Right] : of the right [Knee] : knee [Pain] : pain [Inflammation] : inflammation [X-ray evidence of Osteoarthritis on this or prior visit] : x-ray evidence of Osteoarthritis on this or prior visit [Alcohol] : alcohol [Betadine] : betadine [Ethyl Chloride sprayed topically] : ethyl chloride sprayed topically [Sterile technique used] : sterile technique used [___ cc    3mg] :  Betamethasone (Celestone) ~Vcc of 3mg [___ cc    0.25%] : Bupivacaine (Marcaine) ~Vcc of 0.25%  [Call if redness, pain or fever occur] : call if redness, pain or fever occur [] : Patient tolerated procedure well [Apply ice for 15min out of every hour for the next 12-24 hours as tolerated] : apply ice for 15 minutes out of every hour for the next 12-24 hours as tolerated [Patient was advised to rest the joint(s) for ____ days] : patient was advised to rest the joint(s) for [unfilled] days [Previous OTC use and PT nontherapeutic] : patient has tried OTC's including aspirin, Ibuprofen, Aleve, etc or prescription NSAIDS, and/or exercises at home and/or physical therapy without satisfactory response [Risks, benefits, alternatives discussed / Verbal consent obtained] : the risks benefits, and alternatives have been discussed, and verbal consent was obtained

## 2022-11-05 NOTE — PHYSICAL EXAM
[Right] : right knee [5___] : hamstring 5[unfilled]/5 [] : no erythema [TWNoteComboBox7] : flexion 100 degrees [de-identified] : extension 3 degrees

## 2022-11-05 NOTE — HISTORY OF PRESENT ILLNESS
[8] : 8 [Dull/Aching] : dull/aching [Localized] : localized [Standing] : standing [Walking] : walking [Stairs] : stairs [Retired] : Work status: retired [de-identified] : 63yo M with right knee pain for the past week with no traumatic injury. Hx of meniscus tear and knee arthroscopy ~2014. States he had been doing relatively well until recently. He has done CSI and visco in the past. Admits to increasing pain and difficulty with prolonged walking and stairs. Hx of L TKA with outside ortho. [] : Post Surgical Visit: no [FreeTextEntry1] : Rt knee [FreeTextEntry5] : Patient complains of knee pain since 1 week ago, pain is progressively getting worse\par no injury\par h/o meniscus repair

## 2022-11-05 NOTE — DISCUSSION/SUMMARY
[de-identified] : The natural progression of Osteoarthritis was explained to the patient.  We discussed the possible treatment options from conservative to operative.  These included NSAIDS, Glucosamine and Chondrotin sulfate, and Physical Therapy as well different types of injections.  We also discussed that at some point they may progress to needed a TKA.  Information and pamphlets were given.\par \par The patient was advised of the diagnosis.  The natural history of the pathology was explained in full to the patient in layman's terms. All questions were answered.  The risks and benefits of surgical and non-surgical treatment alternatives were explained in full to the patient.\par \par The risks, benefits, contents and alternatives to injection were explained in full to the patient.  Risks outlined include but are not limited to infection, sepsis, bleeding, scarring, skin discoloration, temporary increase in pain, syncopal episode, failure to resolve symptoms, allergic reaction, flare reaction, permanent white skin discoloration, symptom recurrence, and elevation of blood sugar in diabetics.  Patient understood the risks.  All questions were answered.  After discussion of options, patient requested an injection.  Oral informed consent was obtained and sterile prep was done of the injection site.  Sterile technique was used to introduce the mixture.  Patient tolerated the procedure well.  Patient advised to ice the injection site this evening.  Signs and symptoms of infection reviewed and patient advised to call immediately for redness, fevers, and/or chills. \par \par Progress note completed by Madhavi Cardona PA-C.

## 2022-11-24 ENCOUNTER — INPATIENT (INPATIENT)
Facility: HOSPITAL | Age: 62
LOS: 0 days | Discharge: HOME | DRG: 871 | End: 2022-11-25
Attending: STUDENT IN AN ORGANIZED HEALTH CARE EDUCATION/TRAINING PROGRAM | Admitting: STUDENT IN AN ORGANIZED HEALTH CARE EDUCATION/TRAINING PROGRAM
Payer: COMMERCIAL

## 2022-11-24 VITALS
SYSTOLIC BLOOD PRESSURE: 137 MMHG | HEART RATE: 138 BPM | HEIGHT: 74 IN | WEIGHT: 274.92 LBS | OXYGEN SATURATION: 89 % | RESPIRATION RATE: 24 BRPM | DIASTOLIC BLOOD PRESSURE: 90 MMHG | TEMPERATURE: 100 F

## 2022-11-24 DIAGNOSIS — A41.9 SEPSIS, UNSPECIFIED ORGANISM: ICD-10-CM

## 2022-11-24 DIAGNOSIS — Z98.89 OTHER SPECIFIED POSTPROCEDURAL STATES: Chronic | ICD-10-CM

## 2022-11-24 DIAGNOSIS — Z96.652 PRESENCE OF LEFT ARTIFICIAL KNEE JOINT: Chronic | ICD-10-CM

## 2022-11-24 DIAGNOSIS — J18.9 PNEUMONIA, UNSPECIFIED ORGANISM: ICD-10-CM

## 2022-11-24 DIAGNOSIS — E78.5 HYPERLIPIDEMIA, UNSPECIFIED: ICD-10-CM

## 2022-11-24 DIAGNOSIS — Z86.718 PERSONAL HISTORY OF OTHER VENOUS THROMBOSIS AND EMBOLISM: ICD-10-CM

## 2022-11-24 DIAGNOSIS — J44.0 CHRONIC OBSTRUCTIVE PULMONARY DISEASE WITH (ACUTE) LOWER RESPIRATORY INFECTION: ICD-10-CM

## 2022-11-24 DIAGNOSIS — Z29.9 ENCOUNTER FOR PROPHYLACTIC MEASURES, UNSPECIFIED: ICD-10-CM

## 2022-11-24 DIAGNOSIS — M19.90 UNSPECIFIED OSTEOARTHRITIS, UNSPECIFIED SITE: ICD-10-CM

## 2022-11-24 LAB
ALBUMIN SERPL ELPH-MCNC: 4.2 G/DL — SIGNIFICANT CHANGE UP (ref 3.3–5)
ALP SERPL-CCNC: 90 U/L — SIGNIFICANT CHANGE UP (ref 40–120)
ALT FLD-CCNC: 46 U/L — HIGH (ref 10–45)
ANION GAP SERPL CALC-SCNC: 12 MMOL/L — SIGNIFICANT CHANGE UP (ref 5–17)
APPEARANCE UR: CLEAR — SIGNIFICANT CHANGE UP
APTT BLD: 34.2 SEC — SIGNIFICANT CHANGE UP (ref 27.5–35.5)
AST SERPL-CCNC: 23 U/L — SIGNIFICANT CHANGE UP (ref 10–40)
BACTERIA # UR AUTO: NEGATIVE — SIGNIFICANT CHANGE UP
BASE EXCESS BLDV CALC-SCNC: 0.9 MMOL/L — SIGNIFICANT CHANGE UP (ref -2–3)
BASOPHILS # BLD AUTO: 0 K/UL — SIGNIFICANT CHANGE UP (ref 0–0.2)
BASOPHILS NFR BLD AUTO: 0 % — SIGNIFICANT CHANGE UP (ref 0–2)
BILIRUB SERPL-MCNC: 0.5 MG/DL — SIGNIFICANT CHANGE UP (ref 0.2–1.2)
BILIRUB UR-MCNC: ABNORMAL
BLOOD GAS VENOUS - CREATININE: SIGNIFICANT CHANGE UP MG/DL (ref 0.5–1.3)
BUN SERPL-MCNC: 18 MG/DL — SIGNIFICANT CHANGE UP (ref 7–23)
CA-I SERPL-SCNC: 1.19 MMOL/L — SIGNIFICANT CHANGE UP (ref 1.15–1.33)
CALCIUM SERPL-MCNC: 9.3 MG/DL — SIGNIFICANT CHANGE UP (ref 8.4–10.5)
CHLORIDE BLDV-SCNC: 101 MMOL/L — SIGNIFICANT CHANGE UP (ref 96–108)
CHLORIDE SERPL-SCNC: 102 MMOL/L — SIGNIFICANT CHANGE UP (ref 96–108)
CO2 BLDV-SCNC: 29 MMOL/L — HIGH (ref 22–26)
CO2 SERPL-SCNC: 23 MMOL/L — SIGNIFICANT CHANGE UP (ref 22–31)
COLOR SPEC: ABNORMAL
CREAT SERPL-MCNC: 0.9 MG/DL — SIGNIFICANT CHANGE UP (ref 0.5–1.3)
DIFF PNL FLD: NEGATIVE — SIGNIFICANT CHANGE UP
EGFR: 96 ML/MIN/1.73M2 — SIGNIFICANT CHANGE UP
EOSINOPHIL # BLD AUTO: 0 K/UL — SIGNIFICANT CHANGE UP (ref 0–0.5)
EOSINOPHIL NFR BLD AUTO: 0 % — SIGNIFICANT CHANGE UP (ref 0–6)
EPI CELLS # UR: 1 /HPF — SIGNIFICANT CHANGE UP
GAS PNL BLDV: 135 MMOL/L — LOW (ref 136–145)
GAS PNL BLDV: SIGNIFICANT CHANGE UP
GAS PNL BLDV: SIGNIFICANT CHANGE UP
GLUCOSE BLDV-MCNC: 115 MG/DL — HIGH (ref 70–99)
GLUCOSE SERPL-MCNC: 116 MG/DL — HIGH (ref 70–99)
GLUCOSE UR QL: NEGATIVE — SIGNIFICANT CHANGE UP
HCO3 BLDV-SCNC: 27 MMOL/L — SIGNIFICANT CHANGE UP (ref 22–29)
HCT VFR BLD CALC: 43.6 % — SIGNIFICANT CHANGE UP (ref 39–50)
HCT VFR BLDA CALC: 44 % — SIGNIFICANT CHANGE UP (ref 39–51)
HGB BLD CALC-MCNC: 14.5 G/DL — SIGNIFICANT CHANGE UP (ref 12.6–17.4)
HGB BLD-MCNC: 14.1 G/DL — SIGNIFICANT CHANGE UP (ref 13–17)
HYALINE CASTS # UR AUTO: 1 /LPF — SIGNIFICANT CHANGE UP (ref 0–2)
INR BLD: 1.78 RATIO — HIGH (ref 0.88–1.16)
KETONES UR-MCNC: SIGNIFICANT CHANGE UP
LACTATE BLDV-MCNC: 2 MMOL/L — SIGNIFICANT CHANGE UP (ref 0.5–2)
LEUKOCYTE ESTERASE UR-ACNC: NEGATIVE — SIGNIFICANT CHANGE UP
LYMPHOCYTES # BLD AUTO: 0.97 K/UL — LOW (ref 1–3.3)
LYMPHOCYTES # BLD AUTO: 5.2 % — LOW (ref 13–44)
MAGNESIUM SERPL-MCNC: 1.6 MG/DL — SIGNIFICANT CHANGE UP (ref 1.6–2.6)
MANUAL SMEAR VERIFICATION: SIGNIFICANT CHANGE UP
MCHC RBC-ENTMCNC: 29.8 PG — SIGNIFICANT CHANGE UP (ref 27–34)
MCHC RBC-ENTMCNC: 32.3 GM/DL — SIGNIFICANT CHANGE UP (ref 32–36)
MCV RBC AUTO: 92.2 FL — SIGNIFICANT CHANGE UP (ref 80–100)
MONOCYTES # BLD AUTO: 1.14 K/UL — HIGH (ref 0–0.9)
MONOCYTES NFR BLD AUTO: 6.1 % — SIGNIFICANT CHANGE UP (ref 2–14)
MRSA PCR RESULT.: SIGNIFICANT CHANGE UP
NEUTROPHILS # BLD AUTO: 16.59 K/UL — HIGH (ref 1.8–7.4)
NEUTROPHILS NFR BLD AUTO: 87.8 % — HIGH (ref 43–77)
NEUTS BAND # BLD: 0.9 % — SIGNIFICANT CHANGE UP (ref 0–8)
NITRITE UR-MCNC: NEGATIVE — SIGNIFICANT CHANGE UP
PCO2 BLDV: 48 MMHG — SIGNIFICANT CHANGE UP (ref 42–55)
PH BLDV: 7.36 — SIGNIFICANT CHANGE UP (ref 7.32–7.43)
PH UR: 6 — SIGNIFICANT CHANGE UP (ref 5–8)
PHOSPHATE SERPL-MCNC: 1.6 MG/DL — LOW (ref 2.5–4.5)
PLAT MORPH BLD: NORMAL — SIGNIFICANT CHANGE UP
PLATELET # BLD AUTO: 249 K/UL — SIGNIFICANT CHANGE UP (ref 150–400)
PO2 BLDV: 45 MMHG — SIGNIFICANT CHANGE UP (ref 25–45)
POTASSIUM BLDV-SCNC: 4 MMOL/L — SIGNIFICANT CHANGE UP (ref 3.5–5.1)
POTASSIUM SERPL-MCNC: 4.2 MMOL/L — SIGNIFICANT CHANGE UP (ref 3.5–5.3)
POTASSIUM SERPL-SCNC: 4.2 MMOL/L — SIGNIFICANT CHANGE UP (ref 3.5–5.3)
PROCALCITONIN SERPL-MCNC: 0.1 NG/ML — SIGNIFICANT CHANGE UP (ref 0.02–0.1)
PROCALCITONIN SERPL-MCNC: 0.48 NG/ML — HIGH (ref 0.02–0.1)
PROT SERPL-MCNC: 7.5 G/DL — SIGNIFICANT CHANGE UP (ref 6–8.3)
PROT UR-MCNC: ABNORMAL
PROTHROM AB SERPL-ACNC: 20.6 SEC — HIGH (ref 10.5–13.4)
RAPID RVP RESULT: SIGNIFICANT CHANGE UP
RBC # BLD: 4.73 M/UL — SIGNIFICANT CHANGE UP (ref 4.2–5.8)
RBC # FLD: 13.8 % — SIGNIFICANT CHANGE UP (ref 10.3–14.5)
RBC BLD AUTO: NORMAL — SIGNIFICANT CHANGE UP
RBC CASTS # UR COMP ASSIST: 3 /HPF — SIGNIFICANT CHANGE UP (ref 0–4)
S AUREUS DNA NOSE QL NAA+PROBE: SIGNIFICANT CHANGE UP
SAO2 % BLDV: 72.3 % — SIGNIFICANT CHANGE UP (ref 67–88)
SARS-COV-2 RNA SPEC QL NAA+PROBE: SIGNIFICANT CHANGE UP
SODIUM SERPL-SCNC: 137 MMOL/L — SIGNIFICANT CHANGE UP (ref 135–145)
SP GR SPEC: 1.04 — HIGH (ref 1.01–1.02)
UROBILINOGEN FLD QL: ABNORMAL
WBC # BLD: 18.7 K/UL — HIGH (ref 3.8–10.5)
WBC # FLD AUTO: 18.7 K/UL — HIGH (ref 3.8–10.5)
WBC UR QL: 1 /HPF — SIGNIFICANT CHANGE UP (ref 0–5)

## 2022-11-24 PROCEDURE — 71045 X-RAY EXAM CHEST 1 VIEW: CPT | Mod: 26

## 2022-11-24 PROCEDURE — 99285 EMERGENCY DEPT VISIT HI MDM: CPT

## 2022-11-24 PROCEDURE — 71275 CT ANGIOGRAPHY CHEST: CPT | Mod: 26,MA

## 2022-11-24 PROCEDURE — 93010 ELECTROCARDIOGRAM REPORT: CPT

## 2022-11-24 PROCEDURE — 99223 1ST HOSP IP/OBS HIGH 75: CPT | Mod: GC

## 2022-11-24 RX ORDER — RIVAROXABAN 15 MG-20MG
20 KIT ORAL DAILY
Refills: 0 | Status: DISCONTINUED | OUTPATIENT
Start: 2022-11-24 | End: 2022-11-25

## 2022-11-24 RX ORDER — VANCOMYCIN HCL 1 G
1500 VIAL (EA) INTRAVENOUS ONCE
Refills: 0 | Status: DISCONTINUED | OUTPATIENT
Start: 2022-11-24 | End: 2022-11-24

## 2022-11-24 RX ORDER — AZITHROMYCIN 500 MG/1
500 TABLET, FILM COATED ORAL EVERY 24 HOURS
Refills: 0 | Status: DISCONTINUED | OUTPATIENT
Start: 2022-11-25 | End: 2022-11-25

## 2022-11-24 RX ORDER — PANTOPRAZOLE SODIUM 20 MG/1
40 TABLET, DELAYED RELEASE ORAL
Refills: 0 | Status: DISCONTINUED | OUTPATIENT
Start: 2022-11-24 | End: 2022-11-25

## 2022-11-24 RX ORDER — IPRATROPIUM/ALBUTEROL SULFATE 18-103MCG
3 AEROSOL WITH ADAPTER (GRAM) INHALATION EVERY 8 HOURS
Refills: 0 | Status: DISCONTINUED | OUTPATIENT
Start: 2022-11-24 | End: 2022-11-25

## 2022-11-24 RX ORDER — ATORVASTATIN CALCIUM 80 MG/1
20 TABLET, FILM COATED ORAL AT BEDTIME
Refills: 0 | Status: DISCONTINUED | OUTPATIENT
Start: 2022-11-24 | End: 2022-11-25

## 2022-11-24 RX ORDER — CELECOXIB 200 MG/1
200 CAPSULE ORAL DAILY
Refills: 0 | Status: DISCONTINUED | OUTPATIENT
Start: 2022-11-24 | End: 2022-11-25

## 2022-11-24 RX ORDER — AZITHROMYCIN 500 MG/1
500 TABLET, FILM COATED ORAL EVERY 24 HOURS
Refills: 0 | Status: DISCONTINUED | OUTPATIENT
Start: 2022-11-24 | End: 2022-11-24

## 2022-11-24 RX ORDER — SODIUM CHLORIDE 9 MG/ML
1000 INJECTION INTRAMUSCULAR; INTRAVENOUS; SUBCUTANEOUS ONCE
Refills: 0 | Status: COMPLETED | OUTPATIENT
Start: 2022-11-24 | End: 2022-11-24

## 2022-11-24 RX ORDER — CEFTRIAXONE 500 MG/1
1000 INJECTION, POWDER, FOR SOLUTION INTRAMUSCULAR; INTRAVENOUS EVERY 24 HOURS
Refills: 0 | Status: DISCONTINUED | OUTPATIENT
Start: 2022-11-24 | End: 2022-11-25

## 2022-11-24 RX ORDER — SODIUM,POTASSIUM PHOSPHATES 278-250MG
1 POWDER IN PACKET (EA) ORAL ONCE
Refills: 0 | Status: COMPLETED | OUTPATIENT
Start: 2022-11-24 | End: 2022-11-24

## 2022-11-24 RX ORDER — SODIUM CHLORIDE 9 MG/ML
1000 INJECTION INTRAMUSCULAR; INTRAVENOUS; SUBCUTANEOUS
Refills: 0 | Status: DISCONTINUED | OUTPATIENT
Start: 2022-11-24 | End: 2022-11-25

## 2022-11-24 RX ORDER — PIPERACILLIN AND TAZOBACTAM 4; .5 G/20ML; G/20ML
3.38 INJECTION, POWDER, LYOPHILIZED, FOR SOLUTION INTRAVENOUS ONCE
Refills: 0 | Status: COMPLETED | OUTPATIENT
Start: 2022-11-24 | End: 2022-11-24

## 2022-11-24 RX ADMIN — SODIUM CHLORIDE 75 MILLILITER(S): 9 INJECTION INTRAMUSCULAR; INTRAVENOUS; SUBCUTANEOUS at 16:03

## 2022-11-24 RX ADMIN — AZITHROMYCIN 255 MILLIGRAM(S): 500 TABLET, FILM COATED ORAL at 16:03

## 2022-11-24 RX ADMIN — CEFTRIAXONE 100 MILLIGRAM(S): 500 INJECTION, POWDER, FOR SOLUTION INTRAMUSCULAR; INTRAVENOUS at 18:13

## 2022-11-24 RX ADMIN — Medication 3 MILLILITER(S): at 21:22

## 2022-11-24 RX ADMIN — PIPERACILLIN AND TAZOBACTAM 200 GRAM(S): 4; .5 INJECTION, POWDER, LYOPHILIZED, FOR SOLUTION INTRAVENOUS at 08:48

## 2022-11-24 RX ADMIN — Medication 1 TABLET(S): at 09:11

## 2022-11-24 RX ADMIN — SODIUM CHLORIDE 75 MILLILITER(S): 9 INJECTION INTRAMUSCULAR; INTRAVENOUS; SUBCUTANEOUS at 18:20

## 2022-11-24 RX ADMIN — ATORVASTATIN CALCIUM 20 MILLIGRAM(S): 80 TABLET, FILM COATED ORAL at 21:21

## 2022-11-24 RX ADMIN — SODIUM CHLORIDE 1000 MILLILITER(S): 9 INJECTION INTRAMUSCULAR; INTRAVENOUS; SUBCUTANEOUS at 08:12

## 2022-11-24 NOTE — ED ADULT NURSE NOTE - ED STAT RN HANDOFF DETAILS
Report given to receiving change of shift SIMON JALLOH patient is in no acute distress. Patient vital signs stable, plan of care explained.

## 2022-11-24 NOTE — ED ADULT NURSE NOTE - NURSING MUSC STRENGTH
Ears: no ear pain and no hearing problems.Nose: no nasal congestion and no nasal drainage.Mouth/Throat: no dysphagia, no hoarseness and no throat pain.Neck: no lumps, no pain, no stiffness and no swollen glands. hand grasp, leg strength strong and equal bilaterally

## 2022-11-24 NOTE — H&P ADULT - PROBLEM SELECTOR PLAN 6
- Continue home statin.   - Should probably be on high intensity statin. Will suggest outpatient followup.

## 2022-11-24 NOTE — ED ADULT NURSE NOTE - NSIMPLEMENTINTERV_GEN_ALL_ED
Implemented All Universal Safety Interventions:  [place] to call system. Call bell, personal items and telephone within reach. Instruct patient to call for assistance. Room bathroom lighting operational. Non-slip footwear when patient is off stretcher. Physically safe environment: no spills, clutter or unnecessary equipment. Stretcher in lowest position, wheels locked, appropriate side rails in place.

## 2022-11-24 NOTE — ED ADULT NURSE REASSESSMENT NOTE - NS ED NURSE REASSESS COMMENT FT1
Pt received from [facility] in [facility]. pt AOX4 breathing even unlabored spontaneously on 4L NC, denies SOB at present. pt had episode of chest pain prior to shift change, repeat EKG was completed and given to MD Cabrera, pt reports that chest pain has since resolved. NSR 80s on monitor at present. pt repositioned to position of comfort, updated on plan of care, pt aware he is pending CT angio chest.

## 2022-11-24 NOTE — H&P ADULT - NSHPLABSRESULTS_GEN_ALL_CORE
LABS:                         14.1   18.70 )-----------( 249      ( 2022 08:23 )             43.6     11-    137  |  102  |  18  ----------------------------<  116<H>  4.2   |  23  |  0.90    Ca    9.3      2022 08:23  Phos  1.6       Mg     1.6         TPro  7.5  /  Alb  4.2  /  TBili  0.5  /  DBili  x   /  AST  23  /  ALT  46<H>  /  AlkPhos  90      PT/INR - ( 2022 08:23 )   PT: 20.6 sec;   INR: 1.78 ratio         PTT - ( 2022 08:23 )  PTT:34.2 sec  Urinalysis Basic - ( 2022 09:34 )    Color: Dark Yellow / Appearance: Clear / S.042 / pH: x  Gluc: x / Ketone: Trace  / Bili: Small / Urobili: 2 mg/dL   Blood: x / Protein: 30 mg/dL / Nitrite: Negative   Leuk Esterase: Negative / RBC: 3 /hpf / WBC 1 /HPF   Sq Epi: x / Non Sq Epi: 1 /hpf / Bacteria: Negative        RADIOLOGY, EKG & ADDITIONAL TESTS:  EKG: NSR  < from: CT Angio Chest PE Protocol w/ IV Cont (22 @ 12:23) >    PULMONARY ANGIOGRAM: No pulmonary embolism in the main, lobar, segmental   or subsegmental pulmonary arteries.    LYMPH NODES: No lymphadenopathy.    HEART/VASCULATURE: Heart size within normal limits. No pericardial   effusion. Calcifications of the coronary arteries and aorta.    AIRWAYS/LUNGS/PLEURA: Emphysema. Groundglass opacities within the left   upper and lower lobe. Addition, there are likely subtle groundglass   opacities in the right middle lobe. Linear atelectasis in the right lower   lobe. No pleural effusion.    UPPER ABDOMEN: Hepatic steatosis.    BONES/SOFT TISSUES: Degenerative changes of the spine. No aggressive   osseous lesion.    < end of copied text >

## 2022-11-24 NOTE — H&P ADULT - PROBLEM SELECTOR PLAN 1
- Initial vitals tachycardic, febrile at home, increased O2 requirements  - F/u blood and urine cultures - Initial vitals tachycardic, febrile at home, increased O2 requirements  - Elevated procal.  - F/u blood and urine cultures

## 2022-11-24 NOTE — H&P ADULT - HISTORY OF PRESENT ILLNESS
The patient is a 63 YO M with a history of COPD (35 pack year smoking, quit in august), DVT on xarelto, recent COVID pneumonia last month s/p MAB infusion, HLD, GERD who presents with chills, malaise, and SOB that started last night. Yesterday he felt fine and spent the day on his boat fishing. Last night at around 2 am he felt chills and needed 4 blankets to stay warm. He measured his temperature with an infrared thermometer and it showed 102F, so he took tylenol and came to the hospital. He denies any chest pain, nausea, vomiting, constipation, diarrhea, or lower leg swelling. His baseline O2 sat is 92% on RA.     ED Course: Vitals on presentation were T 99.5. . 137/90 89% on RA. Placed on 4 L NC. Given 1L IVF. S/p zosyn. CT angio PE was negative for PE. Notable for left sided GGO's. RVP was negative. Given the hypoxemia, elevated HR, the patient was admitted to medicine for further management.    On exam the patient is comfortable, feeling much better than when he arrived, though still with some malaise. He is happy to report that he stopped smoking 4 months ago. Is not using anything to help him quit. Vitals during my exam were saturation of 97% on 3LNC, with HR 90's

## 2022-11-24 NOTE — ED PROVIDER NOTE - PHYSICAL EXAMINATION
General: appears short of breath, O2 90% on 2L NC   HEENT: atraumatic, normocephalic; pupils are equal, round and react to light, extraocular movements intact bilaterally without deficits, no conjunctival pallor, mucous membranes moist  Neck: no jugular venous distension, full range of motion  Chest/Lung: clear to auscultation bilaterally, no wheezes/rhonchi/rales  Heart: regular rate and rhythm, no murmur/gallops/rubs  Abdomen: normal bowel sounds, soft, non-tender, non-distended  Extremities: no lower extremity edema, +2 radial pulses bilaterally, +2 dorsalis pedis pulses bilaterally  Musculoskeletal: full range of motion of all 4 extremities  Nervous System: alert and oriented, no motor deficits or sensory deficits; CNII-XII grossly intact; no focal neurologic deficits  Skin: no rashes/lacerations noted

## 2022-11-24 NOTE — ED PROVIDER NOTE - CLINICAL SUMMARY MEDICAL DECISION MAKING FREE TEXT BOX
Patient is a 62 year-old-male with history of HTN, HLD, COPD (home O2 PRN), and DVT on Xarelto presents with fever and hypoxia. Vital significant for oral T of 99.5F and HR of 138, qualifying for SIRS criteria. Will obtain sepsis workup. Also will obtain CTA chest to r/o PE given new hypoxia. Anticipate admission for sepsis and hypoxia.

## 2022-11-24 NOTE — H&P ADULT - NSHPPHYSICALEXAM_GEN_ALL_CORE
VITALS:   T(C): 36.7 (11-24-22 @ 14:04), Max: 37.6 (11-24-22 @ 07:54)  HR: 90 (11-24-22 @ 14:04) (88 - 138)  BP: 145/70 (11-24-22 @ 14:04) (106/67 - 145/70)  RR: 18 (11-24-22 @ 14:04) (18 - 24)  SpO2: 96% (11-24-22 @ 14:04) (89% - 96%)    GENERAL: NAD, sitting in chair watching football. 3L NC  HEAD:  Atraumatic, Normocephalic  EYES: EOMI, PERRLA, conjunctiva and sclera clear  ENT: Moist mucous membranes  NECK: Supple, No JVD  CHEST/LUNG: Clear to auscultation bilaterally; No wheezing or rhonchi  HEART: Regular rate and rhythm; No murmurs.  Peripheral edema: trace  ABDOMEN: BSx4; Soft, nontender, nondistended  EXTREMITIES:  2+ radial pulses  NERVOUS SYSTEM:  A&Ox3, no gross lateralizing deficits   SKIN: Many tattoos B/L upper extremities

## 2022-11-24 NOTE — H&P ADULT - PROBLEM SELECTOR PLAN 3
- Not in acute exacerbation at this time but it is likely contributing to his presentation. 35 pack year smoking history, has not smoked in 4 months.   - Baseline O2 sat 92% on RA  - Using trelegy at home. Albuterol rescue inhaler he rarely uses.  - Can bring in home trelegy  - Duonebs q6 for now  - Wean O2 as tolerated

## 2022-11-24 NOTE — ED ADULT NURSE NOTE - OBJECTIVE STATEMENT
61yo M aaox4 h/o COPD, emphysema on 2 lts O2 PRN, DVT on Xarelto, presents to Ed from home c/o fever, lightheaded, as per pt around 2am pt reports that he not feeling well, generalized body aches , temperature 102, took tylenol with slightly improved , upon arrived on triage SPO2 was 89% RA, Pt denies CP,, HA, vision changes, n/v/d, abdominal pain ,  symptoms.  Safety and comfort measures initiated- bed placed in lowest position and side rails raised. Pt oriented to call bell system.  .

## 2022-11-24 NOTE — H&P ADULT - NSHPREVIEWOFSYSTEMS_GEN_ALL_CORE
REVIEW OF SYSTEMS:  CONSTITUTIONAL: +weakness +fevers +chills  EYES/ENT: No visual changes;  No vertigo or throat pain   NECK: No pain or stiffness  RESPIRATORY: + SOB, no cough  CARDIOVASCULAR: No chest pain or palpitations  GASTROINTESTINAL: No abdominal or epigastric pain. No nausea, vomiting, or hematemesis; No diarrhea or constipation. No melena or hematochezia.  GENITOURINARY: No dysuria, frequency or hematuria  NEUROLOGICAL: No numbness or weakness  SKIN: No itching, rashes

## 2022-11-24 NOTE — H&P ADULT - PROBLEM SELECTOR PLAN 4
- 2015 after meniscus surgery.  - Well followed by hematology outpatient. Positive for a lupus related antibody.   - Continue home xarelto

## 2022-11-24 NOTE — H&P ADULT - PROBLEM SELECTOR PLAN 5
- Knees b/l  - Steroid injection 11/5  - Continue meloxicam - Knees b/l  - Steroid injection 11/5  - Celecoxib therapeutic interchange for meloxicam

## 2022-11-24 NOTE — ED PROVIDER NOTE - IV ALTEPLASE EXCL ABS HIDDEN
I spoke with patient, reviewed results  Scheduled for DME set up 10/28/19 at 0930 in [facility]  Already has compliance follow up scheduled for 12/20/19 at 1015 with Dr Melchor Valdes      Paperwork to Malia Chambers show

## 2022-11-24 NOTE — H&P ADULT - PROBLEM SELECTOR PLAN 2
- CTA PE with left upper and lower lobe ground glass opacities. Emphysema. RVP negative  - COVID pneumonia in october s/p MAB. Very likely the CT findings are a sequelae of that and not an active process  - Might still have a superimposed process  - s/p zosyn in the ED.   - Will start ceftriaxone and azithromycin.  - F/u MRSA PCR, urine strep and legionella, procal, blood and urine cultures 11/24 - CTA PE with left upper and lower lobe ground glass opacities. Emphysema. RVP negative  - COVID pneumonia in october s/p MAB. Very likely the CT findings are a sequelae of that and not an active process  - Might still have a superimposed process. Elevated procal  - s/p zosyn in the ED.   - Will start ceftriaxone and azithromycin.  - F/u MRSA PCR, urine strep and legionella, blood and urine cultures 11/24

## 2022-11-24 NOTE — ED PROVIDER NOTE - ATTENDING CONTRIBUTION TO CARE
Patient is a 63 yo M with history of HTN, hyperlipidemia, history of COPD, history of DVT on xarelto here for fever, chills, shortness of breath. Wife is present with patient. Patient reports severe chills and myalgias around 2 AM. He initially had a temp of 99 but then developed a fever to 102 this morning. He took tylenol. Wife states that patient has an oxygen concentrator, uses it on occasion. She states he is short of breath around 89% at baseline. Patient denies chest pain, pleuritic pain. No nausea, vomiting, diarrhea. No urinary symptoms. He had COVID in October 2022, received MAB infusion at that time. Denies missing any doses of xarelto. Per wife, patient has some genetic marker for Lupus that they were told may increase his risk of blood clots. He had a clot in his RLE after having knee surgery in his LLE and were evaluated by hematology at that time.     VS noted  Gen. no acute distress, Non toxic   HEENT: EOMI, mmm  Lungs: CTAB/L no C/ W /R   CVS: RRR   Abd; Soft non tender, non distended   Ext: no edema, no calf tenderness  Skin: no rash  Neuro AAOx3 non focal clear speech  a/p: fevers/ rigors - concern for infectious etiology. Plan for sepsis work up. Will get CTA Chest to evaluate for PE/ pneumonia.   - Deborah THOMAS

## 2022-11-24 NOTE — ED PROVIDER NOTE - OBJECTIVE STATEMENT
Patient is a 62 year-old-male with history of HTN, HLD, COPD (home O2 PRN), and DVT on Xarelto presents with fever. Patient reports that around 2am, he started feeling unwell and overall body aches. Had a T of 102F at home which improved with some tylenol. Denies chest pain, difficulty breathing, vomiting, abdominal pain, urinary/bowel complaints. Noted to be hypoxia to 89% on room air in triage. Patient is a 62 year-old-male with history of HTN, HLD, COPD (home O2 PRN), and DVT on Xarelto presents with fever. Patient reports that around 2am, he started feeling unwell and overall body aches. Had a T of 102F at home and +rigors which improved with some tylenol at 5am. Denies chest pain, difficulty breathing, vomiting, abdominal pain, urinary/bowel complaints. Noted to be hypoxia to 89% on room air in triage.

## 2022-11-24 NOTE — H&P ADULT - ATTENDING COMMENTS
63 y/o M w/ PMHx of COPD (O2 prn), HLD, GERD, hx of DVT on Xarelto, recent COVID PNA (10/2022) presents with 1 day hx of feeling “unwell” fevers (TMax 102F), chills, rigors, generalized weakness, and SOB. No CP, abd pain, diarrhea, dysuria. No sick contacts. In ED, low grade temps, tachycardic to 130s, tachypneic, 4L NC with satts in the mid 90s. WBC 18. RVP negative. CTA: No PE. Left upper and lower lobe ground glass opacities. Can be 2/2 atypical viral infection. follow-up is suggested in 8-12 weeks for   resolution/change.    #Sepsis   #PNA  Febrile, tachycardic, leukocytosis, CTA neg PE but notable for left upper and lower lobe ground glass opacities. RVP negative. Recent COVID PNA infection. No sputum production, abd pain, diarrhea, dysuria.   -S/p zosyn and vanco, IVF. Start ceftriaxone and azithro. F/u MRSA PCR, urine strep and legionella, procal, BCx.   -Wean oxygen as tolerated    #Sinus tachycardia  Likely in setting of sepsis. Now HR ~80-90s. EKG NSR w/o acute ischemic changes. Recent TTE 8/2022: EF 55-60%, grade I diastolic dysfunction, trace MR, sclerotic abortive valve with normal opening. Recent stress this year that was reportedly unremarkable.     #COPD  Does not appear to be in an acute COPDex at this time. On oxygen prn at home.   -Wean oxygen; titrate O2 >88%.   -c/w Spiriva, duonebs/albuterol nebs prn     #DVT  Hx of DVT ~2015. Follows with hematology. C/w Xarelto for now     #Osteoarthritis   Recent R knee plain films with osteoarthritis. Follows with ortho. s/p steroid infection 11/5. Says pain/discomfort is unchanged. No difficulties with ambulation. If worsening, would repeat imaging.     #GERD - c/w PPI    #HLD - c/w statin 63 y/o M w/ PMHx of COPD (O2 prn), HLD, GERD, hx of DVT on Xarelto, recent COVID PNA (10/2022) presents with 1 day hx of feeling “unwell” fevers (TMax 102F), chills, rigors, generalized weakness, and SOB. No CP, abd pain, diarrhea, dysuria. No sick contacts. In ED, low grade temps, tachycardic to 130s, tachypneic, 4L NC with satts in the mid 90s. WBC 18. RVP negative. CTA: No PE. Left upper and lower lobe ground glass opacities. Can be 2/2 atypical viral infection. follow-up is suggested in 8-12 weeks for   resolution/change.    #Sepsis   #PNA  Febrile, tachycardic, leukocytosis, CTA neg PE but notable for left upper and lower lobe ground glass opacities. RVP negative. Recent COVID PNA infection. No sputum production, abd pain, diarrhea, dysuria.   -Monitor fever curve; trend WBC  -S/p zosyn and vanco, IVF. Start ceftriaxone and azithro. F/u MRSA PCR, urine strep and legionella, procal, BCx.   -Wean oxygen as tolerated    #Sinus tachycardia  Likely in setting of sepsis. Now HR ~80-90s. EKG NSR w/o acute ischemic changes. Recent TTE 8/2022: EF 55-60%, grade I diastolic dysfunction, trace MR, sclerotic abortive valve with normal opening. Recent stress this year that was reportedly unremarkable.     #COPD  Does not appear to be in an acute COPDex at this time. On oxygen prn at home.   -Wean oxygen; titrate O2 >88%.   -c/w Spiriva, duonebs/albuterol nebs prn     #DVT  Hx of DVT ~2015. Follows with hematology. C/w Xarelto for now     #Osteoarthritis   Recent R knee plain films with osteoarthritis. Follows with ortho. s/p steroid infection 11/5. Says pain/discomfort is unchanged. No difficulties with ambulation. If worsening, would repeat imaging.     #GERD - c/w PPI    #HLD - c/w statin 61 y/o M w/ PMHx of COPD (O2 prn), HLD, GERD, hx of DVT on Xarelto, recent COVID PNA (10/2022) presents with 1 day hx of feeling “unwell” fevers (TMax 102F), chills, rigors, generalized weakness, and SOB. No CP, abd pain, diarrhea, dysuria. No sick contacts. In ED, low grade temps, tachycardic to 130s, tachypneic, 4L NC with satts in the mid 90s. WBC 18. RVP negative. CTA: No PE. Left upper and lower lobe ground glass opacities. Can be 2/2 atypical viral infection. follow-up is suggested in 8-12 weeks for   resolution/change.    #Sepsis   #PNA  Febrile, tachycardic, leukocytosis, CTA neg PE but notable for left upper and lower lobe ground glass opacities although possible 2/2 recent COVID PNA. RVP negative. No sputum production, abd pain, diarrhea, dysuria.   -Monitor fever curve; trend WBC  -S/p zosyn and vanco, IVF. Start ceftriaxone and azithro. F/u MRSA PCR, urine strep and legionella, procal, BCx.   -Wean oxygen as tolerated    #Sinus tachycardia  Likely in setting of sepsis. Now HR ~80-90s. EKG NSR w/o acute ischemic changes. Recent TTE 8/2022: EF 55-60%, grade I diastolic dysfunction, trace MR, sclerotic abortive valve with normal opening. Recent stress this year that was reportedly unremarkable.     #COPD  Does not appear to be in an acute COPDex at this time. On oxygen prn at home.   -Wean oxygen; titrate O2 >88%.   -c/w trelegy, duonebs/albuterol nebs prn     #DVT  Hx of DVT ~2015. Follows with hematology. C/w Xarelto for now     #Osteoarthritis   Recent R knee plain films with osteoarthritis. Follows with ortho. s/p steroid infection 11/5. Says pain/discomfort is unchanged. No difficulties with ambulation. If worsening, would repeat imaging.     #GERD - c/w PPI    #HLD - c/w statin

## 2022-11-24 NOTE — ED PROVIDER NOTE - PROGRESS NOTE DETAILS
Prabhjot PGY2  Phosphorus level 1.6, will replete. Prabhjot PGY2  Discussed with Dr. Barry - will admit to hospitalist for sepsis. Deborah THOMAS: Patient c/o chest tightness - repeat ekg done and no significant change. Pending CTA Chest. Prabhjot PGY2   CTA chest did not show PE but showed left upper and lower lobe ground glass opacities.   Paged hospitalist for admission of sepsis and hypoxia. Rick PGY2   CTA chest did not show PE but showed left upper and lower lobe ground glass opacities.   Admitted to hospitalist for admission of sepsis and hypoxia.

## 2022-11-25 ENCOUNTER — NON-APPOINTMENT (OUTPATIENT)
Age: 62
End: 2022-11-25

## 2022-11-25 ENCOUNTER — TRANSCRIPTION ENCOUNTER (OUTPATIENT)
Age: 62
End: 2022-11-25

## 2022-11-25 VITALS
TEMPERATURE: 98 F | RESPIRATION RATE: 19 BRPM | SYSTOLIC BLOOD PRESSURE: 127 MMHG | HEART RATE: 91 BPM | OXYGEN SATURATION: 92 % | DIASTOLIC BLOOD PRESSURE: 76 MMHG

## 2022-11-25 LAB
ALBUMIN SERPL ELPH-MCNC: 3.8 G/DL — SIGNIFICANT CHANGE UP (ref 3.3–5)
ALP SERPL-CCNC: 77 U/L — SIGNIFICANT CHANGE UP (ref 40–120)
ALT FLD-CCNC: 33 U/L — SIGNIFICANT CHANGE UP (ref 10–45)
ANION GAP SERPL CALC-SCNC: 11 MMOL/L — SIGNIFICANT CHANGE UP (ref 5–17)
AST SERPL-CCNC: 22 U/L — SIGNIFICANT CHANGE UP (ref 10–40)
BASOPHILS # BLD AUTO: 0.04 K/UL — SIGNIFICANT CHANGE UP (ref 0–0.2)
BASOPHILS NFR BLD AUTO: 0.2 % — SIGNIFICANT CHANGE UP (ref 0–2)
BILIRUB SERPL-MCNC: 0.4 MG/DL — SIGNIFICANT CHANGE UP (ref 0.2–1.2)
BUN SERPL-MCNC: 14 MG/DL — SIGNIFICANT CHANGE UP (ref 7–23)
CALCIUM SERPL-MCNC: 8.7 MG/DL — SIGNIFICANT CHANGE UP (ref 8.4–10.5)
CHLORIDE SERPL-SCNC: 104 MMOL/L — SIGNIFICANT CHANGE UP (ref 96–108)
CO2 SERPL-SCNC: 23 MMOL/L — SIGNIFICANT CHANGE UP (ref 22–31)
CREAT SERPL-MCNC: 0.81 MG/DL — SIGNIFICANT CHANGE UP (ref 0.5–1.3)
CULTURE RESULTS: NO GROWTH — SIGNIFICANT CHANGE UP
EGFR: 99 ML/MIN/1.73M2 — SIGNIFICANT CHANGE UP
EOSINOPHIL # BLD AUTO: 0.27 K/UL — SIGNIFICANT CHANGE UP (ref 0–0.5)
EOSINOPHIL NFR BLD AUTO: 1.5 % — SIGNIFICANT CHANGE UP (ref 0–6)
GLUCOSE SERPL-MCNC: 122 MG/DL — HIGH (ref 70–99)
HCT VFR BLD CALC: 39.8 % — SIGNIFICANT CHANGE UP (ref 39–50)
HGB BLD-MCNC: 12.6 G/DL — LOW (ref 13–17)
IMM GRANULOCYTES NFR BLD AUTO: 0.6 % — SIGNIFICANT CHANGE UP (ref 0–0.9)
LEGIONELLA AG UR QL: NEGATIVE — SIGNIFICANT CHANGE UP
LYMPHOCYTES # BLD AUTO: 11.6 % — LOW (ref 13–44)
LYMPHOCYTES # BLD AUTO: 2.05 K/UL — SIGNIFICANT CHANGE UP (ref 1–3.3)
MAGNESIUM SERPL-MCNC: 1.8 MG/DL — SIGNIFICANT CHANGE UP (ref 1.6–2.6)
MCHC RBC-ENTMCNC: 30.3 PG — SIGNIFICANT CHANGE UP (ref 27–34)
MCHC RBC-ENTMCNC: 31.7 GM/DL — LOW (ref 32–36)
MCV RBC AUTO: 95.7 FL — SIGNIFICANT CHANGE UP (ref 80–100)
MONOCYTES # BLD AUTO: 1.31 K/UL — HIGH (ref 0–0.9)
MONOCYTES NFR BLD AUTO: 7.4 % — SIGNIFICANT CHANGE UP (ref 2–14)
NEUTROPHILS # BLD AUTO: 13.82 K/UL — HIGH (ref 1.8–7.4)
NEUTROPHILS NFR BLD AUTO: 78.7 % — HIGH (ref 43–77)
NRBC # BLD: 0 /100 WBCS — SIGNIFICANT CHANGE UP (ref 0–0)
PHOSPHATE SERPL-MCNC: 3 MG/DL — SIGNIFICANT CHANGE UP (ref 2.5–4.5)
PLATELET # BLD AUTO: 209 K/UL — SIGNIFICANT CHANGE UP (ref 150–400)
POTASSIUM SERPL-MCNC: 4.5 MMOL/L — SIGNIFICANT CHANGE UP (ref 3.5–5.3)
POTASSIUM SERPL-SCNC: 4.5 MMOL/L — SIGNIFICANT CHANGE UP (ref 3.5–5.3)
PROT SERPL-MCNC: 7.1 G/DL — SIGNIFICANT CHANGE UP (ref 6–8.3)
RBC # BLD: 4.16 M/UL — LOW (ref 4.2–5.8)
RBC # FLD: 14 % — SIGNIFICANT CHANGE UP (ref 10.3–14.5)
S PNEUM AG UR QL: NEGATIVE — SIGNIFICANT CHANGE UP
SODIUM SERPL-SCNC: 138 MMOL/L — SIGNIFICANT CHANGE UP (ref 135–145)
SPECIMEN SOURCE: SIGNIFICANT CHANGE UP
WBC # BLD: 17.6 K/UL — HIGH (ref 3.8–10.5)
WBC # FLD AUTO: 17.6 K/UL — HIGH (ref 3.8–10.5)

## 2022-11-25 PROCEDURE — 82947 ASSAY GLUCOSE BLOOD QUANT: CPT

## 2022-11-25 PROCEDURE — 83735 ASSAY OF MAGNESIUM: CPT

## 2022-11-25 PROCEDURE — 82330 ASSAY OF CALCIUM: CPT

## 2022-11-25 PROCEDURE — 82803 BLOOD GASES ANY COMBINATION: CPT

## 2022-11-25 PROCEDURE — 87449 NOS EACH ORGANISM AG IA: CPT

## 2022-11-25 PROCEDURE — 87899 AGENT NOS ASSAY W/OPTIC: CPT

## 2022-11-25 PROCEDURE — 71275 CT ANGIOGRAPHY CHEST: CPT | Mod: MA

## 2022-11-25 PROCEDURE — 82565 ASSAY OF CREATININE: CPT

## 2022-11-25 PROCEDURE — 93005 ELECTROCARDIOGRAM TRACING: CPT

## 2022-11-25 PROCEDURE — 85018 HEMOGLOBIN: CPT

## 2022-11-25 PROCEDURE — 36415 COLL VENOUS BLD VENIPUNCTURE: CPT

## 2022-11-25 PROCEDURE — 87086 URINE CULTURE/COLONY COUNT: CPT

## 2022-11-25 PROCEDURE — 0225U NFCT DS DNA&RNA 21 SARSCOV2: CPT

## 2022-11-25 PROCEDURE — 87040 BLOOD CULTURE FOR BACTERIA: CPT

## 2022-11-25 PROCEDURE — 85014 HEMATOCRIT: CPT

## 2022-11-25 PROCEDURE — 85610 PROTHROMBIN TIME: CPT

## 2022-11-25 PROCEDURE — 84145 PROCALCITONIN (PCT): CPT

## 2022-11-25 PROCEDURE — 84295 ASSAY OF SERUM SODIUM: CPT

## 2022-11-25 PROCEDURE — 81001 URINALYSIS AUTO W/SCOPE: CPT

## 2022-11-25 PROCEDURE — 84132 ASSAY OF SERUM POTASSIUM: CPT

## 2022-11-25 PROCEDURE — 94640 AIRWAY INHALATION TREATMENT: CPT

## 2022-11-25 PROCEDURE — 99239 HOSP IP/OBS DSCHRG MGMT >30: CPT | Mod: GC

## 2022-11-25 PROCEDURE — 87641 MR-STAPH DNA AMP PROBE: CPT

## 2022-11-25 PROCEDURE — 85025 COMPLETE CBC W/AUTO DIFF WBC: CPT

## 2022-11-25 PROCEDURE — 84100 ASSAY OF PHOSPHORUS: CPT

## 2022-11-25 PROCEDURE — 80053 COMPREHEN METABOLIC PANEL: CPT

## 2022-11-25 PROCEDURE — 99285 EMERGENCY DEPT VISIT HI MDM: CPT

## 2022-11-25 PROCEDURE — 82435 ASSAY OF BLOOD CHLORIDE: CPT

## 2022-11-25 PROCEDURE — 87640 STAPH A DNA AMP PROBE: CPT

## 2022-11-25 PROCEDURE — 85730 THROMBOPLASTIN TIME PARTIAL: CPT

## 2022-11-25 PROCEDURE — 83605 ASSAY OF LACTIC ACID: CPT

## 2022-11-25 PROCEDURE — 71045 X-RAY EXAM CHEST 1 VIEW: CPT

## 2022-11-25 RX ORDER — AZITHROMYCIN 500 MG/1
1 TABLET, FILM COATED ORAL
Qty: 3 | Refills: 0
Start: 2022-11-25 | End: 2022-11-27

## 2022-11-25 RX ORDER — CEPHALEXIN 500 MG
1 CAPSULE ORAL
Qty: 16 | Refills: 0
Start: 2022-11-25 | End: 2022-12-02

## 2022-11-25 RX ORDER — CEPHALEXIN 500 MG
1 CAPSULE ORAL
Qty: 10 | Refills: 0
Start: 2022-11-25 | End: 2022-11-29

## 2022-11-25 RX ADMIN — AZITHROMYCIN 255 MILLIGRAM(S): 500 TABLET, FILM COATED ORAL at 05:09

## 2022-11-25 RX ADMIN — RIVAROXABAN 20 MILLIGRAM(S): KIT at 13:16

## 2022-11-25 RX ADMIN — Medication 3 MILLILITER(S): at 13:16

## 2022-11-25 RX ADMIN — PANTOPRAZOLE SODIUM 40 MILLIGRAM(S): 20 TABLET, DELAYED RELEASE ORAL at 05:10

## 2022-11-25 RX ADMIN — CELECOXIB 200 MILLIGRAM(S): 200 CAPSULE ORAL at 13:15

## 2022-11-25 RX ADMIN — CEFTRIAXONE 100 MILLIGRAM(S): 500 INJECTION, POWDER, FOR SOLUTION INTRAMUSCULAR; INTRAVENOUS at 15:41

## 2022-11-25 RX ADMIN — Medication 3 MILLILITER(S): at 05:10

## 2022-11-25 NOTE — DISCHARGE NOTE NURSING/CASE MANAGEMENT/SOCIAL WORK - PATIENT PORTAL LINK FT
You can access the FollowMyHealth Patient Portal offered by [facility] by registering at the following website: [email or web address]. By joining [facility]’s FollowMyHealth portal, you will also be able to view your health information using other applications (apps) compatible with our system.

## 2022-11-25 NOTE — DISCHARGE NOTE PROVIDER - NSDCMRMEDTOKEN_GEN_ALL_CORE_FT
Albuterol (Eqv-ProAir HFA) 90 mcg/inh inhalation aerosol: 2 puff(s) inhaled every 6 hours   albuterol 90 mcg/inh inhalation aerosol: 2 puff(s) inhaled every 6 hours, As needed, Shortness of Breath and/or Wheezing  atorvastatin 20 mg oral tablet: 1 tab(s) orally once a day (at bedtime)  meloxicam 15 mg oral tablet: 1 tab(s) orally once a day  PriLOSEC 40 mg oral delayed release capsule: 1 cap(s) orally once a day  Trelegy Ellipta 200 mcg-62.5 mcg-25 mcg/inh inhalation powder: 1 puff(s) inhaled once a day  Xarelto 20 mg oral tablet: 1 tab(s) orally once a day (in the evening)   albuterol 90 mcg/inh inhalation aerosol: 2 puff(s) inhaled every 6 hours, As needed, Shortness of Breath and/or Wheezing  atorvastatin 20 mg oral tablet: 1 tab(s) orally once a day (at bedtime)  meloxicam 15 mg oral tablet: 1 tab(s) orally once a day  PriLOSEC 40 mg oral delayed release capsule: 1 cap(s) orally once a day  Trelegy Ellipta 200 mcg-62.5 mcg-25 mcg/inh inhalation powder: 1 puff(s) inhaled once a day  Xarelto 20 mg oral tablet: 1 tab(s) orally once a day (in the evening)   albuterol 90 mcg/inh inhalation aerosol: 2 puff(s) inhaled every 6 hours, As needed, Shortness of Breath and/or Wheezing  atorvastatin 20 mg oral tablet: 1 tab(s) orally once a day (at bedtime)  Azithromycin 3 Day Dose Pack 500 mg oral tablet: 1 tab(s) orally once a day   cephalexin 750 mg oral capsule: 1 cap(s) orally 2 times a day   meloxicam 15 mg oral tablet: 1 tab(s) orally once a day  PriLOSEC 40 mg oral delayed release capsule: 1 cap(s) orally once a day  Trelegy Ellipta 200 mcg-62.5 mcg-25 mcg/inh inhalation powder: 1 puff(s) inhaled once a day  Xarelto 20 mg oral tablet: 1 tab(s) orally once a day (in the evening)

## 2022-11-25 NOTE — PROGRESS NOTE ADULT - SUBJECTIVE AND OBJECTIVE BOX
PROGRESS NOTE:   Authored by: Chandler Beltrán M.D.   Internal Medicine PGY-1  Please Contact Via Teams    Patient is a 62y old  Male who presents with a chief complaint of Sepsis (2022 15:16)      SUBJECTIVE / OVERNIGHT EVENTS:  No acute events overnight.     ADDITIONAL REVIEW OF SYSTEMS:  Patient denies fevers, chills, chest pain, shortness of breath, nausea, abdominal pain, diarrhea, constipation, dysuria, leg swelling, headache, light headedness.    MEDICATIONS  (STANDING):  albuterol/ipratropium for Nebulization 3 milliLiter(s) Nebulizer every 8 hours  atorvastatin 20 milliGRAM(s) Oral at bedtime  azithromycin  IVPB 500 milliGRAM(s) IV Intermittent every 24 hours  cefTRIAXone   IVPB 1000 milliGRAM(s) IV Intermittent every 24 hours  celecoxib 200 milliGRAM(s) Oral daily  pantoprazole    Tablet 40 milliGRAM(s) Oral before breakfast  rivaroxaban 20 milliGRAM(s) Oral daily  sodium chloride 0.9%. 1000 milliLiter(s) (75 mL/Hr) IV Continuous <Continuous>    MEDICATIONS  (PRN):      CAPILLARY BLOOD GLUCOSE        I&O's Summary      PHYSICAL EXAM:  Vital Signs Last 24 Hrs  T(C): 36.6 (2022 05:53), Max: 37.6 (2022 07:54)  T(F): 97.8 (2022 05:53), Max: 99.6 (2022 07:54)  HR: 82 (2022 05:53) (82 - 122)  BP: 115/78 (2022 05:53) (106/67 - 145/70)  BP(mean): --  RR: 19 (2022 05:53) (18 - 22)  SpO2: 93% (2022 05:53) (93% - 96%)    Parameters below as of 2022 17:35  Patient On (Oxygen Delivery Method): nasal cannula  O2 Flow (L/min): 4      CONSTITUTIONAL: NAD, well-developed  RESPIRATORY: Normal respiratory effort; lungs are clear to auscultation bilaterally  CARDIOVASCULAR: Regular rate and rhythm, normal S1 and S2, no murmur/rub/gallop; No lower extremity edema; Peripheral pulses are 2+ bilaterally  ABDOMEN: Nontender to palpation, normoactive bowel sounds, no rebound/guarding; No hepatosplenomegaly  MUSCLOSKELETAL: no clubbing or cyanosis of digits; no joint swelling or tenderness to palpation  PSYCH: A+O to person, place, and time; affect appropriate    LABS:                        12.6   17.60 )-----------( 209      ( 2022 06:43 )             39.8         138  |  104  |  14  ----------------------------<  122<H>  4.5   |  23  |  0.81    Ca    8.7      2022 06:36  Phos  3.0       Mg     1.8         TPro  7.1  /  Alb  3.8  /  TBili  0.4  /  DBili  x   /  AST  22  /  ALT  33  /  AlkPhos  77      PT/INR - ( 2022 08:23 )   PT: 20.6 sec;   INR: 1.78 ratio         PTT - ( 2022 08:23 )  PTT:34.2 sec      Urinalysis Basic - ( 2022 09:34 )    Color: Dark Yellow / Appearance: Clear / S.042 / pH: x  Gluc: x / Ketone: Trace  / Bili: Small / Urobili: 2 mg/dL   Blood: x / Protein: 30 mg/dL / Nitrite: Negative   Leuk Esterase: Negative / RBC: 3 /hpf / WBC 1 /HPF   Sq Epi: x / Non Sq Epi: 1 /hpf / Bacteria: Negative          Tele Reviewed:    RADIOLOGY & ADDITIONAL TESTS:  Results Reviewed:   Imaging Personally Reviewed:  Electrocardiogram Personally Reviewed:     PROGRESS NOTE:   Authored by: Chandler Beltrán M.D.   Internal Medicine PGY-1  Please Contact Via Teams    Patient is a 62y old  Male who presents with a chief complaint of Sepsis (2022 15:16)      SUBJECTIVE / OVERNIGHT EVENTS:  No acute events overnight. Patient doing well this morning. I turned off his O2 and he was saturating 97%. Wife to bring in trelegy today.    MEDICATIONS  (STANDING):  albuterol/ipratropium for Nebulization 3 milliLiter(s) Nebulizer every 8 hours  atorvastatin 20 milliGRAM(s) Oral at bedtime  azithromycin  IVPB 500 milliGRAM(s) IV Intermittent every 24 hours  cefTRIAXone   IVPB 1000 milliGRAM(s) IV Intermittent every 24 hours  celecoxib 200 milliGRAM(s) Oral daily  pantoprazole    Tablet 40 milliGRAM(s) Oral before breakfast  rivaroxaban 20 milliGRAM(s) Oral daily  sodium chloride 0.9%. 1000 milliLiter(s) (75 mL/Hr) IV Continuous <Continuous>    MEDICATIONS  (PRN):      CAPILLARY BLOOD GLUCOSE        I&O's Summary      PHYSICAL EXAM:  Vital Signs Last 24 Hrs  T(C): 36.6 (2022 05:53), Max: 37.6 (2022 07:54)  T(F): 97.8 (2022 05:53), Max: 99.6 (2022 07:54)  HR: 82 (2022 05:53) (82 - 122)  BP: 115/78 (2022 05:53) (106/67 - 145/70)  BP(mean): --  RR: 19 (2022 05:53) (18 - 22)  SpO2: 93% (2022 05:53) (93% - 96%)    Parameters below as of 2022 17:35  Patient On (Oxygen Delivery Method): nasal cannula  O2 Flow (L/min): 4      GENERAL: NAD  HEAD:  Atraumatic, Normocephalic  EYES: EOMI, PERRLA, conjunctiva and sclera clear  ENT: Moist mucous membranes  NECK: Supple, No JVD  CHEST/LUNG: Clear to auscultation bilaterally; No wheezing or rhonchi  HEART: Regular rate and rhythm; No murmurs.  Peripheral edema: trace  ABDOMEN: BSx4; Soft, nontender, nondistended  EXTREMITIES:  2+ radial pulses  NERVOUS SYSTEM:  A&Ox3, no gross lateralizing deficits   SKIN: Many tattoos B/L upper extremities    LABS:                        12.6   17.60 )-----------( 209      ( 2022 06:43 )             39.8         138  |  104  |  14  ----------------------------<  122<H>  4.5   |  23  |  0.81    Ca    8.7      2022 06:36  Phos  3.0       Mg     1.8         TPro  7.1  /  Alb  3.8  /  TBili  0.4  /  DBili  x   /  AST  22  /  ALT  33  /  AlkPhos  77      PT/INR - ( 2022 08:23 )   PT: 20.6 sec;   INR: 1.78 ratio         PTT - ( 2022 08:23 )  PTT:34.2 sec      Urinalysis Basic - ( 2022 09:34 )    Color: Dark Yellow / Appearance: Clear / S.042 / pH: x  Gluc: x / Ketone: Trace  / Bili: Small / Urobili: 2 mg/dL   Blood: x / Protein: 30 mg/dL / Nitrite: Negative   Leuk Esterase: Negative / RBC: 3 /hpf / WBC 1 /HPF   Sq Epi: x / Non Sq Epi: 1 /hpf / Bacteria: Negative

## 2022-11-25 NOTE — DISCHARGE NOTE PROVIDER - HOSPITAL COURSE
HPI: The patient is a 61 YO M with a history of COPD (35 pack year smoking, quit in august), DVT on xarelto, recent COVID pneumonia last month s/p MAB infusion, HLD, GERD who presents with chills, malaise, and SOB that started last night. Yesterday he felt fine and spent the day on his boat fishing. Last night at around 2 am he felt chills and needed 4 blankets to stay warm. He measured his temperature with an infrared thermometer and it showed 102F, so he took tylenol and came to the hospital. He denies any chest pain, nausea, vomiting, constipation, diarrhea, or lower leg swelling. His baseline O2 sat is 92% on RA.     ED Course: Vitals on presentation were T 99.5. . 137/90 89% on RA. Placed on 4 L NC. Given 1L IVF. S/p zosyn. CT angio PE was negative for PE. Notable for left sided GGO's. RVP was negative. Given the hypoxemia, elevated HR, the patient was admitted to medicine for further management.    Hospital course: Patient improved with antibiotics to baseline O2 requirements. He was deemed ready for DC with completion of antibiotic course orally outpatient. He will finish with 5 more days of cephalexin and 3 more days of azithromycin. HPI: The patient is a 61 YO M with a history of COPD (35 pack year smoking, quit in august), DVT on xarelto, recent COVID pneumonia last month s/p MAB infusion, HLD, GERD who presents with chills, malaise, and SOB that started last night. Yesterday he felt fine and spent the day on his boat fishing. Last night at around 2 am he felt chills and needed 4 blankets to stay warm. He measured his temperature with an infrared thermometer and it showed 102F, so he took tylenol and came to the hospital. He denies any chest pain, nausea, vomiting, constipation, diarrhea, or lower leg swelling. His baseline O2 sat is 92% on RA.     ED Course: Vitals on presentation were T 99.5. . 137/90 89% on RA. Placed on 4 L NC. Given 1L IVF. S/p zosyn. CT angio PE was negative for PE. Notable for left sided GGO's. RVP was negative. Given the hypoxemia, elevated HR, the patient was admitted to medicine for further management.    Hospital course: Patient improved with antibiotics to baseline O2 requirements. He was deemed ready for DC with completion of antibiotic course orally outpatient. He will finish with 5 more days of cephalexin and 3 more days of azithromycin. He should follow with his pulmonologist within 2 weeks of discharge and continue to use his home O2 PRN.

## 2022-11-25 NOTE — PROGRESS NOTE ADULT - PROBLEM SELECTOR PLAN 5
- Knees b/l  - Steroid injection 11/5  - Celecoxib therapeutic interchange for meloxicam - Continue home statin.   - Should probably be on high intensity statin. Will suggest outpatient followup.

## 2022-11-25 NOTE — DISCHARGE NOTE PROVIDER - NSDCCPTREATMENT_GEN_ALL_CORE_FT
PRINCIPAL PROCEDURE  Procedure: CT chest w contrast  Findings and Treatment:   < end of copied text >  FINDINGS:  PULMONARY ANGIOGRAM: No pulmonary embolism in the main, lobar, segmental   or subsegmental pulmonary arteries.  LYMPH NODES: No lymphadenopathy.  HEART/VASCULATURE: Heart size within normal limits. No pericardial   effusion. Calcifications of the coronary arteries and aorta.  AIRWAYS/LUNGS/PLEURA: Emphysema. Groundglass opacities within the left   upper and lower lobe. Addition, there are likely subtle groundglass   opacities in the right middle lobe. Linear atelectasis in the right lower   lobe. No pleural effusion.  UPPER ABDOMEN: Hepatic steatosis.  BONES/SOFT TISSUES: Degenerative changes of the spine. No aggressive   osseous lesion.  IMPRESSION:  1.  No pulmonary embolism.  2.  Left upper and lower lobe ground glass opacities. These can be   secondary to the reported history of atypical viral infection, the   findings are nonspecific and a follow-up is suggested in 8-12 weeks for   resolution/change.< from: CT Angio Chest PE Protocol w/ IV Cont (11.24.22 @ 12:23) >

## 2022-11-25 NOTE — DISCHARGE NOTE PROVIDER - NSDCCPCAREPLAN_GEN_ALL_CORE_FT
PRINCIPAL DISCHARGE DIAGNOSIS  Diagnosis: Pneumonia  Assessment and Plan of Treatment: You same to the hospital because you had fever, chills, and shortness of breath. You were found to have ground glass opacities on your CT chest which could be a result of your previous COVID infection. You improved with IV antibiotics and were back to your baseline oxygen requirements. You should follow with your pulmonogist in a few weeks to repeat the CT scan to ensure resolution of the CT findings. You should also finish your antibiotic course outpatient.         PRINCIPAL DISCHARGE DIAGNOSIS  Diagnosis: Pneumonia  Assessment and Plan of Treatment: You same to the hospital because you had fever, chills, and shortness of breath. You were found to have ground glass opacities on your CT chest which could be a result of your previous COVID infection. You improved with IV antibiotics and were back to your baseline oxygen requirements. You should follow with your pulmonogist in a few weeks to repeat the CT scan to ensure resolution of the CT findings. You should also finish your antibiotic course outpatient.  You should follow with his pulmonologist within 2 weeks of discharge and continue to use home O2 as needed.   Please follow up with your primary care doctor within 1 week of discharge for further medical care.  If you experience fevers, chills, shortness of breath, chest pain, severe abdominal pain, falls with head trauma, or fainting, then please seek immediate emergency medical services.       PRINCIPAL DISCHARGE DIAGNOSIS  Diagnosis: Pneumonia  Assessment and Plan of Treatment: You came to the hospital because you had fever, chills, and shortness of breath. You were found to have ground glass opacities on your CT chest which could be a result of your previous COVID infection. You improved with IV antibiotics and were back to your baseline oxygen requirements. You should follow with your pulmonogist in a few weeks to repeat the CT scan to ensure resolution of the CT findings. You should also finish your antibiotic course outpatient. You will be discharged with Cephalexin for 8 mores day and Azithromycin for 3 more days.  You should follow with his pulmonologist within 2 weeks of discharge and continue to use home O2 as needed.   Please follow up with your primary care doctor within 1 week of discharge for further medical care.  If you experience fevers, chills, shortness of breath, chest pain, severe abdominal pain, falls with head trauma, or fainting, then please seek immediate emergency medical services.

## 2022-11-25 NOTE — DISCHARGE NOTE PROVIDER - NSDCFUADDAPPT_GEN_ALL_CORE_FT
Please follow up with your pulmonologist Dr. Wharton [phone] within 2 weeks of discharge for follow up.

## 2022-11-25 NOTE — PROGRESS NOTE ADULT - PROBLEM SELECTOR PLAN 4
- 2015 after meniscus surgery.  - Well followed by hematology outpatient. Positive for a lupus related antibody.   - Continue home xarelto - Knees b/l  - Steroid injection 11/5  - Celecoxib therapeutic interchange for meloxicam

## 2022-11-25 NOTE — PROGRESS NOTE ADULT - PROBLEM SELECTOR PLAN 3
- Not in acute exacerbation at this time but it is likely contributing to his presentation. 35 pack year smoking history, has not smoked in 4 months.   - Baseline O2 sat 92% on RA  - Using trelegy at home. Albuterol rescue inhaler he rarely uses.  - Can bring in home trelegy  - Duonebs q6 for now  - Wean O2 as tolerated - 2015 after meniscus surgery.  - Well followed by hematology outpatient. Positive for a lupus related antibody.   - Continue home xarelto

## 2022-11-25 NOTE — PROGRESS NOTE ADULT - ATTENDING COMMENTS
63 y/o M w/ PMHx of COPD (O2 prn), HLD, GERD, hx of DVT on Xarelto, recent COVID PNA (10/2022) presents with 1 day hx of feeling “unwell” fevers (TMax 102F), chills, rigors, generalized weakness, and SOB. No CP, abd pain, diarrhea, dysuria. No sick contacts. In ED, low grade temps, tachycardic to 130s, tachypneic, 4L NC with sats in the mid 90s. WBC 18. RVP negative. CTA: No PE. Left upper and lower lobe ground glass opacities. Can be 2/2 atypical viral infection. follow-up is suggested in 8-12 weeks for resolution/change.    #Sepsis   #PNA  Febrile, tachycardic, leukocytosis, CTA neg PE but notable for left upper and lower lobe ground glass opacities although possible 2/2 recent COVID PNA. RVP negative. No sputum production, abd pain, diarrhea, dysuria.   -Symptomatically improved, no fevers  -S/p zosyn and vanco, IVF. Start ceftriaxone and azithro. neg MRSA, legionella, Ustrep, BCx, elevated procal. Discharge with PO abx to treat CAP  -ambulatory pulse ox 88, has concentrator at home  -CURB65 score 0, can be discharged on PO antibiotics with outpt follow up    #COPD  Does not appear to be in an acute COPDex at this time. On oxygen prn at home.   -c/w trelegy, duonebs/albuterol nebs prn     #DVT  Hx of provoked DVT ~2015. Follows with hematology. C/w Xarelto for now, can discuss w/ outpt heme when to stop it and repeat hypercoag workup. Explained to pt and wife     d/w HS 1 61 y/o M w/ PMHx of COPD (O2 prn), HLD, GERD, hx of DVT on Xarelto, recent COVID PNA (10/2022) presents with 1 day hx of feeling “unwell” fevers (TMax 102F), chills, rigors, generalized weakness, and SOB. No CP, abd pain, diarrhea, dysuria. No sick contacts. In ED, low grade temps, tachycardic to 130s, tachypneic, 4L NC with sats in the mid 90s. WBC 18. RVP negative. CTA: No PE. Left upper and lower lobe ground glass opacities. Can be 2/2 atypical viral infection. follow-up is suggested in 8-12 weeks for resolution/change.    #Sepsis   #PNA  Febrile, tachycardic, leukocytosis, CTA neg PE but notable for left upper and lower lobe ground glass opacities although possible 2/2 recent COVID PNA. RVP negative. No sputum production, abd pain, diarrhea, dysuria.   -Symptomatically significantly improved, no fevers  -S/p zosyn and vanco, IVF. Start ceftriaxone and azithro. neg MRSA, legionella, Ustrep, BCx, elevated procal. Discharge with PO abx to treat CAP  -ambulatory pulse ox 88, has concentrator at home  -CURB65 score 0, can be discharged on PO antibiotics with outpt follow up    #COPD  Does not appear to be in an acute COPDex at this time. On oxygen prn at home.   -c/w trelegy, duonebs/albuterol nebs prn     #DVT  Hx of provoked DVT ~2015. Follows with hematology. C/w Xarelto for now, can discuss w/ outpt heme when to stop it and repeat hypercoag workup. Explained to pt and wife     d/w HS 1

## 2022-11-25 NOTE — PROGRESS NOTE ADULT - PROBLEM SELECTOR PLAN 1
- Initial vitals tachycardic, febrile at home, increased O2 requirements  - Elevated procal.  - F/u blood and urine cultures - Initial vitals tachycardic, febrile at home, increased O2 requirements  - Elevated procal.  - F/u blood and urine cultures. NGTD  - CURB 65 = 0 - CTA PE with left upper and lower lobe ground glass opacities. Emphysema. RVP negative. Procal elevated  - COVID pneumonia in october s/p MAB. Very likely the CT findings are a sequelae of that and not an active process  - Might still have a superimposed process. Elevated procal  - s/p zosyn in the ED.   - Will start ceftriaxone and azithromycin.  - MRSA PCR and urine strep negative. F/U legionella, blood and urine cultures 11/24  - CURB 65 = 0

## 2022-11-25 NOTE — PROGRESS NOTE ADULT - PROBLEM SELECTOR PLAN 2
- CTA PE with left upper and lower lobe ground glass opacities. Emphysema. RVP negative  - COVID pneumonia in october s/p MAB. Very likely the CT findings are a sequelae of that and not an active process  - Might still have a superimposed process. Elevated procal  - s/p zosyn in the ED.   - Will start ceftriaxone and azithromycin.  - F/u MRSA PCR, urine strep and legionella, blood and urine cultures 11/24 - CTA PE with left upper and lower lobe ground glass opacities. Emphysema. RVP negative  - COVID pneumonia in october s/p MAB. Very likely the CT findings are a sequelae of that and not an active process  - Might still have a superimposed process. Elevated procal  - s/p zosyn in the ED.   - Will start ceftriaxone and azithromycin.  - MRSA PCR and urine strep negative. F/U legionella, blood and urine cultures 11/24 - Not in acute exacerbation at this time but it is likely contributing to his presentation. 35 pack year smoking history, has not smoked in 4 months.   - Baseline O2 sat 92% on RA  - Using trelegy at home. Albuterol rescue inhaler he rarely uses.  - Can bring in home trelegy  - Duonebs q6 for now  - Wean O2 as tolerated

## 2022-11-25 NOTE — PROGRESS NOTE ADULT - PROBLEM SELECTOR PLAN 6
- Continue home statin.   - Should probably be on high intensity statin. Will suggest outpatient followup. Diet: regular  DVT proph: xarelto  Dispo: Likely home

## 2022-11-25 NOTE — DISCHARGE NOTE PROVIDER - NSDCFUSCHEDAPPT_GEN_ALL_CORE_FT
Valerie Reid  [facility]  [facility]  Scheduled Appointment: 12/07/2022    Tino Worthy  [facility]  [address]  Scheduled Appointment: 12/09/2022

## 2022-11-25 NOTE — DISCHARGE NOTE PROVIDER - CARE PROVIDER_API CALL
Jasiel Barry (DO)  Internal Medicine  [address], [address]  [place], [place] [id number]  Phone: [phone]  Fax: [phone]  Follow Up Time: 2 weeks

## 2022-11-28 ENCOUNTER — RX RENEWAL (OUTPATIENT)
Age: 62
End: 2022-11-28

## 2022-12-06 ENCOUNTER — OUTPATIENT (OUTPATIENT)
Dept: OUTPATIENT SERVICES | Facility: HOSPITAL | Age: 62
LOS: 1 days | Discharge: HOME | End: 2022-12-06

## 2022-12-06 DIAGNOSIS — Z98.89 OTHER SPECIFIED POSTPROCEDURAL STATES: Chronic | ICD-10-CM

## 2022-12-06 DIAGNOSIS — D64.9 ANEMIA, UNSPECIFIED: ICD-10-CM

## 2022-12-06 DIAGNOSIS — Z96.652 PRESENCE OF LEFT ARTIFICIAL KNEE JOINT: Chronic | ICD-10-CM

## 2022-12-07 ENCOUNTER — APPOINTMENT (OUTPATIENT)
Dept: HEMATOLOGY ONCOLOGY | Facility: CLINIC | Age: 62
End: 2022-12-07

## 2022-12-07 PROCEDURE — 99214 OFFICE O/P EST MOD 30 MIN: CPT | Mod: 95

## 2022-12-07 NOTE — PHYSICAL EXAM
[Normal] : affect appropriate [de-identified] : non-toxic appearing [de-identified] : breathing appeared unlabored [de-identified] : coloration appeared normal

## 2022-12-07 NOTE — REASON FOR VISIT
[Follow-Up Visit] : a follow-up visit for [Home] : at home, [unfilled] , at the time of the visit. [Medical Office: ([facility])___] : at the medical office located in  [Patient] : the patient [Self] : self [FreeTextEntry2] : DVT

## 2022-12-07 NOTE — ASSESSMENT
[FreeTextEntry1] : Patient with history of right lower extremity DVT, and lupus anticoagulant-reviewed potential thrombotic risks without anticoagulation to be considered versus bleeding risks with anticoagulation. \par Explained potential implications of a positive lupus anticoagulant/phospholipid antibody associated with thrombotic disease.  Would like to have lupus anticoagulant tested when patient off anticoagulation, as prior testing was done while he was taking Xarelto from what I can glean from his history.  Discussed anticoagulation in the setting of a positive lupus anticoagulant, with recommendation for indefinite Coumadin rather than Xarelto. \par \par Patient will now hold his Xarelto x 3 days and have the lupus anticoagulant checked. If persistently +LA, advised that he change anticoagulation to Coumadin. If LA is negative off xarelto, may now consider holding the xarelto with interval f/u venous US.\par \par Patient was given the opportunity to ask questions. His questions have been answered to his satisfaction.

## 2022-12-07 NOTE — HISTORY OF PRESENT ILLNESS
[de-identified] : Patient underwent left knee arthroscopic surgery 8/2014. Approximately 2 weeks postoperatively, he noticed right lower extremity swelling and pain. He sought attention at an urgent care center, and had a right lower extremity ultrasound which revealed a DVT within the right popliteal, proximal and mid posterior tibial veins.He was begun on Xarelto.He had no prior history of venous thromboembolic disease nor any family history of venous thromboembolism.\par Prothrombin gene mutation study normal. APC resistance within normal limits. +LA.  ROBBIE WNL.  Maintained on Xarelto .\par \par 2/2018-10/2022-No hematology f/u.\par \par 10/2022-Returned for hematology f/u. [de-identified] : S/P COVID pneumonia 10/2022.\par 11/24/22-Readmitted  for a pneumonia (not COVID).\par No current complaints of chest pain, shortness of breath, calf pain. No abnormal bleeding reported. No fevers.\par \par Has Had COVID vaccines

## 2022-12-09 ENCOUNTER — APPOINTMENT (OUTPATIENT)
Dept: ORTHOPEDIC SURGERY | Facility: CLINIC | Age: 62
End: 2022-12-09

## 2022-12-09 VITALS — WEIGHT: 287 LBS | HEIGHT: 74 IN | BODY MASS INDEX: 36.83 KG/M2

## 2022-12-09 DIAGNOSIS — Z87.01 PERSONAL HISTORY OF PNEUMONIA (RECURRENT): ICD-10-CM

## 2022-12-09 PROCEDURE — 99203 OFFICE O/P NEW LOW 30 MIN: CPT

## 2022-12-09 NOTE — REASON FOR VISIT
[FreeTextEntry2] : 12/09/2022 Mr. JAYLYN MAYER, a 62 year old male, presents today for right knee pain six month onset, progressively worst\par \par

## 2022-12-09 NOTE — IMAGING
[Right] : right knee [All Views] : anteroposterior, lateral, skyline, and anteroposterior standing [There are no fractures, subluxations or dislocations. No significant abnormalities are seen] : There are no fractures, subluxations or dislocations. No significant abnormalities are seen [Degenerative change] : Degenerative change [FreeTextEntry9] : x-ray from 11.05.22

## 2022-12-09 NOTE — DISCUSSION/SUMMARY
[de-identified] : 62m with right knee djd. Temporary relief after CSI\par 1) cryotherapy, rest and activity modification\par 2) discussed availability of visco injections and pt would like to proceed. will request auth for a series of injections. \par 3) hep, glucosamine / chondroitin\par 4) rtc with auth for injections\par \par Entered by Deisy Warren acting as scribe.\par

## 2022-12-09 NOTE — HISTORY OF PRESENT ILLNESS
[Gradual] : gradual [10] : 10 [7] : 7 [Dull/Aching] : dull/aching [Localized] : localized [Sharp] : sharp [Throbbing] : throbbing [Intermittent] : intermittent [Household chores] : household chores [Leisure] : leisure [Sleep] : sleep [Social interactions] : social interactions [Nothing helps with pain getting better] : Nothing helps with pain getting better [Standing] : standing [Walking] : walking [Exercising] : exercising [Stairs] : stairs [Lying in bed] : lying in bed [Retired] : Work status: retired [de-identified] : 12/09/2022 Mr. JAYLYN MAYER, a 62 year old male, presents today for right knee. Seen in [facility] on 11.05.22, received a csi with temporary relief. Reports continued and worsening right knee pain and stiffness. Pain with walking and increased activity. \par hx of left TKA\par Retired [] : no

## 2022-12-09 NOTE — PHYSICAL EXAM
[Right] : right knee [] : patient ambulates without assistive device [TWNoteComboBox7] : flexion 120 degrees [de-identified] : extension 3 degrees

## 2022-12-12 ENCOUNTER — LABORATORY RESULT (OUTPATIENT)
Age: 62
End: 2022-12-12

## 2022-12-12 ENCOUNTER — NON-APPOINTMENT (OUTPATIENT)
Age: 62
End: 2022-12-12

## 2022-12-19 ENCOUNTER — NON-APPOINTMENT (OUTPATIENT)
Age: 62
End: 2022-12-19

## 2022-12-20 ENCOUNTER — APPOINTMENT (OUTPATIENT)
Dept: ORTHOPEDIC SURGERY | Facility: CLINIC | Age: 62
End: 2022-12-20

## 2022-12-22 ENCOUNTER — APPOINTMENT (OUTPATIENT)
Dept: ORTHOPEDIC SURGERY | Facility: CLINIC | Age: 62
End: 2022-12-22

## 2022-12-22 VITALS — BODY MASS INDEX: 36.83 KG/M2 | HEIGHT: 74 IN | WEIGHT: 287 LBS

## 2022-12-22 PROCEDURE — 20611 DRAIN/INJ JOINT/BURSA W/US: CPT | Mod: RT

## 2022-12-22 PROCEDURE — 99212 OFFICE O/P EST SF 10 MIN: CPT | Mod: 25

## 2022-12-22 NOTE — DISCUSSION/SUMMARY
[de-identified] : 62m with right knee djd. Orthovisc #1 Injection tolerated well. Post injection instructions reviewed.\par 1) wbat, cryotherapy\par 2) rtc 1 week\par \par Entered by Deisy Warren acting as scribe.\par  [FreeTextEntry1] : Needs a new PCP;  [de-identified] : History of Pulmonary embolism; \par Also will have a Total Knee replacement at [facility] . Right knee;\par Has learning disability\par No exercise secondary to pain in  knee\par Mom  from Pancreatic Cancer\par Dad  MI;\par \par \par

## 2022-12-22 NOTE — HISTORY OF PRESENT ILLNESS
[1] : 1 [Orthovisc] : Orthovisc [10] : 10 [7] : 7 [de-identified] : 12/22/2022: Here to f/up right knee and Orthovisc # 1\par 12/09/2022 Mr. JAYLYN MAYER, a 62 year old male, presents today for right knee. Seen in [facility] on 11.05.22, received a csi with temporary relief. Reports continued and worsening right knee pain and stiffness. Pain with walking and increased activity. \par hx of left TKA\par Retired [] : This patient has had an injection before: no [FreeTextEntry1] : right knee  [FreeTextEntry5] :  JAYLYN MAYER is a 62 year male who is here today for right knee inj # 1. Pain is same as last visit.

## 2022-12-22 NOTE — PROCEDURE
[FreeTextEntry3] : Large joint injection was performed  of the right knee. The indication for this procedure was x-ray evidence of Osteoarthritis on this or prior visit. The site was prepped with alcohol and betadine. An injection of Lidocaine 3cc of 1% , Orthovisc  2ml (15mg/ml), # [1 ] was used. \par \par Patient was advised to call if redness, pain or fever occur and apply ice for 15 minutes out of every hour for the next 12-24 hours as tolerated. \par \par Patient has tried OTC's including aspirin, Ibuprofen, Aleve, etc or prescription NSAIDS, and/or exercises at home and/or physical therapy without satisfactory response, patient had decreased mobility in the joint and the risks benefits, and alternatives have been discussed, and verbal consent was obtained. \par \par The risks, benefits and contents of the injection have been discussed.  Risks include but are not limited to allergic reaction, flare reaction, permanent white skin discoloration at the injection site and infection.  The patient understands the risks and agrees to having the injection.  All questions have been answered.\par \par Ultrasound guidance was indicated for this patient due to prior failure or difficult injection.

## 2022-12-28 ENCOUNTER — RX RENEWAL (OUTPATIENT)
Age: 62
End: 2022-12-28

## 2022-12-29 ENCOUNTER — APPOINTMENT (OUTPATIENT)
Dept: ORTHOPEDIC SURGERY | Facility: CLINIC | Age: 62
End: 2022-12-29

## 2022-12-29 PROCEDURE — 20611 DRAIN/INJ JOINT/BURSA W/US: CPT

## 2022-12-29 PROCEDURE — 99212 OFFICE O/P EST SF 10 MIN: CPT | Mod: 25

## 2022-12-29 NOTE — PHYSICAL EXAM
[Right] : right knee [] : no extensor lag [TWNoteComboBox7] : flexion 120 degrees [de-identified] : extension 3 degrees

## 2022-12-29 NOTE — HISTORY OF PRESENT ILLNESS
[10] : 10 [7] : 7 [2] : 2 [Orthovisc] : Orthovisc [de-identified] : 12/29/2022: Here to f/up right knee and Orthovisc #2\par 12/22/2022: Here to f/up right knee and Orthovisc # 1\par 12/09/2022 Mr. JAYLYN MAYER, a 62 year old male, presents today for right knee. Seen in [facility] on 11.05.22, received a csi with temporary relief. Reports continued and worsening right knee pain and stiffness. Pain with walking and increased activity. \par hx of left TKA\par Retired [] : no [FreeTextEntry1] : right knee  [FreeTextEntry5] :  JAYLYN MAYER is a 62 year male who is here today for right knee inj # 2 [de-identified] : 12/22/22

## 2022-12-29 NOTE — PROCEDURE
[FreeTextEntry3] : Large joint injection was performed  of the right knee. The indication for this procedure was x-ray evidence of Osteoarthritis on this or prior visit. The site was prepped with alcohol and betadine. An injection of Lidocaine 3cc of 1% , Orthovisc  2ml (15mg/ml), # [2 ] was used. \par \par Patient was advised to call if redness, pain or fever occur and apply ice for 15 minutes out of every hour for the next 12-24 hours as tolerated. \par \par Patient has tried OTC's including aspirin, Ibuprofen, Aleve, etc or prescription NSAIDS, and/or exercises at home and/or physical therapy without satisfactory response, patient had decreased mobility in the joint and the risks benefits, and alternatives have been discussed, and verbal consent was obtained. \par \par The risks, benefits and contents of the injection have been discussed.  Risks include but are not limited to allergic reaction, flare reaction, permanent white skin discoloration at the injection site and infection.  The patient understands the risks and agrees to having the injection.  All questions have been answered.\par \par Ultrasound guidance was indicated for this patient due to prior failure or difficult injection.

## 2022-12-29 NOTE — DISCUSSION/SUMMARY
[de-identified] : 62m with right knee djd. Orthovisc #2 Injection tolerated well. Post injection instructions reviewed.\par 1) wbat, cryotherapy\par 2) rtc 1 week\par \par Entered by Deisy Warren acting as scribe.\par

## 2023-01-05 ENCOUNTER — APPOINTMENT (OUTPATIENT)
Dept: ORTHOPEDIC SURGERY | Facility: CLINIC | Age: 63
End: 2023-01-05
Payer: COMMERCIAL

## 2023-01-05 VITALS — HEIGHT: 74 IN | WEIGHT: 287 LBS | BODY MASS INDEX: 36.83 KG/M2

## 2023-01-05 PROCEDURE — 99212 OFFICE O/P EST SF 10 MIN: CPT | Mod: 25

## 2023-01-05 PROCEDURE — 20611 DRAIN/INJ JOINT/BURSA W/US: CPT

## 2023-01-05 NOTE — DISCUSSION/SUMMARY
[de-identified] : 62m with right knee djd. Orthovisc #3 Injection tolerated well. Post injection instructions reviewed.\par 1) wbat, cryotherapy\par 2) rtc 1 week\par \par Entered by Deisy Warren acting as scribe.\par

## 2023-01-05 NOTE — PROCEDURE
[FreeTextEntry3] : Large joint injection was performed  of the right knee. The indication for this procedure was x-ray evidence of Osteoarthritis on this or prior visit. The site was prepped with alcohol and betadine. An injection of Lidocaine 3cc of 1% , Orthovisc  2ml (15mg/ml), # [3] was used. \par \par Patient was advised to call if redness, pain or fever occur and apply ice for 15 minutes out of every hour for the next 12-24 hours as tolerated. \par \par Patient has tried OTC's including aspirin, Ibuprofen, Aleve, etc or prescription NSAIDS, and/or exercises at home and/or physical therapy without satisfactory response, patient had decreased mobility in the joint and the risks benefits, and alternatives have been discussed, and verbal consent was obtained. \par \par The risks, benefits and contents of the injection have been discussed.  Risks include but are not limited to allergic reaction, flare reaction, permanent white skin discoloration at the injection site and infection.  The patient understands the risks and agrees to having the injection.  All questions have been answered.\par \par Ultrasound guidance was indicated for this patient due to prior failure or difficult injection.

## 2023-01-05 NOTE — HISTORY OF PRESENT ILLNESS
[6] : 6 [3] : 3 [Orthovisc] : Orthovisc [de-identified] : 01/05/2023: Here to f/up right knee and Orthovisc # 3\par 12/29/2022: Here to f/up right knee and Orthovisc # 2\par 12/22/2022: Here to f/up right knee and Orthovisc # 1\par 12/09/2022 Mr. JAYLYN MAYER, a 62 year old male, presents today for right knee. Seen in [facility] on 11.05.22, received a csi with temporary relief. Reports continued and worsening right knee pain and stiffness. Pain with walking and increased activity. \par hx of left TKA\par Retired [] : no [FreeTextEntry1] : right knee  [FreeTextEntry5] :  JAYLYN MAYER is a 62 year male who is here today for right knee inj # 3 [de-identified] : 12/29/22 [TWNoteComboBox1] : 10%

## 2023-01-05 NOTE — PHYSICAL EXAM
[Right] : right knee [] : no extensor lag [TWNoteComboBox7] : flexion 120 degrees [de-identified] : extension 3 degrees

## 2023-01-10 ENCOUNTER — OUTPATIENT (OUTPATIENT)
Dept: OUTPATIENT SERVICES | Facility: HOSPITAL | Age: 63
LOS: 1 days | End: 2023-01-10
Payer: COMMERCIAL

## 2023-01-10 ENCOUNTER — APPOINTMENT (OUTPATIENT)
Dept: RADIOLOGY | Facility: CLINIC | Age: 63
End: 2023-01-10
Payer: COMMERCIAL

## 2023-01-10 DIAGNOSIS — Z00.8 ENCOUNTER FOR OTHER GENERAL EXAMINATION: ICD-10-CM

## 2023-01-10 PROCEDURE — 71046 X-RAY EXAM CHEST 2 VIEWS: CPT | Mod: 26

## 2023-01-10 PROCEDURE — 71046 X-RAY EXAM CHEST 2 VIEWS: CPT

## 2023-01-12 ENCOUNTER — APPOINTMENT (OUTPATIENT)
Dept: ORTHOPEDIC SURGERY | Facility: CLINIC | Age: 63
End: 2023-01-12
Payer: COMMERCIAL

## 2023-01-12 VITALS — HEIGHT: 74 IN | BODY MASS INDEX: 36.83 KG/M2 | WEIGHT: 287 LBS

## 2023-01-12 PROCEDURE — 20611 DRAIN/INJ JOINT/BURSA W/US: CPT

## 2023-01-12 PROCEDURE — 99212 OFFICE O/P EST SF 10 MIN: CPT | Mod: 25

## 2023-01-12 NOTE — PHYSICAL EXAM
[Right] : right knee [] : no extensor lag [TWNoteComboBox7] : flexion 120 degrees [de-identified] : extension 3 degrees

## 2023-01-12 NOTE — PROCEDURE
[FreeTextEntry3] : Large joint injection was performed  of the right knee. The indication for this procedure was x-ray evidence of Osteoarthritis on this or prior visit. The site was prepped with alcohol and betadine. An injection of Lidocaine 3cc of 1% , Orthovisc  2ml (15mg/ml), # [4] was used. \par \par Patient was advised to call if redness, pain or fever occur and apply ice for 15 minutes out of every hour for the next 12-24 hours as tolerated. \par \par Patient has tried OTC's including aspirin, Ibuprofen, Aleve, etc or prescription NSAIDS, and/or exercises at home and/or physical therapy without satisfactory response, patient had decreased mobility in the joint and the risks benefits, and alternatives have been discussed, and verbal consent was obtained. \par \par The risks, benefits and contents of the injection have been discussed.  Risks include but are not limited to allergic reaction, flare reaction, permanent white skin discoloration at the injection site and infection.  The patient understands the risks and agrees to having the injection.  All questions have been answered.\par \par Ultrasound guidance was indicated for this patient due to prior failure or difficult injection.

## 2023-01-12 NOTE — DISCUSSION/SUMMARY
[de-identified] : 62m with right knee djd. Orthovisc #4 Injection tolerated well. Post injection instructions reviewed.\par 1) wbat, cryotherapy\par 2) rtc6  week\par \par Entered by Deisy Warren acting as scribe.\par  Speaking Coherently

## 2023-01-12 NOTE — HISTORY OF PRESENT ILLNESS
[4] : 4 [Orthovisc] : Orthovisc [5] : 5 [de-identified] : 1/12/23: Here for fu orthovisc #3\par 01/05/2023: Here to f/up right knee and Orthovisc # 3\par 12/29/2022: Here to f/up right knee and Orthovisc # 2\par 12/22/2022: Here to f/up right knee and Orthovisc # 1\par 12/09/2022 Mr. JAYLYN MAYER, a 62 year old male, presents today for right knee. Seen in [facility] on 11.05.22, received a csi with temporary relief. Reports continued and worsening right knee pain and stiffness. Pain with walking and increased activity. \par hx of left TKA\par Retired [] : no [FreeTextEntry1] : right knee  [FreeTextEntry5] :  JAYLYN MAYER is a 62 year male who is here today for right knee inj # 4 [de-identified] : 01/05/23 [TWNoteComboBox1] : 20%

## 2023-01-13 ENCOUNTER — INPATIENT (INPATIENT)
Facility: HOSPITAL | Age: 63
LOS: 1 days | Discharge: HOME | DRG: 871 | End: 2023-01-15
Attending: INTERNAL MEDICINE | Admitting: STUDENT IN AN ORGANIZED HEALTH CARE EDUCATION/TRAINING PROGRAM
Payer: COMMERCIAL

## 2023-01-13 VITALS
TEMPERATURE: 99 F | HEART RATE: 101 BPM | WEIGHT: 285.06 LBS | DIASTOLIC BLOOD PRESSURE: 85 MMHG | OXYGEN SATURATION: 90 % | RESPIRATION RATE: 24 BRPM | SYSTOLIC BLOOD PRESSURE: 136 MMHG | HEIGHT: 74 IN

## 2023-01-13 DIAGNOSIS — Z98.89 OTHER SPECIFIED POSTPROCEDURAL STATES: Chronic | ICD-10-CM

## 2023-01-13 DIAGNOSIS — R06.02 SHORTNESS OF BREATH: ICD-10-CM

## 2023-01-13 DIAGNOSIS — Z96.652 PRESENCE OF LEFT ARTIFICIAL KNEE JOINT: Chronic | ICD-10-CM

## 2023-01-13 LAB
ALBUMIN SERPL ELPH-MCNC: 3.7 G/DL — SIGNIFICANT CHANGE UP (ref 3.3–5)
ALP SERPL-CCNC: 82 U/L — SIGNIFICANT CHANGE UP (ref 40–120)
ALT FLD-CCNC: 39 U/L — SIGNIFICANT CHANGE UP (ref 10–45)
ANION GAP SERPL CALC-SCNC: 6 MMOL/L — SIGNIFICANT CHANGE UP (ref 5–17)
APPEARANCE UR: CLEAR — SIGNIFICANT CHANGE UP
APTT BLD: 25.7 SEC — LOW (ref 27.5–35.5)
AST SERPL-CCNC: 20 U/L — SIGNIFICANT CHANGE UP (ref 10–40)
BACTERIA # UR AUTO: NEGATIVE /HPF — SIGNIFICANT CHANGE UP
BASOPHILS # BLD AUTO: 0.09 K/UL — SIGNIFICANT CHANGE UP (ref 0–0.2)
BASOPHILS NFR BLD AUTO: 0.4 % — SIGNIFICANT CHANGE UP (ref 0–2)
BILIRUB SERPL-MCNC: 0.4 MG/DL — SIGNIFICANT CHANGE UP (ref 0.2–1.2)
BILIRUB UR-MCNC: NEGATIVE — SIGNIFICANT CHANGE UP
BUN SERPL-MCNC: 16 MG/DL — SIGNIFICANT CHANGE UP (ref 7–23)
CALCIUM SERPL-MCNC: 9.2 MG/DL — SIGNIFICANT CHANGE UP (ref 8.4–10.5)
CHLORIDE SERPL-SCNC: 105 MMOL/L — SIGNIFICANT CHANGE UP (ref 96–108)
CO2 SERPL-SCNC: 27 MMOL/L — SIGNIFICANT CHANGE UP (ref 22–31)
COLOR SPEC: YELLOW — SIGNIFICANT CHANGE UP
CREAT SERPL-MCNC: 1.03 MG/DL — SIGNIFICANT CHANGE UP (ref 0.5–1.3)
D DIMER BLD IA.RAPID-MCNC: 1280 NG/ML DDU — HIGH
DIFF PNL FLD: NEGATIVE — SIGNIFICANT CHANGE UP
EGFR: 82 ML/MIN/1.73M2 — SIGNIFICANT CHANGE UP
EOSINOPHIL # BLD AUTO: 0.02 K/UL — SIGNIFICANT CHANGE UP (ref 0–0.5)
EOSINOPHIL NFR BLD AUTO: 0.1 % — SIGNIFICANT CHANGE UP (ref 0–6)
EPI CELLS # UR: SIGNIFICANT CHANGE UP
FLUAV AG NPH QL: SIGNIFICANT CHANGE UP
FLUBV AG NPH QL: SIGNIFICANT CHANGE UP
GLUCOSE SERPL-MCNC: 103 MG/DL — HIGH (ref 70–99)
GLUCOSE UR QL: NEGATIVE — SIGNIFICANT CHANGE UP
HCT VFR BLD CALC: 43.4 % — SIGNIFICANT CHANGE UP (ref 39–50)
HGB BLD-MCNC: 14 G/DL — SIGNIFICANT CHANGE UP (ref 13–17)
IMM GRANULOCYTES NFR BLD AUTO: 0.9 % — SIGNIFICANT CHANGE UP (ref 0–0.9)
INR BLD: 1.06 RATIO — SIGNIFICANT CHANGE UP (ref 0.88–1.16)
KETONES UR-MCNC: NEGATIVE — SIGNIFICANT CHANGE UP
LACTATE SERPL-SCNC: 1 MMOL/L — SIGNIFICANT CHANGE UP (ref 0.7–2)
LEUKOCYTE ESTERASE UR-ACNC: NEGATIVE — SIGNIFICANT CHANGE UP
LYMPHOCYTES # BLD AUTO: 1.24 K/UL — SIGNIFICANT CHANGE UP (ref 1–3.3)
LYMPHOCYTES # BLD AUTO: 5.4 % — LOW (ref 13–44)
MCHC RBC-ENTMCNC: 30.1 PG — SIGNIFICANT CHANGE UP (ref 27–34)
MCHC RBC-ENTMCNC: 32.3 GM/DL — SIGNIFICANT CHANGE UP (ref 32–36)
MCV RBC AUTO: 93.3 FL — SIGNIFICANT CHANGE UP (ref 80–100)
MONOCYTES # BLD AUTO: 2.27 K/UL — HIGH (ref 0–0.9)
MONOCYTES NFR BLD AUTO: 9.8 % — SIGNIFICANT CHANGE UP (ref 2–14)
NEUTROPHILS # BLD AUTO: 19.22 K/UL — HIGH (ref 1.8–7.4)
NEUTROPHILS NFR BLD AUTO: 83.4 % — HIGH (ref 43–77)
NITRITE UR-MCNC: NEGATIVE — SIGNIFICANT CHANGE UP
NRBC # BLD: 0 /100 WBCS — SIGNIFICANT CHANGE UP (ref 0–0)
NT-PROBNP SERPL-SCNC: 149 PG/ML — SIGNIFICANT CHANGE UP (ref 0–300)
PH UR: 5 — SIGNIFICANT CHANGE UP (ref 5–8)
PLATELET # BLD AUTO: 231 K/UL — SIGNIFICANT CHANGE UP (ref 150–400)
POTASSIUM SERPL-MCNC: 4.6 MMOL/L — SIGNIFICANT CHANGE UP (ref 3.5–5.3)
POTASSIUM SERPL-SCNC: 4.6 MMOL/L — SIGNIFICANT CHANGE UP (ref 3.5–5.3)
PROCALCITONIN SERPL-MCNC: 1.1 NG/ML — HIGH
PROT SERPL-MCNC: 7 G/DL — SIGNIFICANT CHANGE UP (ref 6–8.3)
PROT UR-MCNC: 15
PROTHROM AB SERPL-ACNC: 12.3 SEC — SIGNIFICANT CHANGE UP (ref 10.5–13.4)
RBC # BLD: 4.65 M/UL — SIGNIFICANT CHANGE UP (ref 4.2–5.8)
RBC # FLD: 13.4 % — SIGNIFICANT CHANGE UP (ref 10.3–14.5)
RBC CASTS # UR COMP ASSIST: NEGATIVE /HPF — SIGNIFICANT CHANGE UP (ref 0–4)
RSV RNA NPH QL NAA+NON-PROBE: SIGNIFICANT CHANGE UP
SARS-COV-2 RNA SPEC QL NAA+PROBE: SIGNIFICANT CHANGE UP
SODIUM SERPL-SCNC: 138 MMOL/L — SIGNIFICANT CHANGE UP (ref 135–145)
SP GR SPEC: 1 — LOW (ref 1.01–1.02)
TROPONIN I, HIGH SENSITIVITY RESULT: 8.2 NG/L — SIGNIFICANT CHANGE UP
UROBILINOGEN FLD QL: NEGATIVE — SIGNIFICANT CHANGE UP
WBC # BLD: 23.05 K/UL — HIGH (ref 3.8–10.5)
WBC # FLD AUTO: 23.05 K/UL — HIGH (ref 3.8–10.5)
WBC UR QL: NEGATIVE /HPF — SIGNIFICANT CHANGE UP (ref 0–5)

## 2023-01-13 PROCEDURE — 99223 1ST HOSP IP/OBS HIGH 75: CPT

## 2023-01-13 PROCEDURE — 99285 EMERGENCY DEPT VISIT HI MDM: CPT

## 2023-01-13 PROCEDURE — 71045 X-RAY EXAM CHEST 1 VIEW: CPT | Mod: 26

## 2023-01-13 PROCEDURE — 93010 ELECTROCARDIOGRAM REPORT: CPT

## 2023-01-13 PROCEDURE — 71275 CT ANGIOGRAPHY CHEST: CPT | Mod: 26,MA

## 2023-01-13 RX ORDER — IPRATROPIUM/ALBUTEROL SULFATE 18-103MCG
3 AEROSOL WITH ADAPTER (GRAM) INHALATION ONCE
Refills: 0 | Status: COMPLETED | OUTPATIENT
Start: 2023-01-13 | End: 2023-01-13

## 2023-01-13 RX ORDER — CELECOXIB 200 MG/1
200 CAPSULE ORAL DAILY
Refills: 0 | Status: DISCONTINUED | OUTPATIENT
Start: 2023-01-13 | End: 2023-01-15

## 2023-01-13 RX ORDER — ONDANSETRON 8 MG/1
4 TABLET, FILM COATED ORAL EVERY 8 HOURS
Refills: 0 | Status: DISCONTINUED | OUTPATIENT
Start: 2023-01-13 | End: 2023-01-15

## 2023-01-13 RX ORDER — BUDESONIDE AND FORMOTEROL FUMARATE DIHYDRATE 160; 4.5 UG/1; UG/1
2 AEROSOL RESPIRATORY (INHALATION)
Refills: 0 | Status: DISCONTINUED | OUTPATIENT
Start: 2023-01-13 | End: 2023-01-15

## 2023-01-13 RX ORDER — LANOLIN ALCOHOL/MO/W.PET/CERES
3 CREAM (GRAM) TOPICAL AT BEDTIME
Refills: 0 | Status: DISCONTINUED | OUTPATIENT
Start: 2023-01-13 | End: 2023-01-15

## 2023-01-13 RX ORDER — ENOXAPARIN SODIUM 100 MG/ML
40 INJECTION SUBCUTANEOUS EVERY 24 HOURS
Refills: 0 | Status: DISCONTINUED | OUTPATIENT
Start: 2023-01-13 | End: 2023-01-15

## 2023-01-13 RX ORDER — CEFTRIAXONE 500 MG/1
1000 INJECTION, POWDER, FOR SOLUTION INTRAMUSCULAR; INTRAVENOUS ONCE
Refills: 0 | Status: COMPLETED | OUTPATIENT
Start: 2023-01-13 | End: 2023-01-13

## 2023-01-13 RX ORDER — TIOTROPIUM BROMIDE 18 UG/1
2 CAPSULE ORAL; RESPIRATORY (INHALATION) DAILY
Refills: 0 | Status: DISCONTINUED | OUTPATIENT
Start: 2023-01-13 | End: 2023-01-15

## 2023-01-13 RX ORDER — ATORVASTATIN CALCIUM 80 MG/1
20 TABLET, FILM COATED ORAL AT BEDTIME
Refills: 0 | Status: DISCONTINUED | OUTPATIENT
Start: 2023-01-13 | End: 2023-01-15

## 2023-01-13 RX ORDER — ACETAMINOPHEN 500 MG
650 TABLET ORAL EVERY 6 HOURS
Refills: 0 | Status: DISCONTINUED | OUTPATIENT
Start: 2023-01-13 | End: 2023-01-15

## 2023-01-13 RX ORDER — PANTOPRAZOLE SODIUM 20 MG/1
40 TABLET, DELAYED RELEASE ORAL
Refills: 0 | Status: DISCONTINUED | OUTPATIENT
Start: 2023-01-13 | End: 2023-01-15

## 2023-01-13 RX ORDER — ALBUTEROL 90 UG/1
2 AEROSOL, METERED ORAL EVERY 6 HOURS
Refills: 0 | Status: DISCONTINUED | OUTPATIENT
Start: 2023-01-13 | End: 2023-01-15

## 2023-01-13 RX ORDER — CEFEPIME 1 G/1
1000 INJECTION, POWDER, FOR SOLUTION INTRAMUSCULAR; INTRAVENOUS EVERY 8 HOURS
Refills: 0 | Status: DISCONTINUED | OUTPATIENT
Start: 2023-01-13 | End: 2023-01-15

## 2023-01-13 RX ORDER — AZITHROMYCIN 500 MG/1
500 TABLET, FILM COATED ORAL ONCE
Refills: 0 | Status: COMPLETED | OUTPATIENT
Start: 2023-01-13 | End: 2023-01-13

## 2023-01-13 RX ORDER — GUAIFENESIN/DEXTROMETHORPHAN 600MG-30MG
10 TABLET, EXTENDED RELEASE 12 HR ORAL EVERY 4 HOURS
Refills: 0 | Status: DISCONTINUED | OUTPATIENT
Start: 2023-01-13 | End: 2023-01-15

## 2023-01-13 RX ADMIN — Medication 100 MILLIGRAM(S): at 21:30

## 2023-01-13 RX ADMIN — ATORVASTATIN CALCIUM 20 MILLIGRAM(S): 80 TABLET, FILM COATED ORAL at 21:30

## 2023-01-13 RX ADMIN — CEFEPIME 100 MILLIGRAM(S): 1 INJECTION, POWDER, FOR SOLUTION INTRAMUSCULAR; INTRAVENOUS at 21:30

## 2023-01-13 RX ADMIN — AZITHROMYCIN 255 MILLIGRAM(S): 500 TABLET, FILM COATED ORAL at 13:20

## 2023-01-13 RX ADMIN — CEFTRIAXONE 100 MILLIGRAM(S): 500 INJECTION, POWDER, FOR SOLUTION INTRAMUSCULAR; INTRAVENOUS at 12:55

## 2023-01-13 RX ADMIN — AZITHROMYCIN 500 MILLIGRAM(S): 500 TABLET, FILM COATED ORAL at 14:20

## 2023-01-13 RX ADMIN — Medication 3 MILLILITER(S): at 18:18

## 2023-01-13 RX ADMIN — CEFTRIAXONE 1000 MILLIGRAM(S): 500 INJECTION, POWDER, FOR SOLUTION INTRAMUSCULAR; INTRAVENOUS at 13:20

## 2023-01-13 RX ADMIN — ENOXAPARIN SODIUM 40 MILLIGRAM(S): 100 INJECTION SUBCUTANEOUS at 21:29

## 2023-01-13 RX ADMIN — BUDESONIDE AND FORMOTEROL FUMARATE DIHYDRATE 2 PUFF(S): 160; 4.5 AEROSOL RESPIRATORY (INHALATION) at 22:18

## 2023-01-13 NOTE — H&P ADULT - HISTORY OF PRESENT ILLNESS
63 YO M with a history of COPD (35 pack year smoking, quit in august), DVT on xarelto, recent COVID pneumonia last month s/p MAB infusion, HLD, GERD, admitted November 2022 with bacterial pneumonia  63 yo M with a history of COPD on PRN O2 at night (35 pack year smoking, quit August 2022), hx of DVT no longer on xarelto (for past mo.), COVID pneumonia Oct 2022 with admission for bacterial pneumonia Nov 2022,  presents from home with fever up to 101.9F and coughing all night with productive phlegm this AM. Pt also checked oxygen levels with home pulse ox and he was 78% on RA.    Pt wakes up at night frequently dyspneic. Wife reports that pt's O2 level is usually 88-92% at rest, but he becomes hypoxic with ambulation.    He has a pulmonologist through [facility], Dr. Wharton, that he has seen recently w/ recent PFTs.    ED course:  WBC up to 23k  1 gram ceftriaxone, 500 mg azithromycin   61 yo M with a history of COPD on PRN O2 at night (35 pack year smoking, quit August 2022), hx of DVT no longer on xarelto (for past mo.), COVID pneumonia Oct 2022 with admission for bacterial pneumonia Nov 2022,  presents from home after coughing all night with productive phlegm this AM. Pt also checked oxygen levels with home pulse ox and he was 78% on RA. Wife checked temp this AM and it was elevated to 101.9F. She noted he was weak/shaky.    Pt wakes up at night frequently dyspneic. Wife reports that pt's O2 level is usually 88-92% at rest, but he becomes hypoxic with ambulation.    He has a pulmonologist through [facility], Dr. Wharton, that he has seen recently w/ recent PFTs.    ED course:  WBC up to 23k  1 gram ceftriaxone, 500 mg azithromycin

## 2023-01-13 NOTE — ED ADULT TRIAGE NOTE - HAVE YOU HAD COVID IN THE LAST 60 DAYS?
Here for UDS. 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 Went through PFT 12 sessions and was sent to [facility] for further evaluation and continues with incontinence. Testing ordered for possible AUS placement. Using 4 pads per day, nocturia 2x, morning pad is wet, not soaked. Voids every hour. He has tried meds for this and none of these meds have improved his symptoms. Uroflow: voided 11 ml but did not elicit recording. Straight cathed for PVR- 3 ml    Fill rate: 50 ml/min  1st sensation: 91 ml  1st desire: 128 ml  Strong desire: 174 ml  Capacity: 227 ml     Vided 165 ml, urethral cath removed, wa sable to empty more, stream was intermittent but more stronger. Stress at 130 ml and 175 ml- both lead to small droplets of leak. PVR approx 59 ml. Resistance met at the lower portion of the penis with placing catheters. Lidocaine used to place urethral cath for testing/filling- some bleeding noted with cath placement. Encouraged to drink fluids and call with any questions.
No

## 2023-01-13 NOTE — H&P ADULT - NSHPPHYSICALEXAM_GEN_ALL_CORE
T(C): 37.3 (01-13-23 @ 12:08), Max: 37.3 (01-13-23 @ 12:08)  HR: 99 (01-13-23 @ 14:40) (91 - 101)  BP: 124/69 (01-13-23 @ 14:40) (124/69 - 136/85)  RR: 16 (01-13-23 @ 14:40) (16 - 24)  SpO2: 97% (01-13-23 @ 14:40) (90% - 97%)  Wt(kg): --Vital Signs Last 24 Hrs  T(C): 37.3 (13 Jan 2023 12:08), Max: 37.3 (13 Jan 2023 12:08)  T(F): 99.2 (13 Jan 2023 12:08), Max: 99.2 (13 Jan 2023 12:08)  HR: 99 (13 Jan 2023 14:40) (91 - 101)  BP: 124/69 (13 Jan 2023 14:40) (124/69 - 136/85)  BP(mean): 86 (13 Jan 2023 14:40) (86 - 96)  RR: 16 (13 Jan 2023 14:40) (16 - 24)  SpO2: 97% (13 Jan 2023 14:40) (90% - 97%)    Parameters below as of 13 Jan 2023 14:40  Patient On (Oxygen Delivery Method): room air        PHYSICAL EXAM:  GENERAL: NAD, well-groomed, well-developed  HEAD:  Atraumatic, Normocephalic  EYES: EOMI, PERRLA, conjunctiva and sclera clear  ENMT: No tonsillar erythema, exudates, or enlargement; Moist mucous membranes, Good dentition, No lesions  NECK: Supple, No JVD, Normal thyroid  NERVOUS SYSTEM:  Alert & Oriented X3, Good concentration; Motor Strength 5/5 B/L upper and lower extremities  CHEST/LUNG: Clear to auscultation bilaterally; No rales, rhonchi, wheezing, or rubs  HEART: Regular rate and rhythm; No murmurs, rubs, or gallops  ABDOMEN: Soft, Nontender, Nondistended; Bowel sounds present  EXTREMITIES:  intact Peripheral Pulses, No clubbing, cyanosis, or edema  LYMPH: No lymphadenopathy noted  SKIN: No rashes or lesions

## 2023-01-13 NOTE — ED PROVIDER NOTE - CLINICAL SUMMARY MEDICAL DECISION MAKING FREE TEXT BOX
62-year-old male history of COPD with a 35-pack-year smoking history, quit August 2022, history of DVT previously on Xarelto which was discontinued about 1 month ago, COVID-pneumonia in October and pneumonia in December, hyperlipidemia, GERD presents to the emergency department for fever of 101, chills, shortness of breath and coughing.  Patient has COPD but uses oxygen only if he wakes up in the night short of breath or as needed.  He has a rescue inhaler that is used as needed as well.  Patient states that his sputum is dark brown and is concerned about whether it is blood.     No chest pain, no nausea or vomiting, no dizziness or diarrhea.  No numbness or tingling.  No body aches or headache.  No known sick contacts.    Exam as stated.  Consider pneumonia, consider COVID infection, consider PE.  Will manage by initiating sepsis protocol however will hold fluids since patient has shortness of breath, hypoxia and lower extremity edema which could be a concern for CHF exacerbation.  Fluid overload status will be a contraindication to sepsis fluids.  Will reassess after results.  We will continue with early goal-directed antibiotic therapy. 62-year-old male history of COPD with a 35-pack-year smoking history, quit August 2022, history of DVT previously on Xarelto which was discontinued about 1 month ago, COVID-pneumonia in October and pneumonia in December, hyperlipidemia, GERD presents to the emergency department for fever of 101, chills, shortness of breath and coughing.  Patient has COPD but uses oxygen only if he wakes up in the night short of breath or as needed.  He has a rescue inhaler that is used as needed as well.  Patient states that his sputum is dark brown and is concerned about whether it is blood.     No chest pain, no nausea or vomiting, no dizziness or diarrhea.  No numbness or tingling.  No body aches or headache.  No known sick contacts.    Exam as stated.  Consider pneumonia, consider COVID infection, consider PE.  Will manage by initiating sepsis protocol however will hold fluids since patient has shortness of breath, hypoxia and lower extremity edema which could be a concern for CHF exacerbation.  Fluid overload status will be a contraindication to sepsis fluids.  Will reassess after results.  We will continue with early goal-directed antibiotic therapy.    Found to have Left upper and lower lobe pna. WBC 23K. Will admit. D/W Hospitalist. Lactate normal. Will add Procalcitonin.

## 2023-01-13 NOTE — ED PROVIDER NOTE - SEVERE SEPSIS CRITERIA MET YN (MLM)
Problem: Occupational Therapy Goal  Goal: Occupational Therapy Goal  Goals to be met by: 7/10     Patient will increase functional independence with ADLs by performing:    UE Dressing with [place].  LE Dressing with Contact Guard Assistance.  Grooming while seated with [place].  Toileting from toilet with Stand-by Assistance for hygiene and clothing management.      Outcome: Ongoing (interventions implemented as appropriate)  Con't POC.    YUE Story         Sepsis Criteria were met:

## 2023-01-13 NOTE — ED ADULT NURSE NOTE - NSICDXPASTMEDICALHX_GEN_ALL_CORE_FT
PAST MEDICAL HISTORY:  Basal cell carcinoma removed    Bronchitis winter 2014    DVT (deep venous thrombosis) "right lower extremity,after meniscus repair  08/2014 & Had Hemato consult (Prothrombin gene mutation study was normal+LA),treated with Xarelto" & currently on xarelto.    GERD (gastroesophageal reflux disease)     Hyperlipidemia     Pneumonia      PAST MEDICAL HISTORY:  Basal cell carcinoma removed    Bronchitis winter 2014    COPD (chronic obstructive pulmonary disease)     DVT (deep venous thrombosis) "right lower extremity,after meniscus repair  08/2014 & Had Hemato consult (Prothrombin gene mutation study was normal+LA),treated with Xarelto" & currently on xarelto.    GERD (gastroesophageal reflux disease)     Hyperlipidemia     Pneumonia

## 2023-01-13 NOTE — ED PROVIDER NOTE - OBJECTIVE STATEMENT
62-year-old male history of COPD with a 35-pack-year smoking history, quit August 2022, history of DVT previously on Xarelto which was discontinued about 1 month ago, COVID-pneumonia in October and pneumonia in December, hyperlipidemia, GERD presents to the emergency department for fever of 101, chills, shortness of breath and coughing.  Patient has COPD but uses oxygen only if he wakes up in the night short of breath or as needed.  He has a rescue inhaler that is used as needed as well.  Patient states that his sputum is dark brown and is concerned about whether it is blood.     No chest pain, no nausea or vomiting, no dizziness or diarrhea.  No numbness or tingling.  No body aches or headache.  No known sick contacts.

## 2023-01-13 NOTE — PATIENT PROFILE ADULT - FALL HARM RISK - UNIVERSAL INTERVENTIONS
Bed in lowest position, wheels locked, appropriate side rails in place/Call bell, personal items and telephone in reach/Instruct patient to call for assistance before getting out of bed or chair/Non-slip footwear when patient is out of bed/[place] to call system/Physically safe environment - no spills, clutter or unnecessary equipment/Purposeful Proactive Rounding/Room/bathroom lighting operational, light cord in reach

## 2023-01-13 NOTE — ED ADULT NURSE NOTE - OBJECTIVE STATEMENT
pt came in from home with c/o fever of 101 at home, chills, shortness of breath and coughing since last night.  Patient has COPD but uses 3LNC of oxygen only if he wakes up in the night short of breath or as needed.  He has a rescue inhaler that is used as needed as well.  Patient states that his sputum is dark brown and is concerned about whether it is blood. pt with PMH of COPD with a 35-pack-year smoking history, quit August 2022, history of DVT previously on Xarelto which was discontinued about 1 month ago, COVID-pneumonia in October and pneumonia in December, hyperlipidemia, GERD. pt placed on 3LNC on arrival to ED with O2 sat 100%.

## 2023-01-13 NOTE — H&P ADULT - ASSESSMENT
CTA chest showing no evidence of PE, but JANNIE and LLL pneumonia 63 YO M with COPD due to prior severe tobacco abuse, presents from home w/ one day of coughing, fever, chills, hypoxia down to 78%     with fever, chills, tachycardia, hypoxemia.    63 yo M with a history of COPD on PRN O2 at night (35 pack year smoking, quit August 2022), hx of DVT no longer on xarelto (for past mo.), COVID pneumonia Oct 2022 with admission for bacterial pneumonia Nov 2022,  presents from home with fever up to 101.9F and coughing all night with productive phlegm this AM. Pt also checked oxygen levels with home pulse ox and he was 78% on RA.    Pt wakes up at night frequently dyspneic. Wife reports that pt's O2 level is usually 88-92% at rest, but he becomes hypoxic with ambulation.    He has a pulmonologist through [facility], Dr. Wharton, that he has seen recently w/ recent PFTs.    ED course:  WBC up to 23k  1 gram ceftriaxone, 500 mg azithromycin      # Acute hypoxic respiratory failure and Severe sepsis 2/2 Bacterial pneumonia, likely gram negative  WBC 23k, fever up to 101.9F, and tachycardia w/ hypoxia.  CTA chest showing no evidence of PE, but shows JANNIE and LLL pneumonia. CTA chest performed 11/24/22 showing JANNIE and LLL ground glass opacities (same location). 3 episodes bacterial pneumonia since Aug 2022.  - pt recently on a course of ceftriaxone/azithro 11/25/2022, will start cefepime  - f/u procalcitonin  - f/u urine strep pneumo ag, legionella  - f/u BCx x2, Sputum Cx  - f/u MRSA/MSSA nares  - antitussives  - Pulmonary consulted, discussed case w/ Dr. Bass, who will see pt tomorrow.    # Severe COPD  - on trelegy and PRN albuterol at home, start symbicort/tiotropium here  - duonebs, PRN albuterol  - incentive spirometry    # GERD  - on omeprazole at home, cont pantoprazole    # Osteoarthritis  - pt is on meloxicam at home, cont celecoxib here.    # DVT, no longer on xarelto  - no swelling in the legs today    code status: full code  med rec: performed w/ pt list at bedside  contact: Brianda (wife) at bedside [phone]   63 YO M with COPD on PRN O2 at night due to prior severe tobacco abuse, 3 recent admissions  for pneumonia, presents from home w/ one day of coughing, fever, chills, hypoxia down to 78%.    # Acute hypoxic respiratory failure and Severe sepsis 2/2 Bacterial pneumonia, likely gram negative  -WBC 23k, fever up to 101.9F, tachypnea 24, hypoxia. Received ceftriaxone/azithro in ED. Fluid restrictive strategy used as ED noted possible trace LE edema and lactate wnl.  -CTA chest showing no evidence of PE, but shows JANNIE and LLL pneumonia. CTA chest performed 11/24/22 showing JANNIE and LLL ground glass opacities (same location). Multiple admissions for pneumonia since Aug 2022.  - pt recently on a course of ceftriaxone/azithro 11/25/2022, will start cefepime  - f/u procalcitonin  - f/u urine strep pneumo ag, legionella  - f/u BCx x2, Sputum Cx  - f/u MRSA/MSSA nares  - antitussives  - Pulmonary consulted, discussed case w/ Dr. Bass, who will see pt tomorrow.  - continuous pulse ox, telemetry    # COPD  - no wheezing on exam  - on trelegy and PRN albuterol at home, start symbicort/tiotropium here  - duonebs (unable to order standing), PRN albuterol  - incentive spirometry  - f/u pulm recs    # GERD  - on omeprazole at home, cont pantoprazole    # Osteoarthritis  - pt is on meloxicam at home, cont celecoxib here.    # DVT, no longer on xarelto  - no swelling in the legs today    # HLD  - cont home atorvastatin    code status: full code  med rec: performed w/ pt list at bedside  contact: Brianda (wife) at bedside [phone]  dvt ppx: lovenox 61 YO M with COPD on PRN O2 at night due to prior severe tobacco abuse, 3 recent admissions  for pneumonia, presents from home w/ one day of coughing, fever, chills, hypoxia down to 78%.    # Acute hypoxic respiratory failure and Severe sepsis 2/2 Bacterial pneumonia, likely gram negative  -WBC 23k, fever up to 101.9F, tachypnea 24, hypoxia. Received ceftriaxone/azithro in ED. Fluid restrictive strategy used as ED noted possible trace LE edema and lactate wnl.  -CTA chest showing no evidence of PE, but shows JANNIE and LLL pneumonia. CTA chest performed 11/24/22 showing JANNIE and LLL ground glass opacities (same location). Multiple admissions for pneumonia since Aug 2022.  - pt recently on a course of ceftriaxone/azithro 11/25/2022, will start cefepime  - f/u procalcitonin  - f/u urine strep pneumo ag, legionella  - f/u BCx x2, Sputum Cx  - f/u MRSA/MSSA nares  - antitussives  - Pulmonary consulted, discussed case w/ Dr. Bass, who will see pt tomorrow.  - continuous pulse ox, telemetry    # COPD  - no wheezing on exam  - on trelegy and PRN albuterol at home, start symbicort/tiotropium here  - duonebs (unable to order standing), PRN albuterol  - incentive spirometry  - f/u pulm recs    # Nocturnal hypoxia  - recommended that pt get OP sleep study    # GERD  - on omeprazole at home, cont pantoprazole    # Osteoarthritis  - pt is on meloxicam at home, cont celecoxib here.    # DVT, no longer on xarelto  - no swelling in the legs today    # HLD  - cont home atorvastatin    # High risk obesity  - recommend nutrition consult and weight loss.     code status: full code  med rec: performed w/ pt list at bedside  contact: Brianda (wife) at bedside [phone]  dvt ppx: lovenox

## 2023-01-13 NOTE — ED PROVIDER NOTE - PHYSICAL EXAMINATION
General:     NAD, well-nourished, well-appearing  Eyes: PERRL  Head:     NC/AT, EOMI, oral mucosa moist  Neck:     trachea midline  Lungs:     CTA b/l, decreased breath sounds bilateral.  Left worse than right at the base.  O2 sat on room air is 91 to 92%.  Improved to 95% with 3 L nasal cannula.  CVS:     RRR  Abd:     +BS, s/nt/nd  Ext:   no deformities bilateral 1+ pitting edema to the lower extremities.,  No calf tenderness   Neuro: AAOx3, no sensory/motor deficits  Skin: No rash or injuries noted

## 2023-01-13 NOTE — ED ADULT TRIAGE NOTE - CHIEF COMPLAINT QUOTE
Pt found to have 101.9 fever, shortness of breath, coughing.    Pt has COPD and is on oxygen at home.

## 2023-01-13 NOTE — H&P ADULT - NSHPLABSRESULTS_GEN_ALL_CORE
LABS:                        14.0   23.05 )-----------( 231      ( 13 Jan 2023 12:40 )             43.4     01-13    138  |  105  |  16  ----------------------------<  103<H>  4.6   |  27  |  1.03    Ca    9.2      13 Jan 2023 12:40    TPro  7.0  /  Alb  3.7  /  TBili  0.4  /  DBili  x   /  AST  20  /  ALT  39  /  AlkPhos  82  01-13    PT/INR - ( 13 Jan 2023 12:40 )   PT: 12.3 sec;   INR: 1.06 ratio         PTT - ( 13 Jan 2023 12:40 )  PTT:25.7 sec     CAPILLARY BLOOD GLUCOSE        RADIOLOGY & ADDITIONAL TESTS:    < from: CT Angio Chest PE Protocol w/ IV Cont (01.13.23 @ 14:40) >    IMPRESSION:  Left upper and lower lobe pneumonia.  No evidence of pulmonary emboli.    < end of copied text >    < from: TTE Echo Complete w/o Contrast w/ Doppler (08.11.22 @ 08:23) >    Summary:   1. Left ventricular ejection fraction, by visual estimation, is 55 to   60%.   2. Normal global left ventricular systolic function.   3. Normal left ventricular internal cavity size.   4. Spectral Doppler shows impaired relaxation pattern of left   ventricular myocardial filling (GradeI diastolic dysfunction).   5. There is mild asymmetric left ventricular hypertrophy.   6. Mildly enlarged right atrium.   7. Moderately enlarged left atrium.   8. Trace mitral valve regurgitation.   9. Sclerotic aortic valve with normal opening.    < end of copied text >        Consultant(s) Notes Reviewed:  [x ] YES  [ ] NO  Care Discussed with Consultants/Other Providers [ x] YES  [ ] NO  Imaging Personally Reviewed:  [ x] YES  [ ] NO  Prior records reviewed and summed up: [ x] YES [ ] No LABS:                        14.0   23.05 )-----------( 231      ( 13 Jan 2023 12:40 )             43.4     01-13    138  |  105  |  16  ----------------------------<  103<H>  4.6   |  27  |  1.03    Ca    9.2      13 Jan 2023 12:40    TPro  7.0  /  Alb  3.7  /  TBili  0.4  /  DBili  x   /  AST  20  /  ALT  39  /  AlkPhos  82  01-13    PT/INR - ( 13 Jan 2023 12:40 )   PT: 12.3 sec;   INR: 1.06 ratio         PTT - ( 13 Jan 2023 12:40 )  PTT:25.7 sec     CAPILLARY BLOOD GLUCOSE        RADIOLOGY & ADDITIONAL TESTS:    < from: CT Angio Chest PE Protocol w/ IV Cont (01.13.23 @ 14:40) >    IMPRESSION:  Left upper and lower lobe pneumonia.  No evidence of pulmonary emboli.    < end of copied text >    < from: CT Angio Chest PE Protocol w/ IV Cont (11.24.22 @ 12:23) >      IMPRESSION:    1.  No pulmonary embolism.  2.  Left upper and lower lobe ground glass opacities. These can be   secondary to the reported history of atypical viral infection, the   findings are nonspecific and a follow-up is suggested in 8-12 weeks for   resolution/change.    < end of copied text >        < from: TTE Echo Complete w/o Contrast w/ Doppler (08.11.22 @ 08:23) >    Summary:   1. Left ventricular ejection fraction, by visual estimation, is 55 to   60%.   2. Normal global left ventricular systolic function.   3. Normal left ventricular internal cavity size.   4. Spectral Doppler shows impaired relaxation pattern of left   ventricular myocardial filling (GradeI diastolic dysfunction).   5. There is mild asymmetric left ventricular hypertrophy.   6. Mildly enlarged right atrium.   7. Moderately enlarged left atrium.   8. Trace mitral valve regurgitation.   9. Sclerotic aortic valve with normal opening.    < end of copied text >        Consultant(s) Notes Reviewed:  [x ] YES  [ ] NO  Care Discussed with Consultants/Other Providers [ x] YES  [ ] NO  Imaging Personally Reviewed:  [ x] YES  [ ] NO  Prior records reviewed and summed up: [ x] YES [ ] No

## 2023-01-14 LAB
ALBUMIN SERPL ELPH-MCNC: 3.5 G/DL — SIGNIFICANT CHANGE UP (ref 3.3–5)
ALP SERPL-CCNC: 86 U/L — SIGNIFICANT CHANGE UP (ref 40–120)
ALT FLD-CCNC: 27 U/L — SIGNIFICANT CHANGE UP (ref 10–45)
ANION GAP SERPL CALC-SCNC: 9 MMOL/L — SIGNIFICANT CHANGE UP (ref 5–17)
AST SERPL-CCNC: 17 U/L — SIGNIFICANT CHANGE UP (ref 10–40)
BILIRUB SERPL-MCNC: 0.4 MG/DL — SIGNIFICANT CHANGE UP (ref 0.2–1.2)
BUN SERPL-MCNC: 12 MG/DL — SIGNIFICANT CHANGE UP (ref 7–23)
CALCIUM SERPL-MCNC: 8.8 MG/DL — SIGNIFICANT CHANGE UP (ref 8.4–10.5)
CHLORIDE SERPL-SCNC: 103 MMOL/L — SIGNIFICANT CHANGE UP (ref 96–108)
CO2 SERPL-SCNC: 25 MMOL/L — SIGNIFICANT CHANGE UP (ref 22–31)
CREAT SERPL-MCNC: 0.94 MG/DL — SIGNIFICANT CHANGE UP (ref 0.5–1.3)
CULTURE RESULTS: SIGNIFICANT CHANGE UP
EGFR: 91 ML/MIN/1.73M2 — SIGNIFICANT CHANGE UP
GLUCOSE SERPL-MCNC: 126 MG/DL — HIGH (ref 70–99)
HCT VFR BLD CALC: 40.6 % — SIGNIFICANT CHANGE UP (ref 39–50)
HGB BLD-MCNC: 13.2 G/DL — SIGNIFICANT CHANGE UP (ref 13–17)
LEGIONELLA AG UR QL: NEGATIVE — SIGNIFICANT CHANGE UP
MCHC RBC-ENTMCNC: 30.1 PG — SIGNIFICANT CHANGE UP (ref 27–34)
MCHC RBC-ENTMCNC: 32.5 GM/DL — SIGNIFICANT CHANGE UP (ref 32–36)
MCV RBC AUTO: 92.5 FL — SIGNIFICANT CHANGE UP (ref 80–100)
MRSA PCR RESULT.: SIGNIFICANT CHANGE UP
NRBC # BLD: 0 /100 WBCS — SIGNIFICANT CHANGE UP (ref 0–0)
PLATELET # BLD AUTO: 219 K/UL — SIGNIFICANT CHANGE UP (ref 150–400)
POTASSIUM SERPL-MCNC: 4.1 MMOL/L — SIGNIFICANT CHANGE UP (ref 3.5–5.3)
POTASSIUM SERPL-SCNC: 4.1 MMOL/L — SIGNIFICANT CHANGE UP (ref 3.5–5.3)
PROT SERPL-MCNC: 6.9 G/DL — SIGNIFICANT CHANGE UP (ref 6–8.3)
RAPID RVP RESULT: SIGNIFICANT CHANGE UP
RBC # BLD: 4.39 M/UL — SIGNIFICANT CHANGE UP (ref 4.2–5.8)
RBC # FLD: 13.6 % — SIGNIFICANT CHANGE UP (ref 10.3–14.5)
S AUREUS DNA NOSE QL NAA+PROBE: SIGNIFICANT CHANGE UP
SARS-COV-2 RNA SPEC QL NAA+PROBE: SIGNIFICANT CHANGE UP
SODIUM SERPL-SCNC: 137 MMOL/L — SIGNIFICANT CHANGE UP (ref 135–145)
SPECIMEN SOURCE: SIGNIFICANT CHANGE UP
WBC # BLD: 21.59 K/UL — HIGH (ref 3.8–10.5)
WBC # FLD AUTO: 21.59 K/UL — HIGH (ref 3.8–10.5)

## 2023-01-14 RX ADMIN — CELECOXIB 200 MILLIGRAM(S): 200 CAPSULE ORAL at 13:02

## 2023-01-14 RX ADMIN — CEFEPIME 100 MILLIGRAM(S): 1 INJECTION, POWDER, FOR SOLUTION INTRAMUSCULAR; INTRAVENOUS at 05:24

## 2023-01-14 RX ADMIN — ENOXAPARIN SODIUM 40 MILLIGRAM(S): 100 INJECTION SUBCUTANEOUS at 21:50

## 2023-01-14 RX ADMIN — CEFEPIME 100 MILLIGRAM(S): 1 INJECTION, POWDER, FOR SOLUTION INTRAMUSCULAR; INTRAVENOUS at 21:50

## 2023-01-14 RX ADMIN — Medication 100 MILLIGRAM(S): at 14:32

## 2023-01-14 RX ADMIN — BUDESONIDE AND FORMOTEROL FUMARATE DIHYDRATE 2 PUFF(S): 160; 4.5 AEROSOL RESPIRATORY (INHALATION) at 08:52

## 2023-01-14 RX ADMIN — PANTOPRAZOLE SODIUM 40 MILLIGRAM(S): 20 TABLET, DELAYED RELEASE ORAL at 05:24

## 2023-01-14 RX ADMIN — Medication 100 MILLIGRAM(S): at 21:50

## 2023-01-14 RX ADMIN — ATORVASTATIN CALCIUM 20 MILLIGRAM(S): 80 TABLET, FILM COATED ORAL at 21:50

## 2023-01-14 RX ADMIN — TIOTROPIUM BROMIDE 2 PUFF(S): 18 CAPSULE ORAL; RESPIRATORY (INHALATION) at 08:52

## 2023-01-14 RX ADMIN — Medication 100 MILLIGRAM(S): at 05:24

## 2023-01-14 RX ADMIN — CEFEPIME 100 MILLIGRAM(S): 1 INJECTION, POWDER, FOR SOLUTION INTRAMUSCULAR; INTRAVENOUS at 14:32

## 2023-01-14 RX ADMIN — BUDESONIDE AND FORMOTEROL FUMARATE DIHYDRATE 2 PUFF(S): 160; 4.5 AEROSOL RESPIRATORY (INHALATION) at 20:14

## 2023-01-14 NOTE — CONSULT NOTE ADULT - ASSESSMENT
Assessment  1. Recurrent Left sided pneumonia  2. Ex Smoker  3. H/o COVID  4. Hypoxemia with chronic home o2 3L PRN  5. [place] GERD, High chol, h/o DVT, COPD    Plan  Continue couse of IV antibiotics  Check RVP   NOT wheezing unsure of active COPD Exacerbation  continue bronchodilators  Symbicort, Spiriva while in hospital plan to start Trelegy   n/c o2 to maintain sat  check sputum culture  as patient has recurrent left sided infiltrate and clinically patient appears to h have pneumonia will need repeat ct chest in short term to r/o underlying malignancy    Patient states wants to f/u with me as out patient and has appointment on 1/23/23  d/w CARLOS Garay

## 2023-01-14 NOTE — CONSULT NOTE ADULT - SUBJECTIVE AND OBJECTIVE BOX
PULMONARY CONSULT  Location of Patient :  TELE 0226 W1 ( TELE)  Attending :Jasiel Barry      Initial HPI on admission:  HPI:  62 year old male with a history of COPD on PRN O2 at night (35 pack year smoking, quit 2022), hx of DVT no longer on Xarelto (for past mo.), COVID pneumonia Oct 2022 with admission for bacterial pneumonia 2022,  presents from home after coughing all night with productive phlegm this AM. Pt also checked oxygen levels with home pulse ox and he was 78% on RA. Wife checked temp this AM and it was elevated to 101.9F. She noted he was weak/shaky.    Pt wakes up at night frequently dyspneic. Wife reports that pt's O2 level is usually 88-92% at rest, but he becomes hypoxic with ambulation.    He has a pulmonologist through [facility], Dr. Wharton, that he has seen recently w/ recent PFTs.    ED course:  WBC up to 23k  1 gram ceftriaxone, 500 mg azithromycin   (2023 15:58)    OLD HPI    62 year old obese male Active smoker with history of  HLD, GERD, hx of DVT on Xarelto post knee surgery, suspected COPD as still smokes 1ppd who  presents to the ED on 22 for worsening dyspnea.    Pt states he has been having dyspnea and cough  for about 3 weeks.  He went to urgent care ctr 3 weeks ago, was prescribed, antibx, Albuterol prn and short course of Prednisone, with sl improvement.  But symptoms recurred again this week, states cough occ prod with greenish phlegm and last night, dyspnea worsened, was unable to sleep.  This evening, checked his O2 sat and it was 87% on room air, symptoms were persistent so decided to come to the ED.  He denies fever, chest pain, nausea, vomiting, abd pain. Pt was placed on O2 2 LPM via NC, O2 sat now 95%. Pt received Decadron 6 mg IVP, Albuterol x 2 in the ED.  (09 Aug 2022 19:42)      62 year old obese male Active smoker with history of  HLD, GERD, hx of DVT on Xarelto post knee surgery, suspected COPD as still smokes 1ppd who  presents to the ED on 22 for worsening dyspnea.    Pt states he has been having dyspnea and cough  for about 3 weeks.  He went to urgent care ctr 3 weeks ago, was prescribed, antibx, Albuterol prn and short course of Prednisone, with sl improvement.  But symptoms recurred again this week, states cough occ prod with greenish phlegm and last night, dyspnea worsened, was unable to sleep.  This evening, checked his O2 sat and it was 87% on room air, symptoms were persistent so decided to come to the ED.  He denies fever, chest pain, nausea, vomiting, abd pain. Pt was placed on O2 2 LPM via NC, O2 sat now 95%. Pt received Decadron 6 mg IVP, Albuterol x 2 in the ED.  (09 Aug 2022 19:42)    BRIEF HOSPITAL COURSE: ***    PAST MEDICAL & SURGICAL HISTORY:  GERD (gastroesophageal reflux disease)  Bronchitis winter 2014  Pneumonia   Basal cell carcinoma removed  Hyperlipidemia   DVT (deep venous thrombosis) &quot;right lower extremity,after meniscus repair  2014 &amp; Had Hemato consult (Prothrombin gene mutation study was normal+LA),treated with Xarelto&quot; &amp; currently on xarelto.  COPD (chronic obstructive pulmonary disease)   S/P laminectomy   S/P knee surgery  left meniscus -right meniscus  H/O total knee replacement, left 2016    Allergies  No Known Allergies      FAMILY HISTORY:  Family history of Alzheimer&#x27;s disease (Mother)    Family history of heart attack (Father)  father at age 65      Social history: Social History:  Prior severe tobacco abuse, social drinker, no drug use. (2023 15:58)       Smoking:     Drinking:     Drug use:    Review of Systems: as stated above    CONSTITUTIONAL: No fever, No chills, No fatigue  EYES: No eye pain, No visual disturbances, No discharge  ENMT:  No difficulty hearing, No tinnitus, No vertigo; No sinus or throat pain  NECK: No pain, No stiffness  RESPIRATORY: No Cough, No SOB, No Secretions  CARDIOVASCULAR: No chest pain, No palpitations, No dizziness, or No leg swelling  GASTROINTESTINAL: No abdominal or epigastric pain. No nausea, No vomiting, No hematemesis; No diarrhea, No constipation. No melena, No hematochezia.  GENITOURINARY: No dysuria, No frequency, No hematuria, No incontinence  NEUROLOGICAL: No headaches, No memory loss, No loss of strength, No numbness, No tremors  SKIN: No itching, No burning, No rashes, No lesions   MUSCULOSKELETAL: No joint pain or swelling; No muscle, back, No extremity pain  PSYCHIATRIC: No depression, No anxiety, No mood swings, No difficulty sleeping      Medications:  MEDICATIONS  (STANDING):  atorvastatin 20 milliGRAM(s) Oral at bedtime  benzonatate 100 milliGRAM(s) Oral every 8 hours  budesonide 160 MICROgram(s)/formoterol 4.5 MICROgram(s) Inhaler 2 Puff(s) Inhalation two times a day  cefepime   IVPB 1000 milliGRAM(s) IV Intermittent every 8 hours  celecoxib 200 milliGRAM(s) Oral daily  enoxaparin Injectable 40 milliGRAM(s) SubCutaneous every 24 hours  pantoprazole    Tablet 40 milliGRAM(s) Oral before breakfast  tiotropium 2.5 MICROgram(s) Inhaler 2 Puff(s) Inhalation daily    MEDICATIONS  (PRN):  acetaminophen     Tablet .. 650 milliGRAM(s) Oral every 6 hours PRN Temp greater or equal to 38C (100.4F), Mild Pain (1 - 3)  albuterol    90 MICROgram(s) HFA Inhaler 2 Puff(s) Inhalation every 6 hours PRN Shortness of Breath and/or Wheezing  aluminum hydroxide/magnesium hydroxide/simethicone Suspension 30 milliLiter(s) Oral every 4 hours PRN Dyspepsia  guaifenesin/dextromethorphan Oral Liquid 10 milliLiter(s) Oral every 4 hours PRN Cough  melatonin 3 milliGRAM(s) Oral at bedtime PRN Insomnia  ondansetron Injectable 4 milliGRAM(s) IV Push every 8 hours PRN Nausea and/or Vomiting      Antibiotics History  azithromycin  IVPB 500 milliGRAM(s) IV Intermittent once, 23 @ 12:40, Stop order after: 1 Doses  cefepime   IVPB 1000 milliGRAM(s) IV Intermittent every 8 hours, 23 @ 17:00, Stop order after: 7 Days  cefTRIAXone   IVPB 1000 milliGRAM(s) IV Intermittent once, 23 @ 12:40, Stop order after: 1 Doses      Heme Medications   enoxaparin Injectable 40 milliGRAM(s) SubCutaneous every 24 hours, 23 @ 18:03      GI Medications  aluminum hydroxide/magnesium hydroxide/simethicone Suspension 30 milliLiter(s) Oral every 4 hours, 23 @ 17:24, Routine PRN  pantoprazole    Tablet 40 milliGRAM(s) Oral before breakfast, 23 @ 17:17, Routine        Home Medications:  Last Order Reconciliation Date: 23 @ 17:18 (Admission Reconciliation)  albuterol 90 mcg/inh inhalation aerosol: 2 puff(s) inhaled every 6 hours, As needed, Shortness of Breath and/or Wheezing (22 @ 14:34)  atorvastatin 20 mg oral tablet: 1 tab(s) orally once a day (at bedtime) (22 @ 14:34)  meloxicam 15 mg oral tablet: 1 tab(s) orally once a day (22 @ 14:34)  PriLOSEC 40 mg oral delayed release capsule: 1 cap(s) orally once a day (22 @ 14:34)  Trelegy Ellipta 200 mcg-62.5 mcg-25 mcg/inh inhalation powder: 1 puff(s) inhaled once a day (22 @ 14:34)      LABS:                        13.2   21.59 )-----------( 219      ( 2023 06:45 )             40.6         137  |  103  |  12  ----------------------------<  126<H>  4.1   |  25  |  0.94    Ca    8.8      2023 06:45    TPro  6.9  /  Alb  3.5  /  TBili  0.4  /  DBili  x   /  AST  17  /  ALT  27  /  AlkPhos  86      HIT ab --  @ 12:40  HIT ab EIA --  D Dimer -1280          PT/INR - ( 2023 12:40 )   PT: 12.3 sec;   INR: 1.06 ratio         PTT - ( 2023 12:40 )  PTT:25.7 sec  Urinalysis Basic - ( 2023 16:50 )    Color: Yellow / Appearance: Clear / S.005 / pH: x  Gluc: x / Ketone: Negative  / Bili: Negative / Urobili: Negative   Blood: x / Protein: 15 / Nitrite: Negative   Leuk Esterase: Negative / RBC: Negative /HPF / WBC Negative /HPF   Sq Epi: x / Non Sq Epi: Neg.-Few / Bacteria: Negative /HPF      Procalcitonin, Serum: 1.10 ng/mL (23 @ 12:40)    Serum Pro-Brain Natriuretic Peptide: 149 pg/mL (23 @ 12:40)       RADIOLOGY  CXR:      CT:  < from: CT Angio Chest PE Protocol w/ IV Cont (23 @ 14:40) >  ACC: [id number] [facility]                      PROCEDURE DATE:  2023      FINDINGS:    LUNGS AND AIRWAYS: Patent central airways.  Left upper and lower lobe infiltrates, compatible with pneumonia.  PLEURA: No pleural effusion.  MEDIASTINUM AND PAUL: No lymphadenopathy.  VESSELS: No evidence of pulmonary emboli. Coronary artery calcification.  HEART: Heart size is normal. No pericardial effusion.  CHEST WALL AND LOWER NECK: Within normal limits.  VISUALIZED UPPER ABDOMEN: Hepatic steatosis  BONES: Degenerative changes.    IMPRESSION:  Left upper and lower lobe pneumonia.  No evidence of pulmonary emboli.    < end of copied text >  < from: CT Angio Chest PE Protocol w/ IV Cont (22 @ 12:23) >  ACC: [id number] EXAM:  CT ANGIO CHEST PULM [facility]                      PROCEDURE DATE:  2022    PROCEDURE:  CT Angiogram of the chest was obtained with intravenous contrast. Three dimensional maximum intensity projection (MIP) images were generated.  FINDINGS:  PULMONARY ANGIOGRAM: No pulmonary embolism in the main, lobar, segmental or subsegmental pulmonary arteries.  LYMPH NODES: No lymphadenopathy.  HEART/VASCULATURE: Heart size within normal limits. No pericardial effusion. Calcifications of the coronary arteries and aorta.  AIRWAYS/LUNGS/PLEURA: Emphysema. Groundglass opacities within the left upper and lower lobe. Addition, there are likely subtle groundglass opacities in the right middle lobe. Linear atelectasis in the right lower lobe. No pleural effusion.  UPPER ABDOMEN: Hepatic steatosis.  BONES/SOFT TISSUES: Degenerative changes of the spine. No aggressive osseous lesion.  IMPRESSION:  1.  No pulmonary embolism.  2.  Left upper and lower lobe ground glass opacities. These can be secondary to the reported history of atypical viral infection, the findings are nonspecific and a follow-up is suggested in 8-12 weeks for resolution/change.    --- End of Report ---    < end of copied text >  < from: CT Angio Chest PE Protocol w/ IV Cont (22 @ 19:28) >    ACC: [id number] EXAM:  CT ANGIO CHEST PULM [facility]                          PROCEDURE DATE:  2022    COMPARISON: 2019.  FINDINGS:    LUNGS AND AIRWAYS: Saber-sheath trachea.  Mild paraseptal and possibly centrilobular upper lobe emphysema. Scattered foci of reticular scarring bilaterally.  PLEURA: No pleural effusion.  MEDIASTINUM AND PAUL: No lymphadenopathy.  VESSELS: No evidence of pulmonary emboli. Coronary artery calcification.  HEART: Heart size is normal. No pericardial effusion.  CHEST WALL AND LOWER NECK: Within normal limits.  VISUALIZED UPPER ABDOMEN: Within normal limits.  BONES: Degenerative changes.    IMPRESSION:  No evidence of pulmonary embolism.  Mild emphysema.  --- End of Report ---  < end of copied text >    ECHO:  < from: TTE Echo Complete w/o Contrast w/ Doppler (22 @ 08:23) >  Summary:   1. Left ventricular ejection fraction, by visual estimation, is 55 to 60%.   2. Normal global left ventricular systolic function.   3. Normal left ventricular internal cavity size.   4. Spectral Doppler shows impaired relaxation pattern of left ventricular myocardial filling (GradeI diastolic dysfunction).   5. There is mild asymmetric left ventricular hypertrophy.   6. Mildly enlarged right atrium.   7. Moderately enlarged left atrium.   8. Trace mitral valve regurgitation.   9. Sclerotic aortic valve with normal opening.  < end of copied text >      VITALS:  T(C): 36.8 (23 @ 05:43), Max: 37.3 (23 @ 12:08)  T(F): 98.3 (23 @ 05:43), Max: 99.2 (23 @ 12:08)  HR: 92 (23 @ 08:57) (86 - 101)  BP: 130/83 (23 @ 05:43) (124/69 - 149/83)  BP(mean): 86 (23 @ 14:40) (86 - 96)  ABP: --  ABP(mean): --  RR: 20 (23 @ 05:43) (16 - 24)  SpO2: 92% (01-14-23 @ 08:57) (90% - 97%)  CVP(mm Hg): --  CVP(cm H2O): --    Ins and Outs       Height (cm): 188 (23 @ 21:15)  Weight (kg): 129.3 (23 @ 21:15)  BMI (kg/m2): 36.6 (23 @ 21:15)        I&O's Detail      Physical Examination:  GENERAL:               Alert, Oriented, No acute distress.    HEENT:                    Pupils equal, reactive to light.  Symmetric. No JVD, Moist MM  PULM:                     Bilateral air entry, Clear to auscultation bilaterally, no significant sputum production, No Rales, No Rhonchi, No Wheezing  CVS:                         S1, S2,  No Murmur  ABD:                        Soft, nondistended, nontender, normoactive bowel sounds,   EXT:                         No edema, nontender, No Cyanosis or Clubbing   Vascular:                Warm Extremities, Normal Capillary refill, Normal Distal Pulses  SKIN:                       Warm and well perfused, no rashes noted.   NEURO:                  Alert, oriented, interactive, nonfocal, follows commands  PSYC:                      Calm, + Insight.     PULMONARY CONSULT  Location of Patient :  TELE 0226 W1 ( TELE)  Attending :Jasiel Barry      Initial HPI on admission:  HPI:  62 year old male with a history of COPD on PRN 3L n/c O2 at night  (35 pack year smoking, quit 2022), hx of DVT no longer on Xarelto (for past mo.), COVID pneumonia Oct 2022 with admission for bacterial pneumonia 2022,  presents from home after coughing all night with productive phlegm this AM. Pt also checked oxygen levels with home pulse ox and he was 78% on RA. Wife checked temp this AM and it was elevated to 101.9F. She noted he was weak/shaky.  he was admitted for recurrent pneumonia and started on antibiotics and feeling better    states last smoke was when i last saw him in Aug 2022.   states doing well at home taking trelegy and f/u with pulm in [facility].   [facility] has portable o2 and stationary o2, and uses it here and there    PAST MEDICAL & SURGICAL HISTORY:  GERD (gastroesophageal reflux disease)  Bronchitis winter 2014  Pneumonia   Basal cell carcinoma removed  Hyperlipidemia   DVT (deep venous thrombosis) &quot;right lower extremity,after meniscus repair  2014 &amp; Had Hemato consult (Prothrombin gene mutation study was normal+LA),treated with Xarelto&quot; &amp; currently on xarelto.  COPD (chronic obstructive pulmonary disease)   S/P laminectomy   S/P knee surgery  left meniscus -right meniscus  H/O total knee replacement, left 2016    Allergies  No Known Allergies      FAMILY HISTORY:  Family history of Alzheimer&#x27;s disease (Mother)    Family history of heart attack (Father)  father at age 65      Social history: Social History:  Prior severe tobacco abuse, social drinker, no drug use. (2023 15:58)       Review of Systems: as stated above    CONSTITUTIONAL: +fever, No chills, +fatigue  EYES: No eye pain, No visual disturbances, No discharge  ENMT:  No difficulty hearing, No tinnitus, No vertigo; No sinus or throat pain  NECK: No pain, No stiffness  RESPIRATORY: +Cough, +SOB, + thick Secretions  CARDIOVASCULAR: No chest pain, No palpitations, No dizziness, or No leg swelling  GASTROINTESTINAL: No abdominal or epigastric pain. No nausea, No vomiting, No hematemesis; No diarrhea, No constipation. No melena, No hematochezia.  GENITOURINARY: No dysuria, No frequency, No hematuria, No incontinence  NEUROLOGICAL: No headaches, No memory loss, No loss of strength, No numbness, No tremors  SKIN: No itching, No burning, No rashes, No lesions   MUSCULOSKELETAL: No joint pain or swelling; No muscle, back, No extremity pain  PSYCHIATRIC: No depression, No anxiety, No mood swings, No difficulty sleeping      Medications:  MEDICATIONS  (STANDING):  atorvastatin 20 milliGRAM(s) Oral at bedtime  benzonatate 100 milliGRAM(s) Oral every 8 hours  budesonide 160 MICROgram(s)/formoterol 4.5 MICROgram(s) Inhaler 2 Puff(s) Inhalation two times a day  cefepime   IVPB 1000 milliGRAM(s) IV Intermittent every 8 hours  celecoxib 200 milliGRAM(s) Oral daily  enoxaparin Injectable 40 milliGRAM(s) SubCutaneous every 24 hours  pantoprazole    Tablet 40 milliGRAM(s) Oral before breakfast  tiotropium 2.5 MICROgram(s) Inhaler 2 Puff(s) Inhalation daily    MEDICATIONS  (PRN):  acetaminophen     Tablet .. 650 milliGRAM(s) Oral every 6 hours PRN Temp greater or equal to 38C (100.4F), Mild Pain (1 - 3)  albuterol    90 MICROgram(s) HFA Inhaler 2 Puff(s) Inhalation every 6 hours PRN Shortness of Breath and/or Wheezing  aluminum hydroxide/magnesium hydroxide/simethicone Suspension 30 milliLiter(s) Oral every 4 hours PRN Dyspepsia  guaifenesin/dextromethorphan Oral Liquid 10 milliLiter(s) Oral every 4 hours PRN Cough  melatonin 3 milliGRAM(s) Oral at bedtime PRN Insomnia  ondansetron Injectable 4 milliGRAM(s) IV Push every 8 hours PRN Nausea and/or Vomiting      Antibiotics History  azithromycin  IVPB 500 milliGRAM(s) IV Intermittent once, 23 @ 12:40, Stop order after: 1 Doses  cefepime   IVPB 1000 milliGRAM(s) IV Intermittent every 8 hours, 23 @ 17:00, Stop order after: 7 Days  cefTRIAXone   IVPB 1000 milliGRAM(s) IV Intermittent once, 23 @ 12:40, Stop order after: 1 Doses      Heme Medications   enoxaparin Injectable 40 milliGRAM(s) SubCutaneous every 24 hours, 23 @ 18:03      GI Medications  aluminum hydroxide/magnesium hydroxide/simethicone Suspension 30 milliLiter(s) Oral every 4 hours, 23 @ 17:24, Routine PRN  pantoprazole    Tablet 40 milliGRAM(s) Oral before breakfast, 23 @ 17:17, Routine        Home Medications:  Last Order Reconciliation Date: 23 @ 17:18 (Admission Reconciliation)  albuterol 90 mcg/inh inhalation aerosol: 2 puff(s) inhaled every 6 hours, As needed, Shortness of Breath and/or Wheezing (22 @ 14:34)  atorvastatin 20 mg oral tablet: 1 tab(s) orally once a day (at bedtime) (22 @ 14:34)  meloxicam 15 mg oral tablet: 1 tab(s) orally once a day (22 @ 14:34)  PriLOSEC 40 mg oral delayed release capsule: 1 cap(s) orally once a day (22 @ 14:34)  Trelegy Ellipta 200 mcg-62.5 mcg-25 mcg/inh inhalation powder: 1 puff(s) inhaled once a day (22 @ 14:34)      LABS:                        13.2   21.59 )-----------( 219      ( 2023 06:45 )             40.6         137  |  103  |  12  ----------------------------<  126<H>  4.1   |  25  |  0.94    Ca    8.8      2023 06:45    TPro  6.9  /  Alb  3.5  /  TBili  0.4  /  DBili  x   /  AST  17  /  ALT  27  /  AlkPhos  86      HIT ab --  @ 12:40  HIT ab EIA --  D Dimer -1280          PT/INR - ( 2023 12:40 )   PT: 12.3 sec;   INR: 1.06 ratio         PTT - ( 2023 12:40 )  PTT:25.7 sec  Urinalysis Basic - ( 2023 16:50 )    Color: Yellow / Appearance: Clear / S.005 / pH: x  Gluc: x / Ketone: Negative  / Bili: Negative / Urobili: Negative   Blood: x / Protein: 15 / Nitrite: Negative   Leuk Esterase: Negative / RBC: Negative /HPF / WBC Negative /HPF   Sq Epi: x / Non Sq Epi: Neg.-Few / Bacteria: Negative /HPF      Procalcitonin, Serum: 1.10 ng/mL (23 @ 12:40)    Serum Pro-Brain Natriuretic Peptide: 149 pg/mL (23 @ 12:40)       RADIOLOGY  CXR:      CT:  < from: CT Angio Chest PE Protocol w/ IV Cont (23 @ 14:40) >  ACC: [id number] [facility]                      PROCEDURE DATE:  2023      FINDINGS:    LUNGS AND AIRWAYS: Patent central airways.  Left upper and lower lobe infiltrates, compatible with pneumonia.  PLEURA: No pleural effusion.  MEDIASTINUM AND PAUL: No lymphadenopathy.  VESSELS: No evidence of pulmonary emboli. Coronary artery calcification.  HEART: Heart size is normal. No pericardial effusion.  CHEST WALL AND LOWER NECK: Within normal limits.  VISUALIZED UPPER ABDOMEN: Hepatic steatosis  BONES: Degenerative changes.    IMPRESSION:  Left upper and lower lobe pneumonia.  No evidence of pulmonary emboli.    < end of copied text >  < from: CT Angio Chest PE Protocol w/ IV Cont (22 @ 12:23) >  ACC: [id number] EXAM:  CT ANGIO CHEST PULM [facility]                      PROCEDURE DATE:  2022    PROCEDURE:  CT Angiogram of the chest was obtained with intravenous contrast. Three dimensional maximum intensity projection (MIP) images were generated.  FINDINGS:  PULMONARY ANGIOGRAM: No pulmonary embolism in the main, lobar, segmental or subsegmental pulmonary arteries.  LYMPH NODES: No lymphadenopathy.  HEART/VASCULATURE: Heart size within normal limits. No pericardial effusion. Calcifications of the coronary arteries and aorta.  AIRWAYS/LUNGS/PLEURA: Emphysema. Groundglass opacities within the left upper and lower lobe. Addition, there are likely subtle groundglass opacities in the right middle lobe. Linear atelectasis in the right lower lobe. No pleural effusion.  UPPER ABDOMEN: Hepatic steatosis.  BONES/SOFT TISSUES: Degenerative changes of the spine. No aggressive osseous lesion.  IMPRESSION:  1.  No pulmonary embolism.  2.  Left upper and lower lobe ground glass opacities. These can be secondary to the reported history of atypical viral infection, the findings are nonspecific and a follow-up is suggested in 8-12 weeks for resolution/change.    --- End of Report ---    < end of copied text >  < from: CT Angio Chest PE Protocol w/ IV Cont (22 @ 19:28) >    ACC: [id number] EXAM:  CT ANGIO CHEST PULM [facility]                          PROCEDURE DATE:  2022    COMPARISON: 2019.  FINDINGS:    LUNGS AND AIRWAYS: Saber-sheath trachea.  Mild paraseptal and possibly centrilobular upper lobe emphysema. Scattered foci of reticular scarring bilaterally.  PLEURA: No pleural effusion.  MEDIASTINUM AND PAUL: No lymphadenopathy.  VESSELS: No evidence of pulmonary emboli. Coronary artery calcification.  HEART: Heart size is normal. No pericardial effusion.  CHEST WALL AND LOWER NECK: Within normal limits.  VISUALIZED UPPER ABDOMEN: Within normal limits.  BONES: Degenerative changes.    IMPRESSION:  No evidence of pulmonary embolism.  Mild emphysema.  --- End of Report ---  < end of copied text >    ECHO:  < from: TTE Echo Complete w/o Contrast w/ Doppler (22 @ 08:23) >  Summary:   1. Left ventricular ejection fraction, by visual estimation, is 55 to 60%.   2. Normal global left ventricular systolic function.   3. Normal left ventricular internal cavity size.   4. Spectral Doppler shows impaired relaxation pattern of left ventricular myocardial filling (GradeI diastolic dysfunction).   5. There is mild asymmetric left ventricular hypertrophy.   6. Mildly enlarged right atrium.   7. Moderately enlarged left atrium.   8. Trace mitral valve regurgitation.   9. Sclerotic aortic valve with normal opening.  < end of copied text >      VITALS:  T(C): 36.8 (23 @ 05:43), Max: 37.3 (23 @ 12:08)  T(F): 98.3 (23 @ 05:43), Max: 99.2 (23 @ 12:08)  HR: 92 (23 @ 08:57) (86 - 101)  BP: 130/83 (23 @ 05:43) (124/69 - 149/83)  BP(mean): 86 (23 @ 14:40) (86 - 96)  ABP: --  ABP(mean): --  RR: 20 (23 @ 05:43) (16 - 24)  SpO2: 92% (23 @ 08:57) (90% - 97%)  CVP(mm Hg): --  CVP(cm H2O): --    Ins and Outs       Height (cm): 188 (23 @ 21:15)  Weight (kg): 129.3 (23 @ 21:15)  BMI (kg/m2): 36.6 (23 @ 21:15)        I&O's Detail      Physical Examination:  GENERAL:               Alert, Oriented, No acute distress.    HEENT:                    No JVD, Moist MM  PULM:                     Bilateral air entry, Clear to auscultation bilaterally, no significant sputum production, trace Rales, No Rhonchi, No Wheezing  CVS:                         S1, S2,  No Murmur  ABD:                        Soft, nondistended, nontender, normoactive bowel sounds,   EXT:                         No edema, nontender, No Cyanosis or Clubbing    NEURO:                  Alert, oriented, interactive, nonfocal, follows commands  PSYC:                      Calm, + Insight.     Rate control.  Continue Lovenox.

## 2023-01-14 NOTE — PROGRESS NOTE ADULT - SUBJECTIVE AND OBJECTIVE BOX
LEYLA JAYLYN  62y  Male    patient currently complains of JACKSON. he denies chest pain      REVIEW OF SYSTEMS:    Cardiac HLD  Pulmonary pneumonia/JACKSON/COPD  GI GERD   no dysuria/hematuria  Neuro no headache/numbness/dizziness  Musculoskeletal DJD     FAMILY HISTORY:  Family history of Alzheimer&#x27;s disease (Mother)    Family history of heart attack (Father)  father at age 65      T(C): 36.8 (23 @ 05:43), Max: 37.3 (23 @ 12:08)  HR: 92 (23 @ 08:57) (86 - 101)  BP: 130/83 (23 @ 05:43) (124/69 - 149/83)  RR: 20 (23 @ 05:43) (16 - 24)  SpO2: 92% (23 @ 08:57) (90% - 97%)  Wt(kg): --Vital Signs Last 24 Hrs  T(C): 36.8 (2023 05:43), Max: 37.3 (2023 12:08)  T(F): 98.3 (2023 05:43), Max: 99.2 (2023 12:08)  HR: 92 (2023 08:57) (86 - 101)  BP: 130/83 (2023 05:43) (124/69 - 149/83)  BP(mean): 86 (2023 14:40) (86 - 96)  RR: 20 (2023 05:43) (16 - 24)  SpO2: 92% (2023 08:57) (90% - 97%)    Parameters below as of 2023 08:57  Patient On (Oxygen Delivery Method): room air      No Known Allergies      PHYSICAL EXAM:    General currently comfortable no SOB  Neck  Heart regular  Lungs Bilateral rhonchi  Abdomen non tender non distended normal bowel sounds no HSM/CVAT  Extremities non tender no edema +pulses  Neurologic alert and oriented x 3 no acute focal changes        Consultant(s) Notes Reviewed:  [x ] YES  [ ] NO  Care Discussed with Consultants/Other Providers [ x] YES  [ ] NO    LABS:  CBC Full  -  ( 2023 06:45 )  WBC Count : 21.59 K/uL  RBC Count : 4.39 M/uL  Hemoglobin : 13.2 g/dL  Hematocrit : 40.6 %  Platelet Count - Automated : 219 K/uL  Mean Cell Volume : 92.5 fl  Mean Cell Hemoglobin : 30.1 pg  Mean Cell Hemoglobin Concentration : 32.5 gm/dL  Auto Neutrophil # : x  Auto Lymphocyte # : x  Auto Monocyte # : x  Auto Eosinophil # : x  Auto Basophil # : x  Auto Neutrophil % : x  Auto Lymphocyte % : x  Auto Monocyte % : x  Auto Eosinophil % : x  Auto Basophil % : x                          13.2   21.59 )-----------( 219      ( 2023 06:45 )             40.6         137  |  103  |  12  ----------------------------<  126<H>  4.1   |  25  |  0.94    Ca    8.8      2023 06:45    TPro  6.9  /  Alb  3.5  /  TBili  0.4  /  DBili  x   /  AST  17  /  ALT  27  /  AlkPhos  86      LIVER FUNCTIONS - ( 2023 06:45 )  Alb: 3.5 g/dL / Pro: 6.9 g/dL / ALK PHOS: 86 U/L / ALT: 27 U/L / AST: 17 U/L / GGT: x           PT/INR - ( 2023 12:40 )   PT: 12.3 sec;   INR: 1.06 ratio         PTT - ( 2023 12:40 )  PTT:25.7 sec  Urinalysis Basic - ( 2023 16:50 )    Color: Yellow / Appearance: Clear / S.005 / pH: x  Gluc: x / Ketone: Negative  / Bili: Negative / Urobili: Negative   Blood: x / Protein: 15 / Nitrite: Negative   Leuk Esterase: Negative / RBC: Negative /HPF / WBC Negative /HPF   Sq Epi: x / Non Sq Epi: Neg.-Few / Bacteria: Negative /HPF    RADIOLOGY & ADDITIONAL TESTS:        < from: CT Angio Chest PE Protocol w/ IV Cont (23 @ 14:40) >  ACC: [id number] EXAM:  CT ANGIO CHEST PULM ART [facility]                          PROCEDURE DATE:  2023          INTERPRETATION:  CLINICAL INFORMATION: Cough, shortness of breath, fever.   Evaluate for PE/pneumonia.    COMPARISON: 2022.    CONTRAST/COMPLICATIONS:  IV Contrast: Omnipaque 350  90 cc administered   10 cc discarded  Oral Contrast: NONE  Complications: None reported at time of study completion    PROCEDURE:  CT Angiography of the Chest.  Sagittal and coronal reformats were performed as well as 3D (MIP)   reconstructions.    FINDINGS:    LUNGS AND AIRWAYS: Patent central airways.  Left upper and lower lobe   infiltrates, compatible with pneumonia.  PLEURA: No pleural effusion.  MEDIASTINUM AND PAUL: No lymphadenopathy.  VESSELS: No evidence of pulmonary emboli. Coronary artery calcification.  HEART: Heart size is normal. No pericardial effusion.  CHEST WALL AND LOWER NECK: Within normal limits.  VISUALIZED UPPER ABDOMEN: Hepatic steatosis  BONES: Degenerative changes.    IMPRESSION:  Left upper and lower lobe pneumonia.  No evidence of pulmonary emboli.        --- End of Report ---        MARIELOS GARZA MD; Attending Radiologist  This document has been electronically signed. 2023  3:17PM    < end of copied text >              < from: Xray Chest 1 View AP/PA (23 @ 13:38) >    ACC: [id number] EXAM:  XR CHEST AP OR PA 1V                          PROCEDURE DATE:  2023          INTERPRETATION:  Portable chest radiograph    CLINICAL INFORMATION: Sepsis    TECHNIQUE:  Portable  AP chest radiograph.    COMPARISON: 1/10/2023 chest x-ray .    FINDINGS:  CATHETERS AND TUBES: None    PULMONARY: LEFT perihilar/basilar ill-defined multifocal airspace disease.  RIGHT lung parenchyma clear. No pneumothorax.    HEART/VASCULAR: The heart and mediastinum size and configuration are   within normal limits.    BONES: Visualized osseous structures are intact.    IMPRESSION: LEFT perihilar/LEFT lung base multifocal ill-defined airspace   disease.  Please see same day chest CT report.  --- End of Report ---            JHON MARTÍNEZ MD; Attending Radiologist  This document has been electronically signed. 2023  7:58PM    < end of copied text >            < from: 12 Lead ECG (23 @ 12:40) >    Ventricular Rate 94 BPM    Atrial Rate 94 BPM    P-R Interval 172 ms    QRS Duration 100 ms    Q-T Interval 318 ms    QTC Calculation(Bazett) 397 ms    R Axis -26 degrees    T Axis 11 degrees    Diagnosis Line Normal sinus rhythm  poor baseline  Normal ECG  probably unchanged versus  09-AUG-2022 18:02,    Confirmed by NIK THOMAS, SARAH [place] () on 2023 9:50:51 AM    < end of copied text >              acetaminophen     Tablet .. 650 milliGRAM(s) Oral every 6 hours PRN  albuterol    90 MICROgram(s) HFA Inhaler 2 Puff(s) Inhalation every 6 hours PRN  aluminum hydroxide/magnesium hydroxide/simethicone Suspension 30 milliLiter(s) Oral every 4 hours PRN  atorvastatin 20 milliGRAM(s) Oral at bedtime  benzonatate 100 milliGRAM(s) Oral every 8 hours  budesonide 160 MICROgram(s)/formoterol 4.5 MICROgram(s) Inhaler 2 Puff(s) Inhalation two times a day  cefepime   IVPB 1000 milliGRAM(s) IV Intermittent every 8 hours  celecoxib 200 milliGRAM(s) Oral daily  enoxaparin Injectable 40 milliGRAM(s) SubCutaneous every 24 hours  guaifenesin/dextromethorphan Oral Liquid 10 milliLiter(s) Oral every 4 hours PRN  melatonin 3 milliGRAM(s) Oral at bedtime PRN  ondansetron Injectable 4 milliGRAM(s) IV Push every 8 hours PRN  pantoprazole    Tablet 40 milliGRAM(s) Oral before breakfast  tiotropium 2.5 MICROgram(s) Inhaler 2 Puff(s) Inhalation daily

## 2023-01-15 ENCOUNTER — TRANSCRIPTION ENCOUNTER (OUTPATIENT)
Age: 63
End: 2023-01-15

## 2023-01-15 VITALS
TEMPERATURE: 98 F | DIASTOLIC BLOOD PRESSURE: 79 MMHG | OXYGEN SATURATION: 93 % | RESPIRATION RATE: 18 BRPM | HEART RATE: 82 BPM | SYSTOLIC BLOOD PRESSURE: 126 MMHG

## 2023-01-15 LAB
ANION GAP SERPL CALC-SCNC: 9 MMOL/L — SIGNIFICANT CHANGE UP (ref 5–17)
BUN SERPL-MCNC: 12 MG/DL — SIGNIFICANT CHANGE UP (ref 7–23)
CALCIUM SERPL-MCNC: 8.9 MG/DL — SIGNIFICANT CHANGE UP (ref 8.4–10.5)
CHLORIDE SERPL-SCNC: 104 MMOL/L — SIGNIFICANT CHANGE UP (ref 96–108)
CO2 SERPL-SCNC: 27 MMOL/L — SIGNIFICANT CHANGE UP (ref 22–31)
CREAT SERPL-MCNC: 1 MG/DL — SIGNIFICANT CHANGE UP (ref 0.5–1.3)
EGFR: 85 ML/MIN/1.73M2 — SIGNIFICANT CHANGE UP
GLUCOSE SERPL-MCNC: 172 MG/DL — HIGH (ref 70–99)
GRAM STN FLD: SIGNIFICANT CHANGE UP
HCT VFR BLD CALC: 41.9 % — SIGNIFICANT CHANGE UP (ref 39–50)
HGB BLD-MCNC: 13.4 G/DL — SIGNIFICANT CHANGE UP (ref 13–17)
MCHC RBC-ENTMCNC: 30 PG — SIGNIFICANT CHANGE UP (ref 27–34)
MCHC RBC-ENTMCNC: 32 GM/DL — SIGNIFICANT CHANGE UP (ref 32–36)
MCV RBC AUTO: 93.9 FL — SIGNIFICANT CHANGE UP (ref 80–100)
MRSA PCR RESULT.: SIGNIFICANT CHANGE UP
NRBC # BLD: 0 /100 WBCS — SIGNIFICANT CHANGE UP (ref 0–0)
PLATELET # BLD AUTO: 217 K/UL — SIGNIFICANT CHANGE UP (ref 150–400)
POTASSIUM SERPL-MCNC: 3.9 MMOL/L — SIGNIFICANT CHANGE UP (ref 3.5–5.3)
POTASSIUM SERPL-SCNC: 3.9 MMOL/L — SIGNIFICANT CHANGE UP (ref 3.5–5.3)
RBC # BLD: 4.46 M/UL — SIGNIFICANT CHANGE UP (ref 4.2–5.8)
RBC # FLD: 13.6 % — SIGNIFICANT CHANGE UP (ref 10.3–14.5)
S AUREUS DNA NOSE QL NAA+PROBE: SIGNIFICANT CHANGE UP
S PNEUM AG UR QL: NEGATIVE — SIGNIFICANT CHANGE UP
SODIUM SERPL-SCNC: 140 MMOL/L — SIGNIFICANT CHANGE UP (ref 135–145)
SPECIMEN SOURCE: SIGNIFICANT CHANGE UP
WBC # BLD: 12.86 K/UL — HIGH (ref 3.8–10.5)
WBC # FLD AUTO: 12.86 K/UL — HIGH (ref 3.8–10.5)

## 2023-01-15 PROCEDURE — 85027 COMPLETE CBC AUTOMATED: CPT

## 2023-01-15 PROCEDURE — 84145 PROCALCITONIN (PCT): CPT

## 2023-01-15 PROCEDURE — 71045 X-RAY EXAM CHEST 1 VIEW: CPT

## 2023-01-15 PROCEDURE — 83880 ASSAY OF NATRIURETIC PEPTIDE: CPT

## 2023-01-15 PROCEDURE — 87086 URINE CULTURE/COLONY COUNT: CPT

## 2023-01-15 PROCEDURE — 36415 COLL VENOUS BLD VENIPUNCTURE: CPT

## 2023-01-15 PROCEDURE — 80053 COMPREHEN METABOLIC PANEL: CPT

## 2023-01-15 PROCEDURE — 85025 COMPLETE CBC W/AUTO DIFF WBC: CPT

## 2023-01-15 PROCEDURE — 83605 ASSAY OF LACTIC ACID: CPT

## 2023-01-15 PROCEDURE — 85379 FIBRIN DEGRADATION QUANT: CPT

## 2023-01-15 PROCEDURE — 96365 THER/PROPH/DIAG IV INF INIT: CPT

## 2023-01-15 PROCEDURE — 96367 TX/PROPH/DG ADDL SEQ IV INF: CPT

## 2023-01-15 PROCEDURE — 87070 CULTURE OTHR SPECIMN AEROBIC: CPT

## 2023-01-15 PROCEDURE — 84484 ASSAY OF TROPONIN QUANT: CPT

## 2023-01-15 PROCEDURE — 81001 URINALYSIS AUTO W/SCOPE: CPT

## 2023-01-15 PROCEDURE — 99285 EMERGENCY DEPT VISIT HI MDM: CPT | Mod: 25

## 2023-01-15 PROCEDURE — 80048 BASIC METABOLIC PNL TOTAL CA: CPT

## 2023-01-15 PROCEDURE — 87449 NOS EACH ORGANISM AG IA: CPT

## 2023-01-15 PROCEDURE — 71275 CT ANGIOGRAPHY CHEST: CPT | Mod: MA

## 2023-01-15 PROCEDURE — 87899 AGENT NOS ASSAY W/OPTIC: CPT

## 2023-01-15 PROCEDURE — 85610 PROTHROMBIN TIME: CPT

## 2023-01-15 PROCEDURE — 87640 STAPH A DNA AMP PROBE: CPT

## 2023-01-15 PROCEDURE — 93005 ELECTROCARDIOGRAM TRACING: CPT

## 2023-01-15 PROCEDURE — 94640 AIRWAY INHALATION TREATMENT: CPT

## 2023-01-15 PROCEDURE — 87637 SARSCOV2&INF A&B&RSV AMP PRB: CPT

## 2023-01-15 PROCEDURE — 85730 THROMBOPLASTIN TIME PARTIAL: CPT

## 2023-01-15 PROCEDURE — 87040 BLOOD CULTURE FOR BACTERIA: CPT

## 2023-01-15 PROCEDURE — 87641 MR-STAPH DNA AMP PROBE: CPT

## 2023-01-15 PROCEDURE — 0225U NFCT DS DNA&RNA 21 SARSCOV2: CPT

## 2023-01-15 RX ORDER — CEFUROXIME AXETIL 250 MG
500 TABLET ORAL EVERY 12 HOURS
Refills: 0 | Status: DISCONTINUED | OUTPATIENT
Start: 2023-01-15 | End: 2023-01-15

## 2023-01-15 RX ORDER — CEFUROXIME AXETIL 250 MG
1 TABLET ORAL
Qty: 14 | Refills: 0
Start: 2023-01-15 | End: 2023-01-21

## 2023-01-15 RX ADMIN — CEFEPIME 100 MILLIGRAM(S): 1 INJECTION, POWDER, FOR SOLUTION INTRAMUSCULAR; INTRAVENOUS at 05:34

## 2023-01-15 RX ADMIN — Medication 100 MILLIGRAM(S): at 05:33

## 2023-01-15 RX ADMIN — PANTOPRAZOLE SODIUM 40 MILLIGRAM(S): 20 TABLET, DELAYED RELEASE ORAL at 05:33

## 2023-01-15 RX ADMIN — BUDESONIDE AND FORMOTEROL FUMARATE DIHYDRATE 2 PUFF(S): 160; 4.5 AEROSOL RESPIRATORY (INHALATION) at 08:53

## 2023-01-15 RX ADMIN — TIOTROPIUM BROMIDE 2 PUFF(S): 18 CAPSULE ORAL; RESPIRATORY (INHALATION) at 08:51

## 2023-01-15 NOTE — DISCHARGE NOTE PROVIDER - NSDCCPCAREPLAN_GEN_ALL_CORE_FT
PRINCIPAL DISCHARGE DIAGNOSIS  Diagnosis: Pneumonia  Assessment and Plan of Treatment: You were admitted with pneumonia You were treated with IV antibiotics. CT scan of the chest was negative for clot but confirmed pneumonia. You clinically improved  You should follow up with DR Bass for repeat imaging  You also need sleep study tests to be performed      SECONDARY DISCHARGE DIAGNOSES  Diagnosis: Fever in adult  Assessment and Plan of Treatment:

## 2023-01-15 NOTE — DISCHARGE NOTE PROVIDER - CARE PROVIDER_API CALL
Jeremy Bass (DO)  Critical Care Medicine; Internal Medicine; Pulmonary Disease  [address], [address]  [place] [address]  Phone: [phone]  Fax: [phone]  Follow Up Time: 1 week    Jasiel Barry (DO)  Internal Medicine  [address], [address]  [place], [place] [address]  Phone: [phone]  Fax: [phone]  Follow Up Time:

## 2023-01-15 NOTE — PROGRESS NOTE ADULT - ASSESSMENT
Pneumonia - patien tis currently stable. will continue antibiotics/inhalers. Pulmonary MD following. case discussed  COPD - will continue inhalers. Pulmonary following. Case discussed with Pulmonary MD  GERD - patient is GI stable  Obesity - patient advided weight reduction. recommend follow up with nutritionist as an outpatient  RLE DVT - patient is clinically stable. will continue  AC  HLD - patient is clinically stable. will continue atorvastatin  DJD - patient is clinically stable. will continue present management          Case discussed with PA/Pulmonary MD
Physical Examination:  GENERAL:               Alert, Oriented, No acute distress.    HEENT:                    No JVD, Moist MM  PULM:                     Bilateral air entry, Clear to auscultation bilaterally, no significant sputum production, trace Rales, No Rhonchi, No Wheezing  CVS:                         S1, S2,  No Murmur  ABD:                        Soft, nondistended, nontender, normoactive bowel sounds,   NEURO:                  Alert, oriented, interactive, nonfocal, follows commands  PSYC:                      Calm, + Insight.        Assessment  1. Recurrent Left sided pneumonia  2. Ex Smoker  3. H/o COVID  4. Hypoxemia with chronic home o2 3L PRN  5. [place] GERD, High chol, h/o DVT, COPD    Plan  Patient seen and examined comfortable  Can change antibiotics to ceftin 500mg bid x 10 day course    Check RVP   continue bronchodilators  Symbicort, Spiriva while in hospital plan to start Trelegy on d/c  n/c o2 to maintain sat  check sputum culture  - NGTD    as patient has recurrent left sided infiltrate and clinically patient appears to have pneumonia will need repeat ct chest in short term to r/o underlying malignancy    Patient states wants to f/u with me as out patient and has appointment on 1/23/23  no objection for out patient pulm follow up

## 2023-01-15 NOTE — DISCHARGE NOTE PROVIDER - HOSPITAL COURSE
62 year old male with a history of COPD on PRN 3L n/c O2 at night  (35 pack year smoking, quit August 2022), hx of DVT no longer on Xarelto (for past mo.), COVID pneumonia Oct 2022 with admission for bacterial pneumonia Nov 2022,  presents from home after coughing all night with productive phlegm this AM. Pt also checked oxygen levels with home pulse ox and he was 78% on RA. Wife checked temp this AM and it was elevated to 101.9F. She noted he was weak/shaky. He was admitted for recurrent pneumonia. CTA chest negative for PE but with JANNIE and LLL recurrent PNA.  Pulmonary consulted. As patient has recurrent left sided infiltrate and clinically patient appears to h have pneumonia will need repeat ct chest in short term to r/o underlying malignancy. COntinued on IV abx.  Oxygen closely monitored and improved during hospital course. Patient satting well on room air at rest and noted to be 87% on room ait with ambulation which improved with 2 L NC.  Patient medically stable for dc home with close outpatient follow up. He was discharged home on ceftin 500 bid x 10 days. Last day of abx 1/25. Has appt w Dr Bass on 1.23    Discharging provider: Adelita RENEE   Outpatient PMD Dr Barry: aware      **RESULTS**  < from: CT Angio Chest PE Protocol w/ IV Cont (01.13.23 @ 14:40) >      IMPRESSION:  Left upper and lower lobe pneumonia.  No evidence of pulmonary emboli.        --- End of Report ---    < end of copied text >

## 2023-01-15 NOTE — DISCHARGE NOTE PROVIDER - NSDCFUSCHEDAPPT_GEN_ALL_CORE_FT
Valerie Reid  [facility]  Melyssa Davis  Scheduled Appointment: 03/13/2023

## 2023-01-15 NOTE — DISCHARGE NOTE PROVIDER - NSDCMRMEDTOKEN_GEN_ALL_CORE_FT
albuterol 90 mcg/inh inhalation aerosol: 2 puff(s) inhaled every 6 hours, As needed, Shortness of Breath and/or Wheezing  atorvastatin 20 mg oral tablet: 1 tab(s) orally once a day (at bedtime)  meloxicam 15 mg oral tablet: 1 tab(s) orally once a day  PriLOSEC 40 mg oral delayed release capsule: 1 cap(s) orally once a day  Trelegy Ellipta 200 mcg-62.5 mcg-25 mcg/inh inhalation powder: 1 puff(s) inhaled once a day   albuterol 90 mcg/inh inhalation aerosol: 2 puff(s) inhaled every 6 hours, As needed, Shortness of Breath and/or Wheezing  atorvastatin 20 mg oral tablet: 1 tab(s) orally once a day (at bedtime)  cefuroxime 500 mg oral tablet: 1 tab(s) orally every 12 hours  meloxicam 15 mg oral tablet: 1 tab(s) orally once a day  PriLOSEC 40 mg oral delayed release capsule: 1 cap(s) orally once a day  Trelegy Ellipta 200 mcg-62.5 mcg-25 mcg/inh inhalation powder: 1 puff(s) inhaled once a day

## 2023-01-15 NOTE — CHART NOTE - NSCHARTNOTEFT_GEN_A_CORE
63 YO M with COPD on PRN O2 at night due to prior severe tobacco abuse, 3 recent admissions  for pneumonia, presents from home w/ one day of coughing, fever, chills, hypoxia down to 78%.    # Acute hypoxic respiratory failure and Severe sepsis 2/2 Bacterial pneumonia, likely gram negative  -WBC 23k, fever up to 101.9F, tachypnea 24, hypoxia. Received ceftriaxone/azithro in ED. Fluid restrictive strategy used as ED noted possible trace LE edema and lactate wnl.  -CTA chest showing no evidence of PE, but shows JANNIE and LLL pneumonia. CTA chest performed 11/24/22 showing JANNIE and LLL ground glass opacities (same location). Multiple admissions for pneumonia since Aug 2022.  - Cont IV Cefepime -day 2  - PCT 1.10  - f/u urine strep pneumo ag, legionella  - f/u BCx x2, Sputum Cx  - f/u MRSA/MSSA nares  - antitussives  - Pulmonary consulted, discussed case w/ Dr. Bass  - continuous pulse ox, telemetry  - Monitor O2 sat--currently 94% on 1 L NC     # COPD  - no wheezing on exam  - on trelegy and PRN albuterol at home, started symbicort/tiotropium here  - duonebs (unable to order standing), PRN albuterol  - incentive spirometry  - f/u pulm recs    # Nocturnal hypoxia  - recommended that pt get OP sleep study    # GERD  - on omeprazole at home, cont pantoprazole    # Osteoarthritis  - pt is on meloxicam at home, cont celecoxib here.    # DVT, no longer on xarelto  - no swelling in the legs today    # HLD  - cont home atorvastatin    # High risk obesity  - recommend nutrition consult and weight loss.     code status: full code  Dispo: home when medically stable     *Case dw DR Bass
63 YO M with COPD on PRN O2 at night due to prior severe tobacco abuse, 3 recent admissions  for pneumonia, presents from home w/ one day of coughing, fever, chills, hypoxia down to 78%.    # Acute hypoxic respiratory failure and Severe sepsis 2/2 Bacterial pneumonia, likely gram negative  -WBC 23k, fever up to 101.9F, tachypnea 24, hypoxia. Received ceftriaxone/azithro in ED. Fluid restrictive strategy used as ED noted possible trace LE edema and lactate wnl.  -CTA chest showing no evidence of PE, but shows JANNIE and LLL pneumonia. CTA chest performed 11/24/22 showing JANNIE and LLL ground glass opacities (same location). Multiple admissions for pneumonia since Aug 2022.  - Completed 2 days IV Cefepime--transition to Ceftin 500 BID x 10 days   - PCT 1.10  - f/u urine strep pneumo ag, legionella  - f/u BCx x2, Sputum Cx  - f/u MRSA/MSSA nares  - antitussives  - Pulmonary consulted, discussed case w/ Dr. Bass--has outpatient appt   - continuous pulse ox, telemetry  - Monitor O2 sat--currently 94% on room air--desatted to 87% on room air with ambulation--HAS HOME O2      # Nocturnal hypoxia  - recommended that pt get OP sleep study      # HLD  - cont home atorvastatin    # High risk obesity  - recommend nutrition consult and weight loss.     code status: full code  Dispo: home when medically stable . Suspect within 24 hrs     *Case dw DR Bass.

## 2023-01-15 NOTE — DISCHARGE NOTE NURSING/CASE MANAGEMENT/SOCIAL WORK - NSDCPEFALRISK_GEN_ALL_CORE
For information on Fall & Injury Prevention, visit: [email or web address] OR  [email or web address] OR  https://www.cdc.gov/steadi/patient.html

## 2023-01-15 NOTE — DISCHARGE NOTE NURSING/CASE MANAGEMENT/SOCIAL WORK - PATIENT PORTAL LINK FT
You can access the FollowMyHealth Patient Portal offered by [facility] by registering at the following website: [email or web address]. By joining [facility]’s FollowMyHealth portal, you will also be able to view your health information using other applications (apps) compatible with our system.

## 2023-01-16 LAB
CULTURE RESULTS: SIGNIFICANT CHANGE UP
SPECIMEN SOURCE: SIGNIFICANT CHANGE UP

## 2023-01-27 ENCOUNTER — RX RENEWAL (OUTPATIENT)
Age: 63
End: 2023-01-27

## 2023-01-31 PROBLEM — J44.9 CHRONIC OBSTRUCTIVE PULMONARY DISEASE, UNSPECIFIED: Chronic | Status: ACTIVE | Noted: 2023-01-13

## 2023-02-13 ENCOUNTER — OUTPATIENT (OUTPATIENT)
Dept: OUTPATIENT SERVICES | Facility: HOSPITAL | Age: 63
LOS: 1 days | End: 2023-02-13
Payer: COMMERCIAL

## 2023-02-13 ENCOUNTER — APPOINTMENT (OUTPATIENT)
Dept: MRI IMAGING | Facility: CLINIC | Age: 63
End: 2023-02-13
Payer: COMMERCIAL

## 2023-02-13 DIAGNOSIS — Z00.8 ENCOUNTER FOR OTHER GENERAL EXAMINATION: ICD-10-CM

## 2023-02-13 DIAGNOSIS — Z98.89 OTHER SPECIFIED POSTPROCEDURAL STATES: Chronic | ICD-10-CM

## 2023-02-13 DIAGNOSIS — Z96.652 PRESENCE OF LEFT ARTIFICIAL KNEE JOINT: Chronic | ICD-10-CM

## 2023-02-13 PROCEDURE — 72197 MRI PELVIS W/O & W/DYE: CPT | Mod: 26

## 2023-02-13 PROCEDURE — A9585: CPT

## 2023-02-13 PROCEDURE — 76498P: CUSTOM | Mod: 26

## 2023-02-13 PROCEDURE — 72197 MRI PELVIS W/O & W/DYE: CPT

## 2023-02-13 PROCEDURE — 76498 UNLISTED MR PROCEDURE: CPT

## 2023-02-27 ENCOUNTER — RX RENEWAL (OUTPATIENT)
Age: 63
End: 2023-02-27

## 2023-03-02 ENCOUNTER — APPOINTMENT (OUTPATIENT)
Dept: ULTRASOUND IMAGING | Facility: CLINIC | Age: 63
End: 2023-03-02
Payer: COMMERCIAL

## 2023-03-02 ENCOUNTER — OUTPATIENT (OUTPATIENT)
Dept: OUTPATIENT SERVICES | Facility: HOSPITAL | Age: 63
LOS: 1 days | End: 2023-03-02
Payer: COMMERCIAL

## 2023-03-02 DIAGNOSIS — Z98.89 OTHER SPECIFIED POSTPROCEDURAL STATES: Chronic | ICD-10-CM

## 2023-03-02 DIAGNOSIS — Z96.652 PRESENCE OF LEFT ARTIFICIAL KNEE JOINT: Chronic | ICD-10-CM

## 2023-03-02 DIAGNOSIS — R76.0 RAISED ANTIBODY TITER: ICD-10-CM

## 2023-03-02 PROCEDURE — 93970 EXTREMITY STUDY: CPT

## 2023-03-02 PROCEDURE — 93970 EXTREMITY STUDY: CPT | Mod: 26

## 2023-03-03 PROBLEM — R76.0 LUPUS ANTICOAGULANT POSITIVE: Status: ACTIVE | Noted: 2017-09-26

## 2023-03-10 ENCOUNTER — OUTPATIENT (OUTPATIENT)
Dept: OUTPATIENT SERVICES | Facility: HOSPITAL | Age: 63
LOS: 1 days | Discharge: HOME | End: 2023-03-10

## 2023-03-10 DIAGNOSIS — Z96.652 PRESENCE OF LEFT ARTIFICIAL KNEE JOINT: Chronic | ICD-10-CM

## 2023-03-10 DIAGNOSIS — Z98.89 OTHER SPECIFIED POSTPROCEDURAL STATES: Chronic | ICD-10-CM

## 2023-03-10 DIAGNOSIS — D64.9 ANEMIA, UNSPECIFIED: ICD-10-CM

## 2023-03-13 ENCOUNTER — APPOINTMENT (OUTPATIENT)
Dept: HEMATOLOGY ONCOLOGY | Facility: CLINIC | Age: 63
End: 2023-03-13
Payer: COMMERCIAL

## 2023-03-13 ENCOUNTER — APPOINTMENT (OUTPATIENT)
Dept: ORTHOPEDIC SURGERY | Facility: CLINIC | Age: 63
End: 2023-03-13
Payer: COMMERCIAL

## 2023-03-13 ENCOUNTER — NON-APPOINTMENT (OUTPATIENT)
Age: 63
End: 2023-03-13

## 2023-03-13 VITALS — BODY MASS INDEX: 36.83 KG/M2 | WEIGHT: 287 LBS | HEIGHT: 74 IN

## 2023-03-13 DIAGNOSIS — R76.0 RAISED ANTIBODY TITER: ICD-10-CM

## 2023-03-13 PROCEDURE — 99213 OFFICE O/P EST LOW 20 MIN: CPT

## 2023-03-13 PROCEDURE — 99213 OFFICE O/P EST LOW 20 MIN: CPT | Mod: 95

## 2023-03-13 NOTE — DISCUSSION/SUMMARY
[de-identified] : 63m with right knee djd. completed visco injections\par 1) wbat\par 2) cryotherapy\par 3) hep\par 4) rtc as needed for repeat injection series.

## 2023-03-13 NOTE — HISTORY OF PRESENT ILLNESS
[de-identified] : Patient underwent left knee arthroscopic surgery 8/2014. Approximately 2 weeks postoperatively, he noticed right lower extremity swelling and pain. He sought attention at an urgent care center, and had a right lower extremity ultrasound which revealed a DVT within the right popliteal, proximal and mid posterior tibial veins.He was begun on Xarelto.He had no prior history of venous thromboembolic disease nor any family history of venous thromboembolism.\par Prothrombin gene mutation study normal. APC resistance within normal limits. +LA at that time.  ROBBIE WNL.  Maintained on Xarelto .\par \par 2/2018-10/2022-No hematology f/u.\par \par 10/2022-Returned for hematology f/u. 12/2022 LA screen negative (tested when off anticoagulant). [de-identified] : Has prostate biopsy scheduled 3/27/23.\par No current complaints of chest pain, shortness of breath, calf pain. No abnormal bleeding reported. No fevers.\par \par

## 2023-03-13 NOTE — HISTORY OF PRESENT ILLNESS
[7] : 7 [5] : 5 [Sharp] : sharp [Constant] : constant [4] : 4 [Orthovisc] : Orthovisc [de-identified] : 1/12/23: Here for fu orthovisc #3\par 01/05/2023: Here to f/up right knee and Orthovisc # 3\par 12/29/2022: Here to f/up right knee and Orthovisc # 2\par 12/22/2022: Here to f/up right knee and Orthovisc # 1\par 12/09/2022 Mr. JAYLNY MAYER, a 62 year old male, presents today for right knee. Seen in [facility] on 11.05.22, received a csi with temporary relief. Reports continued and worsening right knee pain and stiffness. Pain with walking and increased activity. \par hx of left TKA\par Retired [] : no [FreeTextEntry1] : right knee  [de-identified] : left tka [de-identified] : 01/05/23 [TWNoteComboBox1] : 20%

## 2023-03-13 NOTE — ASSESSMENT
[FreeTextEntry1] : Lab work, LE venous duplex study report reviewed.\par Patient underwent left knee arthroscopic surgery 8/2014. Approximately 2 weeks postoperatively, he noticed right lower extremity swelling and pain. He sought attention at an urgent care center, and had a right lower extremity ultrasound which revealed a DVT within the right popliteal, proximal and mid posterior tibial veins. He was begun on Xarelto.He had no prior history of venous thromboembolic disease nor any family history of venous thromboembolism.\par Prothrombin gene mutation study normal. APC resistance within normal limits. +LA at that time.  ROBBIE WNL.  Maintained on xarelto from 8/2014-12/2022.\par Lupus anticoagulant tested when patient off anticoagulation, and was negative in 12/2022. \par 3/2033 LE venous duplex study negative for DVT. \par Have discussed with patient that bleeding risks with anticoagulation are to be considered versus clotting risks without anticoagulation in ongoing decision making.  He remains off anticoagulant at this time.  Have explained that if recurrent event, then indefinite anticoagulation would be advised.\par DVT prophylaxis in high risk situations.\par \par Patient was given the opportunity to ask questions. His questions have been answered to his apparent satisfaction.

## 2023-03-13 NOTE — PHYSICAL EXAM
[Right] : right knee [] : no extensor lag [Left] : left knee [NL (0)] : extension 0 degrees [de-identified] : extension 3 degrees [TWNoteComboBox7] : flexion 120 degrees

## 2023-03-13 NOTE — PHYSICAL EXAM
[de-identified] : non-toxic appearing [de-identified] : breathing appeared unlabored [de-identified] : coloration appeared normal

## 2023-03-14 ENCOUNTER — APPOINTMENT (OUTPATIENT)
Dept: ORTHOPEDIC SURGERY | Facility: CLINIC | Age: 63
End: 2023-03-14
Payer: COMMERCIAL

## 2023-03-14 VITALS — WEIGHT: 287 LBS | BODY MASS INDEX: 36.83 KG/M2 | HEIGHT: 74 IN

## 2023-03-14 DIAGNOSIS — M47.812 SPONDYLOSIS W/OUT MYELOPATHY OR RADICULOPATHY, CERVICAL REGION: ICD-10-CM

## 2023-03-14 DIAGNOSIS — M48.02 SPINAL STENOSIS, CERVICAL REGION: ICD-10-CM

## 2023-03-14 DIAGNOSIS — M48.061 SPINAL STENOSIS, LUMBAR REGION WITHOUT NEUROGENIC CLAUDICATION: ICD-10-CM

## 2023-03-14 DIAGNOSIS — M47.816 SPONDYLOSIS W/OUT MYELOPATHY OR RADICULOPATHY, LUMBAR REGION: ICD-10-CM

## 2023-03-14 PROCEDURE — 99214 OFFICE O/P EST MOD 30 MIN: CPT

## 2023-03-30 PROBLEM — M48.061 LUMBAR STENOSIS: Status: ACTIVE | Noted: 2023-03-30

## 2023-03-30 PROBLEM — M47.816 LUMBAR SPONDYLOSIS: Status: ACTIVE | Noted: 2023-03-30

## 2023-03-30 PROBLEM — M48.02 LATERAL RECESS STENOSIS OF CERVICAL SPINE: Status: ACTIVE | Noted: 2023-03-30

## 2023-03-30 PROBLEM — M47.812 CERVICAL SPONDYLOSIS: Status: ACTIVE | Noted: 2023-03-30

## 2023-03-30 NOTE — IMAGING
[de-identified] : gait nonmyelopathic, nonantalgic, forward stooped\par toe heel walking intact\par - spurling\par - SLR\par - Tsang\par BLE reflexes 2/5\par \par \par \par reflexes symmetric\par motor no focal deficit, 4-5/5\par sensation grossly intact LT\par \par focal tenderness absent\par some L and C spine spasm

## 2023-03-30 NOTE — HISTORY OF PRESENT ILLNESS
[Neck] : neck [Mid-back] : mid-back [Lower back] : lower back [Gradual] : gradual [6] : 6 [Localized] : localized [Constant] : constant [Rest] : rest [Heat] : heat [Nothing helps with pain getting better] : Nothing helps with pain getting better [de-identified] : diffuse spine pain ;  cervicothoracic lumbar;  present mostly with exertion ; but there is an element of rest pain;  there are times when the pain shoots down the arms and into the legs;  both arms and leg feel at times weak;  has managed with OTC meds ;  stretching program , relative rest and avoidance when needed [] : no [FreeTextEntry5] : JAYLYN MAYER  is a 63 year old male presenting with c. t. l spine pain. Neck is stiff. Low back is okay. Has had lumbar laminectomy in 1990. 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

## 2023-03-30 NOTE — ASSESSMENT
[FreeTextEntry1] : residual symptoms ;  despite laminectomy decompression;\par diffuse spondylosis and loss of motion ; with associated spasm due to the pain \par HEP versus PT;  pain interventions (injections) PRN

## 2023-03-30 NOTE — DATA REVIEWED
[MRI] : MRI [Cervical Spine] : cervical spine [Thoracic Spine] : thoracic spine [Lumbar Spine] : lumbar spine [Report was reviewed and noted in the chart] : The report was reviewed and noted in the chart [FreeTextEntry1] : plain xrays:  diffuse spondylosis with loss of disc height,  no deformity nor instability,  no masses nor lesions

## 2023-04-06 ENCOUNTER — APPOINTMENT (OUTPATIENT)
Dept: ORTHOPEDIC SURGERY | Facility: CLINIC | Age: 63
End: 2023-04-06
Payer: COMMERCIAL

## 2023-04-06 ENCOUNTER — NON-APPOINTMENT (OUTPATIENT)
Age: 63
End: 2023-04-06

## 2023-04-06 ENCOUNTER — EMERGENCY (EMERGENCY)
Facility: HOSPITAL | Age: 63
LOS: 1 days | Discharge: HOME | End: 2023-04-06
Attending: STUDENT IN AN ORGANIZED HEALTH CARE EDUCATION/TRAINING PROGRAM | Admitting: STUDENT IN AN ORGANIZED HEALTH CARE EDUCATION/TRAINING PROGRAM
Payer: COMMERCIAL

## 2023-04-06 VITALS
HEIGHT: 72 IN | TEMPERATURE: 99 F | DIASTOLIC BLOOD PRESSURE: 86 MMHG | RESPIRATION RATE: 19 BRPM | OXYGEN SATURATION: 93 % | HEART RATE: 79 BPM | SYSTOLIC BLOOD PRESSURE: 164 MMHG | WEIGHT: 286.6 LBS

## 2023-04-06 VITALS — HEIGHT: 74 IN | WEIGHT: 287 LBS | BODY MASS INDEX: 36.83 KG/M2

## 2023-04-06 DIAGNOSIS — M79.89 OTHER SPECIFIED SOFT TISSUE DISORDERS: ICD-10-CM

## 2023-04-06 DIAGNOSIS — Z98.89 OTHER SPECIFIED POSTPROCEDURAL STATES: Chronic | ICD-10-CM

## 2023-04-06 DIAGNOSIS — Z96.652 PRESENCE OF LEFT ARTIFICIAL KNEE JOINT: Chronic | ICD-10-CM

## 2023-04-06 LAB
ALBUMIN SERPL ELPH-MCNC: 3.5 G/DL — SIGNIFICANT CHANGE UP (ref 3.3–5)
ALP SERPL-CCNC: 83 U/L — SIGNIFICANT CHANGE UP (ref 40–120)
ALT FLD-CCNC: 46 U/L — HIGH (ref 10–45)
ANION GAP SERPL CALC-SCNC: 10 MMOL/L — SIGNIFICANT CHANGE UP (ref 5–17)
APTT BLD: 28.1 SEC — SIGNIFICANT CHANGE UP (ref 27.5–35.5)
AST SERPL-CCNC: 23 U/L — SIGNIFICANT CHANGE UP (ref 10–40)
BASOPHILS # BLD AUTO: 0.05 K/UL — SIGNIFICANT CHANGE UP (ref 0–0.2)
BASOPHILS NFR BLD AUTO: 0.4 % — SIGNIFICANT CHANGE UP (ref 0–2)
BILIRUB SERPL-MCNC: 0.2 MG/DL — SIGNIFICANT CHANGE UP (ref 0.2–1.2)
BLD GP AB SCN SERPL QL: SIGNIFICANT CHANGE UP
BUN SERPL-MCNC: 15 MG/DL — SIGNIFICANT CHANGE UP (ref 7–23)
CALCIUM SERPL-MCNC: 8.7 MG/DL — SIGNIFICANT CHANGE UP (ref 8.4–10.5)
CHLORIDE SERPL-SCNC: 105 MMOL/L — SIGNIFICANT CHANGE UP (ref 96–108)
CO2 SERPL-SCNC: 28 MMOL/L — SIGNIFICANT CHANGE UP (ref 22–31)
CREAT SERPL-MCNC: 1.03 MG/DL — SIGNIFICANT CHANGE UP (ref 0.5–1.3)
EGFR: 82 ML/MIN/1.73M2 — SIGNIFICANT CHANGE UP
EOSINOPHIL # BLD AUTO: 0.45 K/UL — SIGNIFICANT CHANGE UP (ref 0–0.5)
EOSINOPHIL NFR BLD AUTO: 4 % — SIGNIFICANT CHANGE UP (ref 0–6)
FLUAV AG NPH QL: SIGNIFICANT CHANGE UP
FLUBV AG NPH QL: SIGNIFICANT CHANGE UP
GLUCOSE SERPL-MCNC: 136 MG/DL — HIGH (ref 70–99)
HCT VFR BLD CALC: 42.1 % — SIGNIFICANT CHANGE UP (ref 39–50)
HGB BLD-MCNC: 13.9 G/DL — SIGNIFICANT CHANGE UP (ref 13–17)
IMM GRANULOCYTES NFR BLD AUTO: 0.4 % — SIGNIFICANT CHANGE UP (ref 0–0.9)
INR BLD: 1.09 RATIO — SIGNIFICANT CHANGE UP (ref 0.88–1.16)
LYMPHOCYTES # BLD AUTO: 2.75 K/UL — SIGNIFICANT CHANGE UP (ref 1–3.3)
LYMPHOCYTES # BLD AUTO: 24.3 % — SIGNIFICANT CHANGE UP (ref 13–44)
MCHC RBC-ENTMCNC: 30.5 PG — SIGNIFICANT CHANGE UP (ref 27–34)
MCHC RBC-ENTMCNC: 33 GM/DL — SIGNIFICANT CHANGE UP (ref 32–36)
MCV RBC AUTO: 92.5 FL — SIGNIFICANT CHANGE UP (ref 80–100)
MONOCYTES # BLD AUTO: 0.86 K/UL — SIGNIFICANT CHANGE UP (ref 0–0.9)
MONOCYTES NFR BLD AUTO: 7.6 % — SIGNIFICANT CHANGE UP (ref 2–14)
NEUTROPHILS # BLD AUTO: 7.18 K/UL — SIGNIFICANT CHANGE UP (ref 1.8–7.4)
NEUTROPHILS NFR BLD AUTO: 63.3 % — SIGNIFICANT CHANGE UP (ref 43–77)
NRBC # BLD: 0 /100 WBCS — SIGNIFICANT CHANGE UP (ref 0–0)
PLATELET # BLD AUTO: 195 K/UL — SIGNIFICANT CHANGE UP (ref 150–400)
POTASSIUM SERPL-MCNC: 3.7 MMOL/L — SIGNIFICANT CHANGE UP (ref 3.5–5.3)
POTASSIUM SERPL-SCNC: 3.7 MMOL/L — SIGNIFICANT CHANGE UP (ref 3.5–5.3)
PROT SERPL-MCNC: 6.9 G/DL — SIGNIFICANT CHANGE UP (ref 6–8.3)
PROTHROM AB SERPL-ACNC: 12.6 SEC — SIGNIFICANT CHANGE UP (ref 10.5–13.4)
RBC # BLD: 4.55 M/UL — SIGNIFICANT CHANGE UP (ref 4.2–5.8)
RBC # FLD: 13.6 % — SIGNIFICANT CHANGE UP (ref 10.3–14.5)
RSV RNA NPH QL NAA+NON-PROBE: SIGNIFICANT CHANGE UP
SARS-COV-2 RNA SPEC QL NAA+PROBE: SIGNIFICANT CHANGE UP
SODIUM SERPL-SCNC: 143 MMOL/L — SIGNIFICANT CHANGE UP (ref 135–145)
WBC # BLD: 11.33 K/UL — HIGH (ref 3.8–10.5)
WBC # FLD AUTO: 11.33 K/UL — HIGH (ref 3.8–10.5)

## 2023-04-06 PROCEDURE — 93971 EXTREMITY STUDY: CPT | Mod: 26,RT

## 2023-04-06 PROCEDURE — 99213 OFFICE O/P EST LOW 20 MIN: CPT

## 2023-04-06 PROCEDURE — 99285 EMERGENCY DEPT VISIT HI MDM: CPT

## 2023-04-06 PROCEDURE — 93010 ELECTROCARDIOGRAM REPORT: CPT

## 2023-04-06 NOTE — ED ADULT TRIAGE NOTE - CHIEF COMPLAINT QUOTE
Pt c/o right lower leg swelling started Saturday, doppler + DVT same leg,  blood thinners stopped 4 months ago, h/o COPD

## 2023-04-06 NOTE — ED ADULT NURSE NOTE - OBJECTIVE STATEMENT
Patient presents to ED with complaint of right knee pain x 4 days, patient states he had a doppler perfomred today which showed a DVT. Patient states he has a hisotry of DVT's and eliquis was Discontinued several months prior. Alert and oriented x 4, no signs or symptoms of nausea, vomiting, dizziness, chest pain or SOB.

## 2023-04-06 NOTE — ED ADULT NURSE NOTE - NSICDXPASTMEDICALHX_GEN_ALL_CORE_FT
PAST MEDICAL HISTORY:  Basal cell carcinoma removed    Bronchitis winter 2014    COPD (chronic obstructive pulmonary disease)     DVT (deep venous thrombosis) "right lower extremity,after meniscus repair  08/2014 & Had Hemato consult (Prothrombin gene mutation study was normal+LA),treated with Xarelto" & currently on xarelto.    GERD (gastroesophageal reflux disease)     Hyperlipidemia     Pneumonia

## 2023-04-06 NOTE — PHYSICAL EXAM
[Right] : right knee [Left] : left knee [NL (0)] : extension 0 degrees [] : no extensor lag [FreeTextEntry3] : + LE swelling [FreeTextEntry8] : Calf firm, non tender [de-identified] : extension 3 degrees [TWNoteComboBox7] : flexion 120 degrees

## 2023-04-06 NOTE — HISTORY OF PRESENT ILLNESS
[7] : 7 [5] : 5 [Sharp] : sharp [Constant] : constant [4] : 4 [Orthovisc] : Orthovisc [de-identified] : 4/6/23: Follow up for his right knee. States pain started again 3 days ago and was severe. Also c/o swelling in the leg. H/O DVT in 2015, just came off Xarelto 3 months ago. \par 1/12/23: Here for fu orthovisc #4\par 01/05/2023: Here to f/up right knee and Orthovisc # 3\par 12/29/2022: Here to f/up right knee and Orthovisc # 2\par 12/22/2022: Here to f/up right knee and Orthovisc # 1\par 12/09/2022 Mr. JAYLYN MAYER, a 62 year old male, presents today for right knee. Seen in [facility] on 11.05.22, received a csi with temporary relief. Reports continued and worsening right knee pain and stiffness. Pain with walking and increased activity. \par hx of left TKA\par Retired [] : no [FreeTextEntry1] : right knee  [FreeTextEntry5] : Pt feeling a slight improvement on the Rt knee since monday but still feels uncomfortable.   [de-identified] : left tka [de-identified] : 01/05/23 [TWNoteComboBox1] : 20%

## 2023-04-06 NOTE — DISCUSSION/SUMMARY
[de-identified] : 63m with right LE swelling and pain. h/o DVT. right knee djd and effusion. completed visco injections\par 1) STAT doppler to r/o DVT\par 2) cryotherapy\par 3) will follow doppler results\par 4) fu in 1 week for asp/inj if pain persists

## 2023-04-07 VITALS
DIASTOLIC BLOOD PRESSURE: 89 MMHG | HEART RATE: 83 BPM | TEMPERATURE: 99 F | RESPIRATION RATE: 17 BRPM | SYSTOLIC BLOOD PRESSURE: 151 MMHG | OXYGEN SATURATION: 95 %

## 2023-04-07 PROCEDURE — 80053 COMPREHEN METABOLIC PANEL: CPT

## 2023-04-07 PROCEDURE — 99285 EMERGENCY DEPT VISIT HI MDM: CPT | Mod: 25

## 2023-04-07 PROCEDURE — 86900 BLOOD TYPING SEROLOGIC ABO: CPT

## 2023-04-07 PROCEDURE — 85610 PROTHROMBIN TIME: CPT

## 2023-04-07 PROCEDURE — 93971 EXTREMITY STUDY: CPT

## 2023-04-07 PROCEDURE — 86901 BLOOD TYPING SEROLOGIC RH(D): CPT

## 2023-04-07 PROCEDURE — 87637 SARSCOV2&INF A&B&RSV AMP PRB: CPT

## 2023-04-07 PROCEDURE — 85025 COMPLETE CBC W/AUTO DIFF WBC: CPT

## 2023-04-07 PROCEDURE — 85730 THROMBOPLASTIN TIME PARTIAL: CPT

## 2023-04-07 PROCEDURE — 93005 ELECTROCARDIOGRAM TRACING: CPT

## 2023-04-07 PROCEDURE — 36415 COLL VENOUS BLD VENIPUNCTURE: CPT

## 2023-04-07 PROCEDURE — 86850 RBC ANTIBODY SCREEN: CPT

## 2023-04-07 RX ORDER — RIVAROXABAN 15 MG-20MG
1 KIT ORAL
Qty: 42 | Refills: 0
Start: 2023-04-07 | End: 2023-04-27

## 2023-04-07 RX ORDER — RIVAROXABAN 15 MG-20MG
15 KIT ORAL ONCE
Refills: 0 | Status: COMPLETED | OUTPATIENT
Start: 2023-04-07 | End: 2023-04-07

## 2023-04-07 RX ADMIN — RIVAROXABAN 15 MILLIGRAM(S): KIT at 01:40

## 2023-04-07 NOTE — ED PROVIDER NOTE - PHYSICAL EXAMINATION
VITAL SIGNS: I have reviewed nursing notes and confirm.   GEN: Well-developed; well-nourished; in no acute distress. Speaking full sentences.  SKIN: Warm, pink, no rash, no diaphoresis, no cyanosis, well perfused.   HEAD: Normocephalic; atraumatic. No scalp lacerations, no abrasions.  NECK: Supple; non tender.   EYES: Pupils 3mm equal, round, reactive to light and accomodation, conjunctiva and sclera clear. Extra-ocular movements intact bilaterally.  ENT: No nasal discharge; airway clear. Trachea is midline. ORAL: No oropharyngeal exudates or erythema. Normal dentition.  CV: Regular rate and rhythm. S1, S2 normal; no murmurs, gallops, or rubs. No lower extremity pitting edema bilaterally. Capillary refill < 2 seconds throughout. Distal pulses intact 2+ throughout.  RESP: CTA bilaterally. No wheezes, rales, or rhonchi.   ABD: Normal bowel sounds, soft, non-distended, non-tender, no rebound, no guarding, no rigidity, no hepatosplenomegaly. No CVA tenderness bilaterally.  MSK: Normal range of motion and movement of all 4 extremities. No joint or muscular pain throughout. No clubbing.   RIGHT LEG: (+) mild swelling, mild posterior knee ttp, distal pulses 2+,   BACK: No thoracolumbar midline or paravertebral tenderness. No step-offs or obvious deformities.  NEURO: Alert & oriented x 3, Grossly unremarkable. Sensory and motor intact throughout. No focal deficits. Gait: Fluid. Normal speech and coordination.   PSYCH: Cooperative, appropriate.

## 2023-04-07 NOTE — ED PROVIDER NOTE - NSFOLLOWUPINSTRUCTIONS_ED_ALL_ED_FT
Deep Vein Thrombosis    A deep vein thrombosis (DVT) is a blood clot (thrombus) that usually occurs in a deep, larger vein of the lower leg or the pelvis, or in an upper extremity such as the arm. These are dangerous and can lead to serious and even life-threatening complications if the clot travels to the lungs. Symptoms include swelling of the arm or leg, warmth and redness of the arm or leg, and pain. Treatment may include taking a blood thinning medication; make sure to take anything prescribed as instructed.    SEEK IMMEDIATE MEDICAL CARE IF YOU HAVE ANY OF THE FOLLOWING SYMPTOMS: shortness of breath, chest pain, rapid or irregular heartbeat, lightheadedness/dizziness, coughing up blood, or any bleeding in your vomit, stool, or urine. These symptoms may represent a serious problem that is an emergency. Do not wait to see if the symptoms will go away. Call 911 and do not drive yourself to the hospital.    Take acetaminophen 650 mg orally every 6-8 hours for pain control as needed. Please do not exceed 4,000 mg of acetaminophen during a 24 hours period. Acetaminophen can be found in many over-the-counter cold medications as well as opioid medications that may be given for pain.    Take ibuprofen (also known as MOTRIN or ADVIL) 400 mg orally every 6-8 hours for pain control as needed with food to avoid an upset stomach. Ibuprofen can be found in many over-the-counter medications. Please do not take ibuprofen if you have a bleeding disorder, stomach or gastrointestinal ulcer, or liver disease.    If needed, you can alternate these medications so that you can take one medication every 3 hours. For example, at noon take ibuprofen, then at 3PM take acetaminophen, then at 6PM take ibuprofen.    Follow up with your vascular surgeon in 2-5 days for further evaluation.

## 2023-04-07 NOTE — ED PROVIDER NOTE - OBJECTIVE STATEMENT
63M pmhx COPD on PRN 3L n/c O2 at night  (35 pack year smoking, quit August 2022), hx of DVT no longer on Xarelto (for past mo.), COVID pneumonia Oct 2022 presenting with right leg swelling and pain x 4 days, comes with outpatient (OSH) ultrasound doppler findings of DVT (no official report) today. Describes mild pain, behind the knee, a/w leg swelling mild. Denies any chest pain, abdominal pain, shortness of breath, nausea/vomiting, headaches, fevers, chills, diarrhea ,constipation, weakness, syncope, hematuria, dysuria, urinary symptoms, subjective neurological deficits. Denies recent travel, recent surgery, immobility,  hemoptysis, hormone use, known personal cancer history.

## 2023-04-07 NOTE — ED PROVIDER NOTE - CLINICAL SUMMARY MEDICAL DECISION MAKING FREE TEXT BOX
Pt presents with unilateral leg swelling and pain for 4 days. Exam is nontoxic appearing, VSS. No lymphangitic spread visible. No fluid pockets or fluctuance concerning for abscess noted. Low concern for cellulitis or osteomyelitis. No evidence of phlegmasia cerulea or alba dolens. Focal and unilateral nature not consistent with heart failure.  Workup: US Venous Doppler Lower Extremity, labs, D-dimer.    Rx: (choose below)  •Lovenox should always be weight based, No Cap on dosage. Obese patients should not receive the alternate daily dosing of 1.5mg/kg q24    Disposition: Discharge with appropriate follow up with cardiology. Instructed patient to follow up with primary care physician in next 48 hours as well. Return precautions given.    PEARLS:  •Isolated thromboses of the calf veins (tibial or peroneal) •Don’t treat but re-check faster: repeat ultrasound in 2-5 days •Up to 25% of isolated calf vein thromboses can propagate proximally in hospitalized or postoperative patients. These patients, however, are at a higher risk than ambulatory patients. This still led to the recommendation for repeat ultrasound.  •Negative US study with actually concerning symptoms/history needs follow up study in one week per current teaching (2018)  •There is evidence that if you are comfortable discharging a positive D-dimer and negative DVT study you may save patients the need for follow up if you order both and they are negative •Both negative, equals really clear and no one week follow up  •D-dimer positive + negative US = convincing evidence for patient to follow up. 63M pmhx COPD on PRN 3L n/c O2 at night  (35 pack year smoking, quit August 2022), hx of DVT no longer on Xarelto (for past mo.), COVID pneumonia Oct 2022 presenting with right leg swelling and pain x 4 days, comes with outpatient (OSH) ultrasound doppler findings of DVT (no official report) today. Exam is nontoxic appearing, VSS. No lymphangitic spread visible. No fluid pockets or fluctuance concerning for abscess noted. Low concern for cellulitis or osteomyelitis. No evidence of phlegmasia cerulea or alba dolens. Focal and unilateral nature not consistent with heart failure.  Workup: US Venous Doppler Lower Extremity, labs  - D/w patient and wife at bedside, shared decision making, admission vs outpatient treatment w/ DOAC. They prefer outpatient treatment with xarelto and to follow up with primary vascular surgeon. Patient is reliable and wife is RN. Return precautions given.   - Xarelto dose in the ED and Rx, to follow up with primary vascular surgeon for further evaluation and Rx.   Disposition: Discharge with appropriate follow up with vascular surgeon. Instructed patient to follow up with primary care physician in next 48 hours as well. Return precautions given.

## 2023-04-08 LAB
APTT 2H P 1:4 NP PPP: 36.2 SEC
APTT 2H P INC PPP: 36 SEC
APTT BLD: 35.6 SEC
APTT IMM NP/PRE NP PPP: 34.9 SEC
NPP NORMAL POOLED PLASMA: 32.3 SECS

## 2023-04-10 LAB
CARDIOLIPIN AB SER IA-ACNC: NEGATIVE
CONFIRM: 41.7 SEC
DRVVT IMM 1:2 NP PPP: ABNORMAL
DRVVT SCREEN TO CONFIRM RATIO: 1.66 RATIO
SCREEN DRVVT: 82.2 SEC
SILICA CLOTTING TIME INTERPRETATION: NORMAL
SILICA CLOTTING TIME S/C: 0.96 RATIO

## 2023-04-11 LAB
B2 GLYCOPROT1 IGA SERPL IA-ACNC: <5 SAU
B2 GLYCOPROT1 IGG SER-ACNC: <5 SGU
B2 GLYCOPROT1 IGM SER-ACNC: <5 SMU
CARDIOLIPIN IGM SER-MCNC: 5.9 GPL
CARDIOLIPIN IGM SER-MCNC: 9.7 MPL
DEPRECATED CARDIOLIPIN IGA SER: <5 APL

## 2023-04-12 ENCOUNTER — APPOINTMENT (OUTPATIENT)
Dept: HEMATOLOGY ONCOLOGY | Facility: CLINIC | Age: 63
End: 2023-04-12
Payer: COMMERCIAL

## 2023-04-12 PROCEDURE — 99213 OFFICE O/P EST LOW 20 MIN: CPT | Mod: 95

## 2023-04-12 NOTE — RESULTS/DATA
[FreeTextEntry1] : 10/12/22–lower extremity venous duplex study (Dr. Elena)–no evidence of DVT or deep venous insufficiency on the right lower extremity.  No evidence of DVT or deep venous insufficiency of the left lower extremity.\par \par 4/2023-LE venous duplex study-Nearly occlusive deep venous thrombosis in the right femoral, popliteal and posterior tibial veins, above and below the knee.\par The findings are new from 03/02/2023.\par

## 2023-04-12 NOTE — ASSESSMENT
[FreeTextEntry1] : Lab work, LE venous duplex study report reviewed.\par Patient underwent left knee arthroscopic surgery 8/2014. Approximately 2 weeks postoperatively, he noticed right lower extremity swelling and pain. He sought attention at an urgent care center, and had a right lower extremity ultrasound which revealed a DVT within the right popliteal, proximal and mid posterior tibial veins. He was begun on Xarelto.He had no prior history of venous thromboembolic disease nor any family history of venous thromboembolism.\par Prothrombin gene mutation study normal. APC resistance within normal limits. +LA at that time.  ROBBIE WNL.  Maintained on xarelto from 8/2014-12/2022.\par Lupus anticoagulant tested when patient off anticoagulation, and was negative in 12/2022. \par 3/2033 LE venous duplex study negative for DVT. \par \par 4/2023-RLE venous duplex study-nearly occlusive deep venous thrombosis in the right femoral, popliteal and posterior tibial veins, above and below the knee.\par The findings are new from 03/02/2023. Resumed Xarelto.\par Have discussed with patient that bleeding risks with anticoagulation are to be considered versus clotting risks without anticoagulation in ongoing decision making.  \par Discussed with patient recommended lifelong anticoagulation at this point (as long as no bleeding/contraindication). Lupus anticoagulant screen positive on f/u testing, so recommend change to Coumadin anticoagulation.  Explained ongoing thrombosis risks with phospholipid antibodies.  As per my discussion with PCP Dr. Barry 4/7/8, he will order and manage patient's Coumadin. Patient stated he will call PCP for f/u.\par \par Patient was given the opportunity to ask questions.  His questions have been answered to his apparent satisfaction at this time.  He expresses understanding and willingness to comply with recommended follow-up.\par

## 2023-04-12 NOTE — PHYSICAL EXAM
[Normal] : affect appropriate [de-identified] : non-toxic appearing [de-identified] : breathing appeared unlabored [de-identified] : coloration appeared normal

## 2023-04-12 NOTE — CONSULT LETTER
[Dear  ___] : Dear  [unfilled], [Courtesy Letter:] : I had the pleasure of seeing your patient, [unfilled], in my office today. [Please see my note below.] : Please see my note below. [Consult Closing:] : Thank you very much for allowing me to participate in the care of this patient.  If you have any questions, please do not hesitate to contact me. [Sincerely,] : Sincerely, [FreeTextEntry3] : Valerie Reid MD

## 2023-04-12 NOTE — HISTORY OF PRESENT ILLNESS
[de-identified] : Patient underwent left knee arthroscopic surgery 8/2014. Approximately 2 weeks postoperatively, he noticed right lower extremity swelling and pain. He sought attention at an urgent care center, and had a right lower extremity ultrasound which revealed a DVT within the right popliteal, proximal and mid posterior tibial veins.He was begun on Xarelto.He had no prior history of venous thromboembolic disease nor any family history of venous thromboembolism.\par Prothrombin gene mutation study normal. APC resistance within normal limits. +LA at that time.  ROBBIE WNL.  Maintained on Xarelto .\par \par 2/2018-10/2022-No hematology f/u.\par \par 10/2022-Returned for hematology f/u. 12/2022 LA screen negative (tested when off anticoagulant).\par \par Held anticoagulation 10/2022-4/2023.\par \par 4/2023-RLE venous duplex study-nearly occlusive deep venous thrombosis in the right femoral, popliteal and posterior tibial veins, above and below the knee.\par The findings are new from 03/02/2023. 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 No abnormal bleeding reported. No fevers. Still with right knee pain.\par \par

## 2023-04-12 NOTE — REASON FOR VISIT
[Follow-Up Visit] : a follow-up visit for [Home] : at home, [unfilled] , at the time of the visit. [Medical Office: ([facility])___] : at the medical office located in  [Spouse] : spouse [Patient] : the patient [Self] : self [FreeTextEntry2] : DVT

## 2023-04-13 ENCOUNTER — NON-APPOINTMENT (OUTPATIENT)
Age: 63
End: 2023-04-13

## 2023-04-13 ENCOUNTER — APPOINTMENT (OUTPATIENT)
Dept: ORTHOPEDIC SURGERY | Facility: CLINIC | Age: 63
End: 2023-04-13

## 2023-04-14 ENCOUNTER — NON-APPOINTMENT (OUTPATIENT)
Age: 63
End: 2023-04-14

## 2023-04-14 LAB
INR PPP: 1.19 RATIO
PT BLD: 13.9 SEC

## 2023-04-17 ENCOUNTER — NON-APPOINTMENT (OUTPATIENT)
Age: 63
End: 2023-04-17

## 2023-04-17 LAB
INR PPP: 1.57 RATIO
PT BLD: 18.4 SEC

## 2023-04-20 ENCOUNTER — NON-APPOINTMENT (OUTPATIENT)
Age: 63
End: 2023-04-20

## 2023-04-27 ENCOUNTER — APPOINTMENT (OUTPATIENT)
Dept: ORTHOPEDIC SURGERY | Facility: CLINIC | Age: 63
End: 2023-04-27
Payer: COMMERCIAL

## 2023-04-27 VITALS — HEIGHT: 74 IN | BODY MASS INDEX: 36.83 KG/M2 | WEIGHT: 287 LBS

## 2023-04-27 PROCEDURE — 20611 DRAIN/INJ JOINT/BURSA W/US: CPT | Mod: RT

## 2023-04-27 PROCEDURE — 99213 OFFICE O/P EST LOW 20 MIN: CPT | Mod: 25

## 2023-04-27 NOTE — PHYSICAL EXAM
[Right] : right knee [Left] : left knee [NL (0)] : extension 0 degrees [] : no extensor lag [FreeTextEntry3] : + LE swelling [FreeTextEntry8] : Calf firm, non tender [de-identified] : extension 3 degrees [TWNoteComboBox7] : flexion 120 degrees

## 2023-04-27 NOTE — PROCEDURE
[Other: ____] : [unfilled] [Right] : of the right [Knee] : knee [Effusion] : effusion [de-identified] : 15cc [de-identified] : clear/straw [FreeTextEntry3] : Large joint injection was performed of the right knee. An injection of Lidocaine 3cc of 1% , Ropivacaine 4cc of 0.5% , Triamcinolone (Kenalog) 2cc of 40 mg  was used. \par Patient was advised to call if redness, pain or fever occur and apply ice for 15 minutes out of every hour for the next 12-24 hours as tolerated. \par \par Patient has tried OTC's including aspirin, Ibuprofen, Aleve, etc or prescription NSAIDS, and/or exercises at home and/or physical therapy without satisfactory response, patient had decreased mobility in the joint and the risks benefits, and alternatives have been discussed, and verbal consent was obtained. \par The site was prepped with alcohol, betadine and ethyl chloride sprayed topically\par \par The risks, benefits and contents of the injection have been discussed.  Risks include but are not limited to allergic reaction, flare reaction, permanent white skin discoloration at the injection site and infection.  The patient understands the risks and agrees to having the injection.  All questions have been answered.\par \par Ultrasound guidance was indicated for this patient due to prior failure or difficult injection. All ultrasound images have been permanently captured and stored accordingly in our picture archiving and communication system.\par

## 2023-04-27 NOTE — HISTORY OF PRESENT ILLNESS
[7] : 7 [5] : 5 [Sharp] : sharp [Constant] : constant [4] : 4 [Orthovisc] : Orthovisc [de-identified] : 4/27/23: Here to f/up right knee. Reports after last visit he went for the doppler which did show a DVT. Was put on Xarelto and Coumadin. \par 4/6/23: Follow up for his right knee. States pain started again 3 days ago and was severe. Also c/o swelling in the leg. H/O DVT in 2015, just came off Xarelto 3 months ago. \par 1/12/23: Here for fu orthovisc #4\par 01/05/2023: Here to f/up right knee and Orthovisc # 3\par 12/29/2022: Here to f/up right knee and Orthovisc # 2\par 12/22/2022: Here to f/up right knee and Orthovisc # 1\par 12/09/2022 Mr. JAYLYN MAYER, a 62 year old male, presents today for right knee. Seen in [facility] on 11.05.22, received a csi with temporary relief. Reports continued and worsening right knee pain and stiffness. Pain with walking and increased activity. \par hx of left TKA\par Retired [] : no [FreeTextEntry1] : right knee  [FreeTextEntry5] : 04/27/23: Pt feeling improvement with CSI and Asp. Swelling reduced. Pt has dvt and was started on Xalrelto 15mg twice a day 3 weeks ago. Pt has been taking meloxicam once a day and Coumadin 3 mg once a day. \par Pt feeling a slight improvement on the Rt knee since monday but still feels uncomfortable.   [de-identified] : left tka [de-identified] : 01/05/23 [TWNoteComboBox1] : 20%

## 2023-04-27 NOTE — DISCUSSION/SUMMARY
[de-identified] : 63m right knee djd \par 1) csi / aspiration right knee today - tolerated well - 15cc\par 2) hep \par 3) cryotherapy, rest and activity modification\par 4) rtc prn\par \par Entered by Deisy Warren acting as scribe.\par Dr. Worthy-\par The documentation recorded by the scribe accurately reflects the service I personally performed and the decisions made by me.

## 2023-05-05 LAB
INR PPP: 1.76 RATIO
PT BLD: 20.5 SEC

## 2023-05-09 ENCOUNTER — APPOINTMENT (OUTPATIENT)
Dept: ORTHOPEDIC SURGERY | Facility: CLINIC | Age: 63
End: 2023-05-09
Payer: COMMERCIAL

## 2023-05-09 VITALS — BODY MASS INDEX: 36.83 KG/M2 | WEIGHT: 287 LBS | HEIGHT: 74 IN

## 2023-05-09 PROCEDURE — 99213 OFFICE O/P EST LOW 20 MIN: CPT

## 2023-05-09 NOTE — PHYSICAL EXAM
[Right] : right knee [Left] : left knee [NL (0)] : extension 0 degrees [] : no extensor lag [FreeTextEntry3] : + LE swelling [FreeTextEntry8] : Calf firm, non tender [de-identified] : extension 3 degrees [TWNoteComboBox7] : flexion 120 degrees

## 2023-05-09 NOTE — DISCUSSION/SUMMARY
[de-identified] : 63m right knee djd, interested in scheduling TKA\par 1) has consulted scheduled with Dr. Quan \par 2) cryotherapy, rest and activity modification\par 3) hep \par \par \par \par Entered by Deisy Warren acting as scribe.\par Dr. Worthy-\par The documentation recorded by the scribe accurately reflects the service I personally performed and the decisions made by me.

## 2023-05-11 ENCOUNTER — OUTPATIENT (OUTPATIENT)
Dept: OUTPATIENT SERVICES | Facility: HOSPITAL | Age: 63
LOS: 1 days | End: 2023-05-11
Payer: COMMERCIAL

## 2023-05-11 ENCOUNTER — APPOINTMENT (OUTPATIENT)
Dept: CT IMAGING | Facility: CLINIC | Age: 63
End: 2023-05-11
Payer: COMMERCIAL

## 2023-05-11 DIAGNOSIS — Z98.89 OTHER SPECIFIED POSTPROCEDURAL STATES: Chronic | ICD-10-CM

## 2023-05-11 DIAGNOSIS — Z96.652 PRESENCE OF LEFT ARTIFICIAL KNEE JOINT: Chronic | ICD-10-CM

## 2023-05-11 DIAGNOSIS — Z00.8 ENCOUNTER FOR OTHER GENERAL EXAMINATION: ICD-10-CM

## 2023-05-11 PROCEDURE — 71275 CT ANGIOGRAPHY CHEST: CPT | Mod: 26

## 2023-05-11 PROCEDURE — 71275 CT ANGIOGRAPHY CHEST: CPT

## 2023-05-17 ENCOUNTER — APPOINTMENT (OUTPATIENT)
Dept: ORTHOPEDIC SURGERY | Facility: CLINIC | Age: 63
End: 2023-05-17
Payer: COMMERCIAL

## 2023-05-17 PROCEDURE — 99214 OFFICE O/P EST MOD 30 MIN: CPT

## 2023-05-17 NOTE — ASSESSMENT
[FreeTextEntry1] : 63M p/w adv R knee OA, bone on bone medial compartment\par \par he is a candidate for TKA but has a recent PE 2 weeks ago, he is under the care of his PCP, hematologist and vascular for this. Recommend f/u with them about appropriate timing of TKA in light of recent clot, will likely require a few months of therapeutic AC before it is safe to proceed. He is planning to seek a second opinion from outide ortho, return after consulting with his medical team

## 2023-05-17 NOTE — HISTORY OF PRESENT ILLNESS
[Gradual] : gradual [10] : 10 [Dull/Aching] : dull/aching [Radiating] : radiating [Sharp] : sharp [Shooting] : shooting [Throbbing] : throbbing [Sleep] : sleep [Nothing helps with pain getting better] : Nothing helps with pain getting better [Walking] : walking [Stairs] : stairs [de-identified] : 63M p/w R knee pain. He has had pain for a few years, been doing NSAIDs and  injections both CSI and gels without relief. He has a recent history of DVT and PE. He has a h/o lupus anticoagulant factor. He uses xarelto and coumadin currently while adjusting his INR levels. [] : no [FreeTextEntry1] : right knee [FreeTextEntry3] : few years  [FreeTextEntry5] : he has pain and swelling, no injury  [FreeTextEntry7] : down the leg  [de-identified] : activity  [de-identified] : 05/09/23 [de-identified] : Dr Worthy  [de-identified] : XR

## 2023-05-17 NOTE — PHYSICAL EXAM
[Normal Sensation] : normal sensation [Normal Mood and Affect] : normal mood and affect [Orientated] : orientated [Able to Communicate] : able to communicate [Well Developed] : well developed [Well Nourished] : well nourished [Right] : right knee [advanced tricompartmental OA with medial compartment narrowing and varus alignment] : advanced tricompartmental OA with medial compartment narrowing and varus alignment [NL (140)] : flexion 140 degrees [NL (0)] : extension 0 degrees [5___] : hamstring 5[unfilled]/5 [] : ambulation with cane

## 2023-05-18 ENCOUNTER — NON-APPOINTMENT (OUTPATIENT)
Age: 63
End: 2023-05-18

## 2023-05-23 ENCOUNTER — APPOINTMENT (OUTPATIENT)
Dept: PAIN MANAGEMENT | Facility: CLINIC | Age: 63
End: 2023-05-23
Payer: COMMERCIAL

## 2023-05-23 VITALS — HEIGHT: 74 IN | WEIGHT: 287 LBS | BODY MASS INDEX: 36.83 KG/M2

## 2023-05-23 DIAGNOSIS — M17.11 UNILATERAL PRIMARY OSTEOARTHRITIS, RIGHT KNEE: ICD-10-CM

## 2023-05-23 PROCEDURE — 99204 OFFICE O/P NEW MOD 45 MIN: CPT

## 2023-05-23 NOTE — PHYSICAL EXAM
[de-identified] : Constitutional; Appears well, no apparent distress\par Ability to communicate: Normal \par Respiratory: non-labored breathing\par Skin: No rash noted\par Head: Normocephalic, atraumatic\par Neck: no visible thyroid enlargement\par Eyes: Extraocular movements intact\par Neurologic: Alert and oriented x3\par Psychiatric: normal mood, affect and behavior\par

## 2023-05-23 NOTE — DISCUSSION/SUMMARY
[de-identified] : After discussing various treatment options with the patient including but not limited to oral medications, physical therapy, exercise modalities as well as interventional spinal injections, we have decided with the following plan:\par \par - Continue home exercises, stretching, activity modification, physical therapy, and conservative care.\par - Follow-up as needed.\par - Will prescribe Oxycodone/Acetaminophen 5/325 Q6hrs PRN #28 for pain control. I have consulted the  Registry for the purpose of reviewing the patient's controlled substances. Risks of opioid use for chronic non-cancer pain discussed at length, including development of tolerance, addiction, opioid-induced hyperalgesia, hypogonadism, disturbance of sleep, etc. Explained that current medical evidence does not support the long-term use of opioids for this patient's condition, and that our plan is to eventually discontinue opioids altogether.

## 2023-05-23 NOTE — HISTORY OF PRESENT ILLNESS
[10] : 10 [Nothing helps with pain getting better] : Nothing helps with pain getting better [] : yes [FreeTextEntry1] : right knee [FreeTextEntry7] : rgihg thigh and calf

## 2023-06-05 ENCOUNTER — INPATIENT (INPATIENT)
Facility: HOSPITAL | Age: 63
LOS: 3 days | Discharge: HOME | DRG: 853 | End: 2023-06-09
Attending: STUDENT IN AN ORGANIZED HEALTH CARE EDUCATION/TRAINING PROGRAM | Admitting: STUDENT IN AN ORGANIZED HEALTH CARE EDUCATION/TRAINING PROGRAM
Payer: COMMERCIAL

## 2023-06-05 VITALS
SYSTOLIC BLOOD PRESSURE: 136 MMHG | TEMPERATURE: 99 F | DIASTOLIC BLOOD PRESSURE: 81 MMHG | RESPIRATION RATE: 24 BRPM | OXYGEN SATURATION: 91 % | HEIGHT: 72 IN | HEART RATE: 123 BPM | WEIGHT: 285.06 LBS

## 2023-06-05 DIAGNOSIS — Z98.89 OTHER SPECIFIED POSTPROCEDURAL STATES: Chronic | ICD-10-CM

## 2023-06-05 DIAGNOSIS — A41.9 SEPSIS, UNSPECIFIED ORGANISM: ICD-10-CM

## 2023-06-05 DIAGNOSIS — Z29.9 ENCOUNTER FOR PROPHYLACTIC MEASURES, UNSPECIFIED: ICD-10-CM

## 2023-06-05 DIAGNOSIS — J18.9 PNEUMONIA, UNSPECIFIED ORGANISM: ICD-10-CM

## 2023-06-05 DIAGNOSIS — I82.90 ACUTE EMBOLISM AND THROMBOSIS OF UNSPECIFIED VEIN: ICD-10-CM

## 2023-06-05 DIAGNOSIS — Z96.652 PRESENCE OF LEFT ARTIFICIAL KNEE JOINT: Chronic | ICD-10-CM

## 2023-06-05 DIAGNOSIS — M19.90 UNSPECIFIED OSTEOARTHRITIS, UNSPECIFIED SITE: ICD-10-CM

## 2023-06-05 DIAGNOSIS — J44.9 CHRONIC OBSTRUCTIVE PULMONARY DISEASE, UNSPECIFIED: ICD-10-CM

## 2023-06-05 DIAGNOSIS — J44.1 CHRONIC OBSTRUCTIVE PULMONARY DISEASE WITH (ACUTE) EXACERBATION: ICD-10-CM

## 2023-06-05 DIAGNOSIS — I26.99 OTHER PULMONARY EMBOLISM WITHOUT ACUTE COR PULMONALE: ICD-10-CM

## 2023-06-05 LAB
APPEARANCE UR: CLEAR — SIGNIFICANT CHANGE UP
APTT BLD: 25 SEC — LOW (ref 27.5–35.5)
BASE EXCESS BLDV CALC-SCNC: 2.7 MMOL/L — SIGNIFICANT CHANGE UP (ref -2–3)
BASOPHILS # BLD AUTO: 0.04 K/UL — SIGNIFICANT CHANGE UP (ref 0–0.2)
BASOPHILS NFR BLD AUTO: 0.3 % — SIGNIFICANT CHANGE UP (ref 0–2)
BILIRUB UR-MCNC: NEGATIVE — SIGNIFICANT CHANGE UP
CA-I SERPL-SCNC: 1.11 MMOL/L — LOW (ref 1.15–1.33)
CHLORIDE BLDV-SCNC: 103 MMOL/L — SIGNIFICANT CHANGE UP (ref 96–108)
CK MB CFR SERPL CALC: 1.8 NG/ML — SIGNIFICANT CHANGE UP (ref 0–6.7)
CK SERPL-CCNC: 86 U/L — SIGNIFICANT CHANGE UP (ref 30–200)
CO2 BLDV-SCNC: 29 MMOL/L — HIGH (ref 22–26)
COLOR SPEC: SIGNIFICANT CHANGE UP
DIFF PNL FLD: NEGATIVE — SIGNIFICANT CHANGE UP
EOSINOPHIL # BLD AUTO: 0.11 K/UL — SIGNIFICANT CHANGE UP (ref 0–0.5)
EOSINOPHIL NFR BLD AUTO: 0.7 % — SIGNIFICANT CHANGE UP (ref 0–6)
GAS PNL BLDV: 135 MMOL/L — LOW (ref 136–145)
GAS PNL BLDV: SIGNIFICANT CHANGE UP
GLUCOSE BLDV-MCNC: 145 MG/DL — HIGH (ref 70–99)
GLUCOSE UR QL: ABNORMAL
HCO3 BLDV-SCNC: 28 MMOL/L — SIGNIFICANT CHANGE UP (ref 22–29)
HCT VFR BLD CALC: 41.5 % — SIGNIFICANT CHANGE UP (ref 39–50)
HCT VFR BLDA CALC: 41 % — SIGNIFICANT CHANGE UP (ref 39–51)
HGB BLD CALC-MCNC: 13.6 G/DL — SIGNIFICANT CHANGE UP (ref 12.6–17.4)
HGB BLD-MCNC: 13.2 G/DL — SIGNIFICANT CHANGE UP (ref 13–17)
IMM GRANULOCYTES NFR BLD AUTO: 0.3 % — SIGNIFICANT CHANGE UP (ref 0–0.9)
INR BLD: 1.27 RATIO — HIGH (ref 0.88–1.16)
KETONES UR-MCNC: NEGATIVE — SIGNIFICANT CHANGE UP
LACTATE BLDV-MCNC: 1.6 MMOL/L — SIGNIFICANT CHANGE UP (ref 0.5–2)
LEUKOCYTE ESTERASE UR-ACNC: NEGATIVE — SIGNIFICANT CHANGE UP
LYMPHOCYTES # BLD AUTO: 0.68 K/UL — LOW (ref 1–3.3)
LYMPHOCYTES # BLD AUTO: 4.3 % — LOW (ref 13–44)
MCHC RBC-ENTMCNC: 29.8 PG — SIGNIFICANT CHANGE UP (ref 27–34)
MCHC RBC-ENTMCNC: 31.8 GM/DL — LOW (ref 32–36)
MCV RBC AUTO: 93.7 FL — SIGNIFICANT CHANGE UP (ref 80–100)
MONOCYTES # BLD AUTO: 1.36 K/UL — HIGH (ref 0–0.9)
MONOCYTES NFR BLD AUTO: 8.6 % — SIGNIFICANT CHANGE UP (ref 2–14)
NEUTROPHILS # BLD AUTO: 13.52 K/UL — HIGH (ref 1.8–7.4)
NEUTROPHILS NFR BLD AUTO: 85.8 % — HIGH (ref 43–77)
NITRITE UR-MCNC: NEGATIVE — SIGNIFICANT CHANGE UP
NRBC # BLD: 0 /100 WBCS — SIGNIFICANT CHANGE UP (ref 0–0)
PCO2 BLDV: 44 MMHG — SIGNIFICANT CHANGE UP (ref 42–55)
PH BLDV: 7.41 — SIGNIFICANT CHANGE UP (ref 7.32–7.43)
PH UR: 6 — SIGNIFICANT CHANGE UP (ref 5–8)
PLATELET # BLD AUTO: 251 K/UL — SIGNIFICANT CHANGE UP (ref 150–400)
PO2 BLDV: 51 MMHG — HIGH (ref 25–45)
POTASSIUM BLDV-SCNC: 4 MMOL/L — SIGNIFICANT CHANGE UP (ref 3.5–5.1)
PROCALCITONIN SERPL-MCNC: 0.07 NG/ML — SIGNIFICANT CHANGE UP (ref 0.02–0.1)
PROT UR-MCNC: NEGATIVE — SIGNIFICANT CHANGE UP
PROTHROM AB SERPL-ACNC: 14.8 SEC — HIGH (ref 10.5–13.4)
RAPID RVP RESULT: SIGNIFICANT CHANGE UP
RBC # BLD: 4.43 M/UL — SIGNIFICANT CHANGE UP (ref 4.2–5.8)
RBC # FLD: 14 % — SIGNIFICANT CHANGE UP (ref 10.3–14.5)
SAO2 % BLDV: 86.6 % — SIGNIFICANT CHANGE UP (ref 67–88)
SARS-COV-2 RNA SPEC QL NAA+PROBE: SIGNIFICANT CHANGE UP
SP GR SPEC: 1.02 — SIGNIFICANT CHANGE UP (ref 1.01–1.02)
TROPONIN T, HIGH SENSITIVITY RESULT: 7 NG/L — SIGNIFICANT CHANGE UP (ref 0–51)
UROBILINOGEN FLD QL: NEGATIVE — SIGNIFICANT CHANGE UP
WBC # BLD: 15.76 K/UL — HIGH (ref 3.8–10.5)
WBC # FLD AUTO: 15.76 K/UL — HIGH (ref 3.8–10.5)

## 2023-06-05 PROCEDURE — 71275 CT ANGIOGRAPHY CHEST: CPT | Mod: 26

## 2023-06-05 PROCEDURE — 99285 EMERGENCY DEPT VISIT HI MDM: CPT

## 2023-06-05 PROCEDURE — 93308 TTE F-UP OR LMTD: CPT | Mod: 26

## 2023-06-05 PROCEDURE — 93970 EXTREMITY STUDY: CPT | Mod: 26

## 2023-06-05 PROCEDURE — 71045 X-RAY EXAM CHEST 1 VIEW: CPT | Mod: 26

## 2023-06-05 PROCEDURE — 99223 1ST HOSP IP/OBS HIGH 75: CPT

## 2023-06-05 RX ORDER — AZITHROMYCIN 500 MG/1
TABLET, FILM COATED ORAL
Refills: 0 | Status: DISCONTINUED | OUTPATIENT
Start: 2023-06-05 | End: 2023-06-08

## 2023-06-05 RX ORDER — SODIUM CHLORIDE 9 MG/ML
500 INJECTION INTRAMUSCULAR; INTRAVENOUS; SUBCUTANEOUS ONCE
Refills: 0 | Status: COMPLETED | OUTPATIENT
Start: 2023-06-05 | End: 2023-06-05

## 2023-06-05 RX ORDER — WARFARIN SODIUM 2.5 MG/1
5 TABLET ORAL ONCE
Refills: 0 | Status: COMPLETED | OUTPATIENT
Start: 2023-06-05 | End: 2023-06-05

## 2023-06-05 RX ORDER — CEFEPIME 1 G/1
2000 INJECTION, POWDER, FOR SOLUTION INTRAMUSCULAR; INTRAVENOUS ONCE
Refills: 0 | Status: COMPLETED | OUTPATIENT
Start: 2023-06-05 | End: 2023-06-05

## 2023-06-05 RX ORDER — ACETAMINOPHEN 500 MG
650 TABLET ORAL EVERY 6 HOURS
Refills: 0 | Status: DISCONTINUED | OUTPATIENT
Start: 2023-06-05 | End: 2023-06-09

## 2023-06-05 RX ORDER — CEFTRIAXONE 500 MG/1
1000 INJECTION, POWDER, FOR SOLUTION INTRAMUSCULAR; INTRAVENOUS EVERY 24 HOURS
Refills: 0 | Status: DISCONTINUED | OUTPATIENT
Start: 2023-06-05 | End: 2023-06-09

## 2023-06-05 RX ORDER — IPRATROPIUM/ALBUTEROL SULFATE 18-103MCG
3 AEROSOL WITH ADAPTER (GRAM) INHALATION ONCE
Refills: 0 | Status: COMPLETED | OUTPATIENT
Start: 2023-06-05 | End: 2023-06-05

## 2023-06-05 RX ORDER — BUDESONIDE AND FORMOTEROL FUMARATE DIHYDRATE 160; 4.5 UG/1; UG/1
2 AEROSOL RESPIRATORY (INHALATION)
Refills: 0 | Status: DISCONTINUED | OUTPATIENT
Start: 2023-06-05 | End: 2023-06-09

## 2023-06-05 RX ORDER — ATORVASTATIN CALCIUM 80 MG/1
20 TABLET, FILM COATED ORAL AT BEDTIME
Refills: 0 | Status: DISCONTINUED | OUTPATIENT
Start: 2023-06-05 | End: 2023-06-09

## 2023-06-05 RX ORDER — AZITHROMYCIN 500 MG/1
500 TABLET, FILM COATED ORAL ONCE
Refills: 0 | Status: COMPLETED | OUTPATIENT
Start: 2023-06-05 | End: 2023-06-05

## 2023-06-05 RX ORDER — MELOXICAM 15 MG/1
1 TABLET ORAL
Qty: 0 | Refills: 0 | DISCHARGE

## 2023-06-05 RX ORDER — TIOTROPIUM BROMIDE 18 UG/1
2 CAPSULE ORAL; RESPIRATORY (INHALATION) DAILY
Refills: 0 | Status: DISCONTINUED | OUTPATIENT
Start: 2023-06-05 | End: 2023-06-09

## 2023-06-05 RX ORDER — PANTOPRAZOLE SODIUM 20 MG/1
40 TABLET, DELAYED RELEASE ORAL
Refills: 0 | Status: DISCONTINUED | OUTPATIENT
Start: 2023-06-05 | End: 2023-06-09

## 2023-06-05 RX ORDER — AZITHROMYCIN 500 MG/1
500 TABLET, FILM COATED ORAL EVERY 24 HOURS
Refills: 0 | Status: DISCONTINUED | OUTPATIENT
Start: 2023-06-06 | End: 2023-06-08

## 2023-06-05 RX ORDER — ENOXAPARIN SODIUM 100 MG/ML
130 INJECTION SUBCUTANEOUS EVERY 12 HOURS
Refills: 0 | Status: DISCONTINUED | OUTPATIENT
Start: 2023-06-05 | End: 2023-06-07

## 2023-06-05 RX ORDER — IPRATROPIUM/ALBUTEROL SULFATE 18-103MCG
3 AEROSOL WITH ADAPTER (GRAM) INHALATION
Refills: 0 | Status: COMPLETED | OUTPATIENT
Start: 2023-06-05 | End: 2023-06-05

## 2023-06-05 RX ORDER — LIDOCAINE 4 G/100G
1 CREAM TOPICAL DAILY
Refills: 0 | Status: DISCONTINUED | OUTPATIENT
Start: 2023-06-05 | End: 2023-06-09

## 2023-06-05 RX ORDER — FLUTICASONE FUROATE, UMECLIDINIUM BROMIDE AND VILANTEROL TRIFENATATE 200; 62.5; 25 UG/1; UG/1; UG/1
1 POWDER RESPIRATORY (INHALATION)
Qty: 0 | Refills: 0 | DISCHARGE

## 2023-06-05 RX ORDER — IPRATROPIUM/ALBUTEROL SULFATE 18-103MCG
3 AEROSOL WITH ADAPTER (GRAM) INHALATION EVERY 6 HOURS
Refills: 0 | Status: DISCONTINUED | OUTPATIENT
Start: 2023-06-05 | End: 2023-06-08

## 2023-06-05 RX ORDER — ACETAMINOPHEN 500 MG
975 TABLET ORAL ONCE
Refills: 0 | Status: COMPLETED | OUTPATIENT
Start: 2023-06-05 | End: 2023-06-05

## 2023-06-05 RX ADMIN — Medication 975 MILLIGRAM(S): at 09:03

## 2023-06-05 RX ADMIN — Medication 3 MILLILITER(S): at 09:55

## 2023-06-05 RX ADMIN — CEFTRIAXONE 100 MILLIGRAM(S): 500 INJECTION, POWDER, FOR SOLUTION INTRAMUSCULAR; INTRAVENOUS at 17:51

## 2023-06-05 RX ADMIN — Medication 125 MILLIGRAM(S): at 09:03

## 2023-06-05 RX ADMIN — CEFEPIME 100 MILLIGRAM(S): 1 INJECTION, POWDER, FOR SOLUTION INTRAMUSCULAR; INTRAVENOUS at 07:05

## 2023-06-05 RX ADMIN — Medication 3 MILLILITER(S): at 09:02

## 2023-06-05 RX ADMIN — BUDESONIDE AND FORMOTEROL FUMARATE DIHYDRATE 2 PUFF(S): 160; 4.5 AEROSOL RESPIRATORY (INHALATION) at 18:54

## 2023-06-05 RX ADMIN — AZITHROMYCIN 500 MILLIGRAM(S): 500 TABLET, FILM COATED ORAL at 16:42

## 2023-06-05 RX ADMIN — SODIUM CHLORIDE 500 MILLILITER(S): 9 INJECTION INTRAMUSCULAR; INTRAVENOUS; SUBCUTANEOUS at 07:19

## 2023-06-05 RX ADMIN — Medication 3 MILLILITER(S): at 07:19

## 2023-06-05 RX ADMIN — WARFARIN SODIUM 5 MILLIGRAM(S): 2.5 TABLET ORAL at 21:18

## 2023-06-05 RX ADMIN — Medication 3 MILLILITER(S): at 18:54

## 2023-06-05 RX ADMIN — ENOXAPARIN SODIUM 130 MILLIGRAM(S): 100 INJECTION SUBCUTANEOUS at 18:53

## 2023-06-05 RX ADMIN — Medication 3 MILLILITER(S): at 09:40

## 2023-06-05 RX ADMIN — ATORVASTATIN CALCIUM 20 MILLIGRAM(S): 80 TABLET, FILM COATED ORAL at 21:18

## 2023-06-05 NOTE — ED PROVIDER NOTE - NSICDXPASTMEDICALHX_GEN_ALL_CORE_FT
PAST MEDICAL HISTORY:  Basal cell carcinoma removed    Bronchitis winter 2014    COPD (chronic obstructive pulmonary disease)     DVT (deep venous thrombosis) "right lower extremity,after meniscus repair  08/2014 & Had Hemato consult (Prothrombin gene mutation study was normal+LA),treated with Xarelto" & currently on xarelto.    GERD (gastroesophageal reflux disease)     Hyperlipidemia     Pneumonia      PAST MEDICAL HISTORY:  Basal cell carcinoma removed    Bronchitis winter 2014    COPD (chronic obstructive pulmonary disease)     DVT (deep venous thrombosis) "right lower extremity,after meniscus repair  08/2014 & Had Hemato consult (Prothrombin gene mutation study was normal+LA),treated with Xarelto" & currently on xarelto.    GERD (gastroesophageal reflux disease)     Hyperlipidemia     Pneumonia     Prostate cancer

## 2023-06-05 NOTE — ED ADULT NURSE NOTE - OBJECTIVE STATEMENT
62yo M w/ PMH HLD, PE (on warfarin/coumadin) presents to ED c/o shortness of breath. Pt states around 1am he began to have SOB, has home o2 as needed, took his pulse ox was 88% than placed himself on 6L nasal cannula. Also notes having chills w/ fever and a max temp of 101.1 around 3am and took 1gram of Tylenol. Denies chest pain, N/V/D, weakness, dizziness, lightheadedness, blood in stools, hematuria, dysuria, urinary frequency, sick contacts. A&Ox3. Strong peripheral pulses. Neurologically intact and follows commands. Breathing spontaneously, nonlabored, chest rise symmetrical. Abdomen soft, nondistended, nontender to palpation. Sinus tachycardic on cardiac monitor. Skin warm dry intact and normal for ethnicity. Ambulatory with steady gait in ED. Stretcher locked and in lowest position, appropriate side rails up. Pt instructed to notify RN if assistance is needed.

## 2023-06-05 NOTE — H&P ADULT - PROBLEM SELECTOR PLAN 3
appreciate pulm c/s  steroids inhaled/systemic per pulm  abx as above appreciate pulm c/s  steroids inhaled/systemic per pulm.  As improved after initial ED treatment will give oral prednisone for now  abx as above

## 2023-06-05 NOTE — ED PROVIDER NOTE - CLINICAL SUMMARY MEDICAL DECISION MAKING FREE TEXT BOX
63-year-old male with history of prostate cancer, multiple DVT/PE, currently on Coumadin however recent supratherapeutic INR to 4, now off Coumadin for past few days, presenting to the emergency department for increasing cough and dyspnea at home, and fevers.  Exam as above.  Denies chest pain.  Exam largely consistent with COPD exacerbation versus pneumonia.  Patient with history of multiple episodes of pneumonia over the past 6 months.  Patient hypoxic to 80s on room air.  90% on nasal cannula after DuoNebs and steroids.  Will provide antibiotics for presumed pneumonia.  CT scan pending.  Will admit to hospitalist. 63-year-old male with history of prostate cancer, multiple DVT/PE, currently on Coumadin however recent supratherapeutic INR to 4, now off Coumadin for past few days, presenting to the emergency department for increasing cough and dyspnea at home, and fevers.  Exam as above.  Denies chest pain.  Exam largely consistent with COPD exacerbation versus pneumonia.  Patient with history of multiple episodes of pneumonia over the past 6 months.  Patient hypoxic to 80s on room air.  90% on nasal cannula after DuoNebs and steroids.  Will provide antibiotics for presumed pneumonia.  CT scan pending.  Will admit to hospitalist.  Attending Jennifer Solomon: 63-year-old male with history of PEs in the past on blood thinners presenting with shortness of breath and difficulty breathing as well as fever.  Upon arrival patient with increased work of breathing and hypoxic requiring supplemental oxygen.  Point-of-care ultrasound performed showing scattered B-lines on the right side with anterior pleural thickening.  Patient denies any cough.  Patient pancultured and started on antibiotics for concern for possible pneumonia.  Patient has been compliant with his blood thinners making worsening PE less likely however will check patient's INR is a 70-minute.  Patient with a history of prior smoking will give DuoNebs as well.  Bilateral lung sliding as well as bilateral breath sounds making pneumothorax less likely.  Patient will need admission.

## 2023-06-05 NOTE — H&P ADULT - PROBLEM SELECTOR PLAN 2
acute bacterial pneumonia.  patient with recurrent pneumonias-unclear why  appreciate pulm c/s  agree with azithro/ceftriaxone (6/5-)  check MRSA nasal PCR, sputum, culture, blood culture, strep Ag, urine legionella acute bacterial pneumonia.  patient with recurrent pneumonias-unclear why  appreciate pulm c/s  agree with azithro/ceftriaxone (6/5-)  check MRSA nasal PCR, sputum, culture, blood culture, strep Ag, urine legionella  check ANCA and Immunoglobulins for recurrent PNA infections.  Anatomical region appears different this CT from prior-can f/u with pulm on utility of bronch

## 2023-06-05 NOTE — H&P ADULT - PROBLEM SELECTOR PLAN 6
PT c/s PT c/s  I updated wife who is an RN Brianda @ [phone] at patients request.  She is requesting a call daily.

## 2023-06-05 NOTE — PATIENT PROFILE ADULT - FUNCTIONAL ASSESSMENT - BASIC MOBILITY 6.
4-calculated by average/Not able to assess (calculate score using [facility] averaging method)

## 2023-06-05 NOTE — H&P ADULT - NSHPSOCIALHISTORY_GEN_ALL_CORE
former smoker, quit 8/2022  previously 40 pack year history of tobacco  occasional ETOH  no drug use  retired from construction  lives with wife  uses cane for his knee pain

## 2023-06-05 NOTE — ED PROVIDER NOTE - OBJECTIVE STATEMENT
Attending Jennifer Solomon: 63-year-old male with complex past medical history including history of prostate CA, DVTs in the past was initially on Xarelto and is now currently on Coumadin presenting with fever and shortness of breath.  Patient states yesterday he was doing well.  This morning woke up and felt not well and then developed chills and fever at home.  Took Tylenol at approximately 3 AM.  Came to the emergency department for worsening just not feeling well and difficulty breathing.  Upon arrival patient with low-grade fever and found to be hypoxic and placed on supplemental oxygen.  Patient denies any recent travel.  Denies any chest pain.  Denies any sore throat or difficulty swallowing.  No neck pain.  Denies any abdominal pain.  Known no leg pain.  No cramping.

## 2023-06-05 NOTE — ED ADULT NURSE REASSESSMENT NOTE - NS ED NURSE REASSESS COMMENT FT1
Patient is A+Ox3, sitting up in stretcher. Breathing spontaneous and unlabored on nasal cannula, tolerating nasal cannula well. Speaking in full sentences without difficulty. Sinus tachycardia on cardiac monitor. Call bell within reach. Wife at bedside.

## 2023-06-05 NOTE — CHART NOTE - NSCHARTNOTEFT_GEN_A_CORE
PULMONARY CHART NOTE      HPI: 62 y/o M well known to our service (office pt) with PMH of prostate CA, DVTs in the past was initially on Xarelto and is now currently on Coumadin (+APLS), recent PNA about 1 month ago (s/p Ceftin), recent dx PE. Presents with fever, SOB and hypoxia. CTA chest non diagnostic for PE, + R sided PNA. Pts INR level has been difficult to control as an outpatient.         Suggestions:  -Start Ceftraixone  -Check urine legionella  -Check sputum culture if able   -Start solumedrol 20mg IVP q8h for now, will re-evaluate further taper   -Start Duoneb q6h  -Start Symbicort 160mcg/4.5mcg 2 puffs BID  -Start Spiriva 2 puffs qd  -Monitor fever curve  -Keep sats >90% with o2 PRN (baseline 2-3LNC PRN)  -Consider Lovenox 1mg/kg BID until INR therapeutic        FULL CONSULT TO FOLLOW TOMORROW 6/6  discussed with [facility]  [phone] PULMONARY CHART NOTE      HPI: 62 y/o M well known to our service (office pt) with PMH of prostate CA, DVTs in the past was initially on Xarelto and is now currently on Coumadin (+APLS), recent PNA about 1 month ago (s/p Ceftin), recent dx PE. Presents with fever, SOB and hypoxia. CTA chest non diagnostic for PE, + R sided PNA. Pts INR level has been difficult to control as an outpatient.       < from: CT Angio Chest PE Protocol w/ IV Cont (06.05.23 @ 12:35) >    FINDINGS:    LUNGS AND AIRWAYS: Patent central airways.  Mild emphysema. There are   patchy right upper and lower lobe groundglass opacities and branching   nodular opacities in the right lower lobe. Triangular 5 x 11 mm   Perifissural nodule nodule in the left lower lobe likely a lymph node.  PLEURA: No pleural effusion.  MEDIASTINUM AND PAUL: No lymphadenopathy.  VESSELS: Nondiagnostic study for evaluation of pulmonary embolism. There   is inadequate contrast bolus in the pulmonary arteries large contrast   bolus remains in the SVC. There is normal caliber.  HEART: Heart size is normal. No pericardial effusion.  CHEST WALL AND LOWER NECK: Within normal limits.  VISUALIZED UPPER ABDOMEN: Within normal limits.  BONES: Within normal limits.    IMPRESSION:  Nondiagnostic study for pulmonary embolism evaluation.    There are airspace opacities in the right upper and lower lobe concerning   for pneumonia.        < end of copied text >            Suggestions:  -Start Ceftraixone  -Check urine legionella  -Check sputum culture if able   -Start solumedrol 20mg IVP q8h for now, will re-evaluate further taper   -Start Duoneb q6h  -Start Symbicort 160mcg/4.5mcg 2 puffs BID  -Start Spiriva 2 puffs qd  -Monitor fever curve  -Keep sats >90% with o2 PRN (baseline 2-3LNC PRN)  -LE duplex ordered   -Consider Lovenox 1mg/kg BID until INR therapeutic  -Will consider repeating CTA chest tomorrow.         FULL CONSULT TO FOLLOW TOMORROW 6/6  discussed with [facility]  [phone]

## 2023-06-05 NOTE — H&P ADULT - NSHPLABSRESULTS_GEN_ALL_CORE
< from: CT Angio Chest PE Protocol w/ IV Cont (06.05.23 @ 12:35) >    IMPRESSION:  Nondiagnostic study for pulmonary embolism evaluation.    There are airspace opacities in the right upper and lower lobe concerning   for pneumonia.    < end of copied text >

## 2023-06-05 NOTE — ED PROVIDER NOTE - PHYSICAL EXAMINATION
Attending Jennifer Solomon: Gen: NAD, heent: atrauamtic, eomi, perrla, mmm, op pink, uvula midline, neck; nttp, no nuchal rigidity, chest: nttp, no crepitus, cv: tachycardic, lungs: scattered wheezing bilaterally crackles on right, abd: soft, nontender, nondistended, no peritoneal signs, +, no guarding, ext: wwp,bl le edema, skin: no rash, neuro: awake and alert, following commands, speech clear, sensation and strength intact, no focal deficits

## 2023-06-05 NOTE — H&P ADULT - PROBLEM SELECTOR PLAN 1
febrile, tachy, wbc on admission.  Not severe as no end organ damage and lactate 1.6  received abx and 500cc ns.  treat pna as below  Nasal canula 2L to maintain saturation>88% febrile, tachy, wbc on admission.  Not severe as no end organ damage and lactate 1.6  received abx and 500cc ns.  treat pna as below  Nasal canula 2L to maintain saturation>88%    unlikely cardiac as BNP <36, trop 7 and no overload on CXR/CT

## 2023-06-05 NOTE — H&P ADULT - PROBLEM SELECTOR PLAN 4
has known recent VTE.  Unclear why he was transitioned from xarelto to coumadin as outpatient.   F/u with his outpatient heme  will dose lovenox 1mg/kg bid while INR is subtherapeutic  dose coumadin 5mg tonight has known recent VTE.  Per wife, he was treated on xarelto for years given h/o LA that was positive, then negative.  Initial clot was after knee surgery 2014.  He stopped xarelto ~November 2022 and within 3 months had developed dvt.  Was resumed on xarelto but then had PE and was transitioned to Coumadin.  However this was done via an outpatient xarelto coumadin bridge and he was not therapeutic for several weeks where he was maintained on both drugs.  He then was supratherapeutic on coumadin with INR>4 and coumadin was held until night prior to admission.  obtain heme consult given difficult dosing with coumadin and unclear if this is the best medication choice for him  will dose lovenox 1mg/kg bid while INR is subtherapeutic  dose coumadin 5mg tonight while pending heme recs  I sent teams message to fellow Dr Merrill

## 2023-06-05 NOTE — H&P ADULT - HISTORY OF PRESENT ILLNESS
64yo M pmh COPD (on intermittent home O2 2L prn-uses about 1x monthly), DVT/PE on coumadin (transitioned from xarelto 6 weeks ago, recently SUPRATHERAPEUTIC with doses held until day PTA), prostate cancer planning for radiation, Right knee OA, s/p L knee replacement 2014, multiple recurrent pneumonias in past 6 months was in his usual state of health until about 11pm last night when he noted a hard time breathing for which he used his home oxygen.  He also had chills/sweats and a fever to 101 at 5am.  He had taken 3 tylenol, but his pulse ox was 80% on 4L O2 so he came to ER. Reports dry cough where he feels there is mucus he can't get out.   Presentation is similar to prior pneumonias per patient.    In the ED received azithro, cefepime, 500 NS, tylenol, duoneb, solumedrol

## 2023-06-05 NOTE — H&P ADULT - RESPIRATORY
clear to auscultation bilaterally/no respiratory distress/no use of accessory muscles/breath sounds equal/respirations non-labored

## 2023-06-05 NOTE — H&P ADULT - GASTROINTESTINAL
Statement Selected normal/soft/nontender/nondistended/normal active bowel sounds/no guarding/no rigidity

## 2023-06-05 NOTE — H&P ADULT - NSICDXPASTMEDICALHX_GEN_ALL_CORE_FT
PAST MEDICAL HISTORY:  Basal cell carcinoma removed    Bronchitis winter 2014    COPD (chronic obstructive pulmonary disease)     DVT (deep venous thrombosis) "right lower extremity,after meniscus repair  08/2014 & Had Hemato consult (Prothrombin gene mutation study was normal+LA),treated with Xarelto" & currently on xarelto.    GERD (gastroesophageal reflux disease)     Hyperlipidemia     Pneumonia     Prostate cancer

## 2023-06-05 NOTE — ED PROVIDER NOTE - ATTENDING CONTRIBUTION TO CARE
patient
Attending MD Jennifer Solomon:  I personally have seen and examined this patient.  Resident note reviewed and agree on plan of care and except where noted.  See HPI, PE, and MDM for details.

## 2023-06-05 NOTE — H&P ADULT - COMMENTS
2L Gen: +weight gain due to knee OA limiting activity, + fever, +chills  Neuro: no syncope/HA  Psych: no depression, some anxiety with recent health problems  ENT: no dysphagia/odynophagia  CV: no CP/palpitations  Resp: +SOB, occasional cough that is dry  Abd: no n/v/d/c  : no dysuria  MSK: R knee pain  Heme: no bleeding  Endo: no hot/cold intolerance

## 2023-06-05 NOTE — H&P ADULT - ASSESSMENT
62yo M pmh COPD (on intermittent home O2 2L prn-uses about 1x monthly), DVT/PE on coumadin (transitioned from xarelto 6 weeks ago, recently SUPRATHERAPEUTIC with doses held until day PTA), prostate cancer planning for radiation, Right knee OA, s/p L knee replacement 2014, multiple recurrent pneumonias in past 6 months presents 6/5 with acute hypoxic respiratory failure due to suspected acute bacterial pneumonia improving with abx in ED.

## 2023-06-06 DIAGNOSIS — I26.99 OTHER PULMONARY EMBOLISM WITHOUT ACUTE COR PULMONALE: ICD-10-CM

## 2023-06-06 DIAGNOSIS — I82.409 ACUTE EMBOLISM AND THROMBOSIS OF UNSPECIFIED DEEP VEINS OF UNSPECIFIED LOWER EXTREMITY: ICD-10-CM

## 2023-06-06 DIAGNOSIS — J44.1 CHRONIC OBSTRUCTIVE PULMONARY DISEASE WITH (ACUTE) EXACERBATION: ICD-10-CM

## 2023-06-06 LAB
ANION GAP SERPL CALC-SCNC: 13 MMOL/L — SIGNIFICANT CHANGE UP (ref 5–17)
BASOPHILS # BLD AUTO: 0.03 K/UL — SIGNIFICANT CHANGE UP (ref 0–0.2)
BASOPHILS NFR BLD AUTO: 0.1 % — SIGNIFICANT CHANGE UP (ref 0–2)
BUN SERPL-MCNC: 13 MG/DL — SIGNIFICANT CHANGE UP (ref 7–23)
CALCIUM SERPL-MCNC: 8.3 MG/DL — LOW (ref 8.4–10.5)
CHLORIDE SERPL-SCNC: 106 MMOL/L — SIGNIFICANT CHANGE UP (ref 96–108)
CO2 SERPL-SCNC: 21 MMOL/L — LOW (ref 22–31)
CREAT SERPL-MCNC: 0.63 MG/DL — SIGNIFICANT CHANGE UP (ref 0.5–1.3)
EGFR: 106 ML/MIN/1.73M2 — SIGNIFICANT CHANGE UP
EOSINOPHIL # BLD AUTO: 0 K/UL — SIGNIFICANT CHANGE UP (ref 0–0.5)
EOSINOPHIL NFR BLD AUTO: 0 % — SIGNIFICANT CHANGE UP (ref 0–6)
GLUCOSE SERPL-MCNC: 120 MG/DL — HIGH (ref 70–99)
HCT VFR BLD CALC: 40.4 % — SIGNIFICANT CHANGE UP (ref 39–50)
HGB BLD-MCNC: 12.9 G/DL — LOW (ref 13–17)
HIV 1+2 AB+HIV1 P24 AG SERPL QL IA: SIGNIFICANT CHANGE UP
IGA FLD-MCNC: 283 MG/DL — SIGNIFICANT CHANGE UP (ref 84–499)
IGG FLD-MCNC: 704 MG/DL — SIGNIFICANT CHANGE UP (ref 610–1660)
IGM SERPL-MCNC: 51 MG/DL — SIGNIFICANT CHANGE UP (ref 35–242)
IMM GRANULOCYTES NFR BLD AUTO: 0.7 % — SIGNIFICANT CHANGE UP (ref 0–0.9)
INR BLD: 1.29 RATIO — HIGH (ref 0.88–1.16)
KAPPA LC SER QL IFE: 2.19 MG/DL — HIGH (ref 0.33–1.94)
KAPPA/LAMBDA FREE LIGHT CHAIN RATIO, SERUM: 1.45 RATIO — SIGNIFICANT CHANGE UP (ref 0.26–1.65)
LAMBDA LC SER QL IFE: 1.51 MG/DL — SIGNIFICANT CHANGE UP (ref 0.57–2.63)
LEGIONELLA AG UR QL: NEGATIVE — SIGNIFICANT CHANGE UP
LYMPHOCYTES # BLD AUTO: 1.2 K/UL — SIGNIFICANT CHANGE UP (ref 1–3.3)
LYMPHOCYTES # BLD AUTO: 5.4 % — LOW (ref 13–44)
MAGNESIUM SERPL-MCNC: 1.9 MG/DL — SIGNIFICANT CHANGE UP (ref 1.6–2.6)
MCHC RBC-ENTMCNC: 30 PG — SIGNIFICANT CHANGE UP (ref 27–34)
MCHC RBC-ENTMCNC: 31.9 GM/DL — LOW (ref 32–36)
MCV RBC AUTO: 94 FL — SIGNIFICANT CHANGE UP (ref 80–100)
MONOCYTES # BLD AUTO: 1.32 K/UL — HIGH (ref 0–0.9)
MONOCYTES NFR BLD AUTO: 6 % — SIGNIFICANT CHANGE UP (ref 2–14)
MRSA PCR RESULT.: SIGNIFICANT CHANGE UP
NEUTROPHILS # BLD AUTO: 19.33 K/UL — HIGH (ref 1.8–7.4)
NEUTROPHILS NFR BLD AUTO: 87.8 % — HIGH (ref 43–77)
NRBC # BLD: 0 /100 WBCS — SIGNIFICANT CHANGE UP (ref 0–0)
PHOSPHATE SERPL-MCNC: 2.1 MG/DL — LOW (ref 2.5–4.5)
PLATELET # BLD AUTO: 240 K/UL — SIGNIFICANT CHANGE UP (ref 150–400)
POTASSIUM SERPL-MCNC: 4.4 MMOL/L — SIGNIFICANT CHANGE UP (ref 3.5–5.3)
POTASSIUM SERPL-SCNC: 4.4 MMOL/L — SIGNIFICANT CHANGE UP (ref 3.5–5.3)
PROTHROM AB SERPL-ACNC: 14.9 SEC — HIGH (ref 10.5–13.4)
RBC # BLD: 4.3 M/UL — SIGNIFICANT CHANGE UP (ref 4.2–5.8)
RBC # FLD: 14.4 % — SIGNIFICANT CHANGE UP (ref 10.3–14.5)
S AUREUS DNA NOSE QL NAA+PROBE: SIGNIFICANT CHANGE UP
SODIUM SERPL-SCNC: 140 MMOL/L — SIGNIFICANT CHANGE UP (ref 135–145)
WBC # BLD: 22.04 K/UL — HIGH (ref 3.8–10.5)
WBC # FLD AUTO: 22.04 K/UL — HIGH (ref 3.8–10.5)

## 2023-06-06 PROCEDURE — 99255 IP/OBS CONSLTJ NEW/EST HI 80: CPT | Mod: GC

## 2023-06-06 PROCEDURE — 99233 SBSQ HOSP IP/OBS HIGH 50: CPT

## 2023-06-06 RX ADMIN — Medication 3 MILLILITER(S): at 23:20

## 2023-06-06 RX ADMIN — AZITHROMYCIN 255 MILLIGRAM(S): 500 TABLET, FILM COATED ORAL at 13:32

## 2023-06-06 RX ADMIN — LIDOCAINE 1 PATCH: 4 CREAM TOPICAL at 23:20

## 2023-06-06 RX ADMIN — TIOTROPIUM BROMIDE 2 PUFF(S): 18 CAPSULE ORAL; RESPIRATORY (INHALATION) at 11:09

## 2023-06-06 RX ADMIN — Medication 3 MILLILITER(S): at 11:09

## 2023-06-06 RX ADMIN — Medication 3 MILLILITER(S): at 05:21

## 2023-06-06 RX ADMIN — LIDOCAINE 1 PATCH: 4 CREAM TOPICAL at 19:00

## 2023-06-06 RX ADMIN — Medication 3 MILLILITER(S): at 00:13

## 2023-06-06 RX ADMIN — LIDOCAINE 1 PATCH: 4 CREAM TOPICAL at 11:06

## 2023-06-06 RX ADMIN — CEFTRIAXONE 100 MILLIGRAM(S): 500 INJECTION, POWDER, FOR SOLUTION INTRAMUSCULAR; INTRAVENOUS at 17:18

## 2023-06-06 RX ADMIN — ENOXAPARIN SODIUM 130 MILLIGRAM(S): 100 INJECTION SUBCUTANEOUS at 18:23

## 2023-06-06 RX ADMIN — Medication 40 MILLIGRAM(S): at 05:22

## 2023-06-06 RX ADMIN — ENOXAPARIN SODIUM 130 MILLIGRAM(S): 100 INJECTION SUBCUTANEOUS at 05:42

## 2023-06-06 RX ADMIN — ATORVASTATIN CALCIUM 20 MILLIGRAM(S): 80 TABLET, FILM COATED ORAL at 21:21

## 2023-06-06 RX ADMIN — Medication 3 MILLILITER(S): at 17:18

## 2023-06-06 RX ADMIN — PANTOPRAZOLE SODIUM 40 MILLIGRAM(S): 20 TABLET, DELAYED RELEASE ORAL at 05:22

## 2023-06-06 RX ADMIN — BUDESONIDE AND FORMOTEROL FUMARATE DIHYDRATE 2 PUFF(S): 160; 4.5 AEROSOL RESPIRATORY (INHALATION) at 17:18

## 2023-06-06 RX ADMIN — BUDESONIDE AND FORMOTEROL FUMARATE DIHYDRATE 2 PUFF(S): 160; 4.5 AEROSOL RESPIRATORY (INHALATION) at 05:22

## 2023-06-06 NOTE — PROGRESS NOTE ADULT - SUBJECTIVE AND OBJECTIVE BOX
[facility] Division of Hospital Medicine  Teresa AmritDO jeronimo   Available via [employer] Teams      Patient is a 63y old  Male who presents with a chief complaint of hypoxia (2023 14:14)      SUBJECTIVE / OVERNIGHT EVENTS:  SOB improved, afebrile now.     MEDICATIONS  (STANDING):  albuterol/ipratropium for Nebulization 3 milliLiter(s) Nebulizer every 6 hours  atorvastatin 20 milliGRAM(s) Oral at bedtime  azithromycin  IVPB 500 milliGRAM(s) IV Intermittent every 24 hours  azithromycin  IVPB      budesonide 160 MICROgram(s)/formoterol 4.5 MICROgram(s) Inhaler 2 Puff(s) Inhalation two times a day  cefTRIAXone   IVPB 1000 milliGRAM(s) IV Intermittent every 24 hours  enoxaparin Injectable 130 milliGRAM(s) SubCutaneous every 12 hours  lidocaine   4% Patch 1 Patch Transdermal daily  pantoprazole    Tablet 40 milliGRAM(s) Oral before breakfast  predniSONE   Tablet 40 milliGRAM(s) Oral daily  tiotropium 2.5 MICROgram(s) Inhaler 2 Puff(s) Inhalation daily    MEDICATIONS  (PRN):  acetaminophen     Tablet .. 650 milliGRAM(s) Oral every 6 hours PRN Temp greater or equal to 38C (100.4F), Mild Pain (1 - 3)      CAPILLARY BLOOD GLUCOSE        I&O's Summary    2023 07:01  -  2023 15:04  --------------------------------------------------------  IN: 480 mL / OUT: 0 mL / NET: 480 mL        PHYSICAL EXAM:  Vital Signs Last 24 Hrs  T(C): 36.7 (2023 11:14), Max: 36.8 (2023 18:25)  T(F): 98.1 (2023 11:14), Max: 98.2 (2023 18:25)  HR: 95 (2023 12:11) (78 - 95)  BP: 148/82 (2023 12:10) (135/82 - 176/83)  BP(mean): 99 (2023 15:13) (99 - 99)  RR: 18 (2023 11:14) (17 - 18)  SpO2: 86% (2023 12:11) (86% - 93%)    Parameters below as of 2023 12:11  Patient On (Oxygen Delivery Method): room air      CONSTITUTIONAL: NAD, well-developed, well-groomed  EYES: conjunctiva and sclera clear  ENMT: Moist oral mucosa, no pharyngeal injection or exudates  RESPIRATORY: Normal respiratory effort; lungs are clear to auscultation bilaterally  CARDIOVASCULAR: Regular rate and rhythm, normal S1 and S2; No lower extremity edema  ABDOMEN: Nontender to palpation, normoactive bowel sounds, no rebound/guarding  MUSCULOSKELETAL: no clubbing or cyanosis of digits; no joint swelling or tenderness to palpation  PSYCH: A+O to person, place, and time; affect appropriate  NEUROLOGY: CN 2-12 are intact and symmetric; no gross sensory deficits   SKIN: No rashes; no palpable lesions    LABS:                        12.9   22.04 )-----------( 240      ( 2023 07:15 )             40.4     06-06    140  |  106  |  13  ----------------------------<  120<H>  4.4   |  21<L>  |  0.63    Ca    8.3<L>      2023 07:14  Phos  2.1     06-06  Mg     1.9     06-06    TPro  7.0  /  Alb  4.0  /  TBili  0.3  /  DBili  x   /  AST  20  /  ALT  26  /  AlkPhos  91  06-05    PT/INR - ( 2023 07:15 )   PT: 14.9 sec;   INR: 1.29 ratio         PTT - ( 2023 07:08 )  PTT:25.0 sec  CARDIAC MARKERS ( 2023 07:08 )  x     / x     / 86 U/L / x     / 1.8 ng/mL      Urinalysis Basic - ( 2023 21:57 )    Color: Light Yellow / Appearance: Clear / S.022 / pH: x  Gluc: x / Ketone: Negative  / Bili: Negative / Urobili: Negative   Blood: x / Protein: Negative / Nitrite: Negative   Leuk Esterase: Negative / RBC: x / WBC x   Sq Epi: x / Non Sq Epi: x / Bacteria: x        Culture - Blood (collected 2023 06:53)  Source: .Blood Blood-Peripheral  Preliminary Report (2023 09:02):    No growth to date.    Culture - Blood (collected 2023 06:45)  Source: .Blood Blood-Peripheral  Preliminary Report (2023 09:02):    No growth to date.        RADIOLOGY & ADDITIONAL TESTS:  Results Reviewed:   Imaging Personally Reviewed:  Electrocardiogram Personally Reviewed:    COORDINATION OF CARE:  Care Discussed with Consultants/Other Providers [Y/N]:  Prior or Outpatient Records Reviewed [Y/N]:

## 2023-06-06 NOTE — CONSULT NOTE ADULT - SUBJECTIVE AND OBJECTIVE BOX
PULMONARY CONSULT    HPI: 62 y/o M well known to our service (office pt) with PMH of prostate CA (recent dx, plans to start radiation), DVTs in the past was initially on Xarelto and is now currently on Coumadin (+APLS) - INR levels have been difficult to control, recent PNA about 1 month ago (s/p Ceftin) - with multiple episodes of PNA over the past year, recent dx PE. Presents with fever, SOB and hypoxia. CTA chest non diagnostic for PE, + R sided PNA. Denies CP, pleuritic CP.       PAST MEDICAL & SURGICAL HISTORY:  GERD (gastroesophageal reflux disease)  Bronchitis  Pneumonia  Basal cell carcinoma  Hyperlipidemia  DVT (deep venous thrombosis) "right lower extremity,after meniscus repair  2014 .  COPD (chronic obstructive pulmonary disease)  Prostate cancer  S/P laminectomy   S/P knee surgery  left meniscus -right meniscus  H/O total knee replacement, left 2016        Allergies  No Known Allergies        FAMILY HISTORY:  Family history of Alzheimer's disease (Mother)    Family history of heart attack (Father)  father at age 65      Social history: former smoker     Review of Systems:  CONSTITUTIONAL: No fever, chills, or fatigue  EYES: No eye pain, visual disturbances, or discharge  ENMT:  No difficulty hearing, tinnitus, vertigo; No sinus or throat pain  NECK: No pain or stiffness  RESPIRATORY: Per above  CARDIOVASCULAR: No chest pain, palpitations, dizziness, or leg swelling  GASTROINTESTINAL: No abdominal or epigastric pain. No nausea, vomiting, or hematemesis; No diarrhea or constipation. No melena or hematochezia.  GENITOURINARY: No dysuria, frequency, hematuria, or incontinence  NEUROLOGICAL: No headaches, memory loss, loss of strength, numbness, or tremors  SKIN: No itching, burning, rashes, or lesions   MUSCULOSKELETAL: No joint pain or swelling; No muscle, back, or extremity pain  PSYCHIATRIC: No depression, anxiety, mood swings, or difficulty sleeping      Medications:  MEDICATIONS  (STANDING):  albuterol/ipratropium for Nebulization 3 milliLiter(s) Nebulizer every 6 hours  atorvastatin 20 milliGRAM(s) Oral at bedtime  azithromycin  IVPB 500 milliGRAM(s) IV Intermittent every 24 hours  azithromycin  IVPB      budesonide 160 MICROgram(s)/formoterol 4.5 MICROgram(s) Inhaler 2 Puff(s) Inhalation two times a day  cefTRIAXone   IVPB 1000 milliGRAM(s) IV Intermittent every 24 hours  enoxaparin Injectable 130 milliGRAM(s) SubCutaneous every 12 hours  lidocaine   4% Patch 1 Patch Transdermal daily  pantoprazole    Tablet 40 milliGRAM(s) Oral before breakfast  predniSONE   Tablet 40 milliGRAM(s) Oral daily  tiotropium 2.5 MICROgram(s) Inhaler 2 Puff(s) Inhalation daily    MEDICATIONS  (PRN):  acetaminophen     Tablet .. 650 milliGRAM(s) Oral every 6 hours PRN Temp greater or equal to 38C (100.4F), Mild Pain (1 - 3)            Vital Signs Last 24 Hrs  T(C): 36.6 (2023 05:06), Max: 38.1 (2023 07:25)  T(F): 97.8 (2023 05:06), Max: 100.5 (2023 07:25)  HR: 78 (2023 05:06) (78 - 117)  BP: 144/80 (2023 05:06) (123/70 - 176/83)  BP(mean): 99 (2023 15:13) (94 - 99)  RR: 18 (2023 05:06) (16 - 20)  SpO2: 92% (2023 05:06) (92% - 96%)    Parameters below as of 2023 05:06  Patient On (Oxygen Delivery Method): nasal cannula  O2 Flow (L/min): 2        VBG pH 7.41 - @ 06:53  VBG pCO2 44 - @ 06:53  VBG O2 sat 86.6  @ 06:53  VBG lactate 1.6 -05 @ 06:53            LABS:                        13.2   15.76 )-----------( 251      ( 2023 07:08 )             41.5     -    141  |  104  |  17  ----------------------------<  143<H>  4.0   |  23  |  0.90    Ca    8.3<L>      2023 07:08    TPro  7.0  /  Alb  4.0  /  TBili  0.3  /  DBili  x   /  AST  20  /  ALT  26  /  AlkPhos  91  06-05      CARDIAC MARKERS ( 2023 07:08 )  x     / x     / 86 U/L / x     / 1.8 ng/mL      CAPILLARY BLOOD GLUCOSE        PT/INR - ( 2023 07:08 )   PT: 14.8 sec;   INR: 1.27 ratio         PTT - ( 2023 07:08 )  PTT:25.0 sec  Urinalysis Basic - ( 2023 21:57 )    Color: Light Yellow / Appearance: Clear / S.022 / pH: x  Gluc: x / Ketone: Negative  / Bili: Negative / Urobili: Negative   Blood: x / Protein: Negative / Nitrite: Negative   Leuk Esterase: Negative / RBC: x / WBC x   Sq Epi: x / Non Sq Epi: x / Bacteria: x      Procalcitonin, Serum: 0.07 ng/mL (23 @ 07:08)                Physical Examination:    General: No acute distress.      HEENT: Pupils equal, reactive to light.  Symmetric.    PULM: forced end exp wheeze, crackles R base    CVS: S1, S2    ABD: Soft, nondistended, nontender, normoactive bowel sounds, no masses    EXT: No edema, nontender    SKIN: Warm and well perfused, no rashes noted.    NEURO: Alert, oriented, interactive, nonfocal        RADIOLOGY REVIEWED    CTA chest: < from: CT Angio Chest PE Protocol w/ IV Cont (23 @ 12:35) >    FINDINGS:    LUNGS AND AIRWAYS: Patent central airways.  Mild emphysema. There are   patchy right upper and lower lobe groundglass opacities and branching   nodular opacities in the right lower lobe. Triangular 5 x 11 mm   Perifissural nodule nodule in the left lower lobe likely a lymph node.  PLEURA: No pleural effusion.  MEDIASTINUM AND PAUL: No lymphadenopathy.  VESSELS: Nondiagnostic study for evaluation of pulmonary embolism. There   is inadequate contrast bolus in the pulmonary arteries large contrast   bolus remains in the SVC. There is normal caliber.  HEART: Heart size is normal. No pericardial effusion.  CHEST WALL AND LOWER NECK: Within normal limits.  VISUALIZED UPPER ABDOMEN: Within normal limits.  BONES: Within normal limits.    IMPRESSION:  Nondiagnostic study for pulmonary embolism evaluation.    There are airspace opacities in the right upper and lower lobe concerning   for pneumonia.    < end of copied text >      LE duplex: < from: [facility] Duplex Lower Ext Vein Scan, Bilat (23 @ 16:10) >  IMPRESSION:    There has been considerable resolution in the DVT that had been present   on the earlier, 2023 examination.    Postphlebitic changes, consisting of wall thickening and synechiae now   affect the right popliteal vein, the posterior tibial peroneal trunk and   right gastrocnemius veins.    < end of copied text >     PULMONARY CONSULT    HPI: 62 y/o M well known to our service (office pt) with PMH of prostate CA (recent dx, plans to start radiation), DVTs in the past was initially on Xarelto and is now currently on Coumadin (+APLS) - INR levels have been difficult to control, recent PNA about 1 month ago (s/p Ceftin) - with multiple episodes of PNA over the past year, recent dx PE. Presents with fever, SOB and hypoxia. CTA chest non diagnostic for PE, + R sided PNA. Denies CP, pleuritic CP.     PAST MEDICAL & SURGICAL HISTORY:  GERD (gastroesophageal reflux disease)  Bronchitis  Pneumonia  Basal cell carcinoma  Hyperlipidemia  DVT (deep venous thrombosis) "right lower extremity,after meniscus repair  2014 .  COPD (chronic obstructive pulmonary disease)  Prostate cancer  S/P laminectomy   S/P knee surgery  left meniscus -right meniscus  H/O total knee replacement, left 2016    Allergies  No Known Allergies    FAMILY HISTORY:  Family history of Alzheimer's disease (Mother)    Family history of heart attack (Father)  father at age 65    Social history: former smoker     Review of Systems:  CONSTITUTIONAL: No fever, chills, or fatigue  EYES: No eye pain, visual disturbances, or discharge  ENMT:  No difficulty hearing, tinnitus, vertigo; No sinus or throat pain  NECK: No pain or stiffness  RESPIRATORY: Per above  CARDIOVASCULAR: No chest pain, palpitations, dizziness, or leg swelling  GASTROINTESTINAL: No abdominal or epigastric pain. No nausea, vomiting, or hematemesis; No diarrhea or constipation. No melena or hematochezia.  GENITOURINARY: No dysuria, frequency, hematuria, or incontinence  NEUROLOGICAL: No headaches, memory loss, loss of strength, numbness, or tremors  SKIN: No itching, burning, rashes, or lesions   MUSCULOSKELETAL: No joint pain or swelling; No muscle, back, or extremity pain  PSYCHIATRIC: No depression, anxiety, mood swings, or difficulty sleeping    Medications:  MEDICATIONS  (STANDING):  albuterol/ipratropium for Nebulization 3 milliLiter(s) Nebulizer every 6 hours  atorvastatin 20 milliGRAM(s) Oral at bedtime  azithromycin  IVPB 500 milliGRAM(s) IV Intermittent every 24 hours  azithromycin  IVPB      budesonide 160 MICROgram(s)/formoterol 4.5 MICROgram(s) Inhaler 2 Puff(s) Inhalation two times a day  cefTRIAXone   IVPB 1000 milliGRAM(s) IV Intermittent every 24 hours  enoxaparin Injectable 130 milliGRAM(s) SubCutaneous every 12 hours  lidocaine   4% Patch 1 Patch Transdermal daily  pantoprazole    Tablet 40 milliGRAM(s) Oral before breakfast  predniSONE   Tablet 40 milliGRAM(s) Oral daily  tiotropium 2.5 MICROgram(s) Inhaler 2 Puff(s) Inhalation daily    MEDICATIONS  (PRN):  acetaminophen     Tablet .. 650 milliGRAM(s) Oral every 6 hours PRN Temp greater or equal to 38C (100.4F), Mild Pain (1 - 3)    Vital Signs Last 24 Hrs  T(C): 36.6 (2023 05:06), Max: 38.1 (2023 07:25)  T(F): 97.8 (2023 05:06), Max: 100.5 (2023 07:25)  HR: 78 (2023 05:06) (78 - 117)  BP: 144/80 (2023 05:06) (123/70 - 176/83)  BP(mean): 99 (2023 15:13) (94 - 99)  RR: 18 (2023 05:06) (16 - 20)  SpO2: 92% (2023 05:06) (92% - 96%)    Parameters below as of 2023 05:06  Patient On (Oxygen Delivery Method): nasal cannula  O2 Flow (L/min): 2    VBG pH 7.41 - @ 06:53  VBG pCO2 44 - @ 06:53  VBG O2 sat 86.6  @ 06:53  VBG lactate 1.6 -05 @ 06:53    LABS:                        13.2   15.76 )-----------( 251      ( 2023 07:08 )             41.5     -    141  |  104  |  17  ----------------------------<  143<H>  4.0   |  23  |  0.90    Ca    8.3<L>      2023 07:08    TPro  7.0  /  Alb  4.0  /  TBili  0.3  /  DBili  x   /  AST  20  /  ALT  26  /  AlkPhos  91  06-05      CARDIAC MARKERS ( 2023 07:08 )  x     / x     / 86 U/L / x     / 1.8 ng/mL    CAPILLARY BLOOD GLUCOSE      PT/INR - ( 2023 07:08 )   PT: 14.8 sec;   INR: 1.27 ratio        PTT - ( 2023 07:08 )  PTT:25.0 sec  Urinalysis Basic - ( 2023 21:57 )    Color: Light Yellow / Appearance: Clear / S.022 / pH: x  Gluc: x / Ketone: Negative  / Bili: Negative / Urobili: Negative   Blood: x / Protein: Negative / Nitrite: Negative   Leuk Esterase: Negative / RBC: x / WBC x   Sq Epi: x / Non Sq Epi: x / Bacteria: x      Procalcitonin, Serum: 0.07 ng/mL (23 @ 07:08)    Physical Examination:    General: No acute distress.      HEENT: Pupils equal, reactive to light.  Symmetric.    PULM: forced end exp wheeze, crackles R base    CVS: S1, S2    ABD: Soft, nondistended, nontender, normoactive bowel sounds, no masses    EXT: No edema, nontender    SKIN: Warm and well perfused, no rashes noted.    NEURO: Alert, oriented, interactive, nonfocal      RADIOLOGY REVIEWED    CTA chest: < from: CT Angio Chest PE Protocol w/ IV Cont (23 @ 12:35) >    FINDINGS:    LUNGS AND AIRWAYS: Patent central airways.  Mild emphysema. There are   patchy right upper and lower lobe groundglass opacities and branching   nodular opacities in the right lower lobe. Triangular 5 x 11 mm   Perifissural nodule nodule in the left lower lobe likely a lymph node.  PLEURA: No pleural effusion.  MEDIASTINUM AND PAUL: No lymphadenopathy.  VESSELS: Nondiagnostic study for evaluation of pulmonary embolism. There   is inadequate contrast bolus in the pulmonary arteries large contrast   bolus remains in the SVC. There is normal caliber.  HEART: Heart size is normal. No pericardial effusion.  CHEST WALL AND LOWER NECK: Within normal limits.  VISUALIZED UPPER ABDOMEN: Within normal limits.  BONES: Within normal limits.    IMPRESSION:  Nondiagnostic study for pulmonary embolism evaluation.    There are airspace opacities in the right upper and lower lobe concerning   for pneumonia.    < end of copied text >      LE duplex: < from: [facility] Duplex Lower Ext Vein Scan, Bilat (23 @ 16:10) >  IMPRESSION:    There has been considerable resolution in the DVT that had been present   on the earlier, 2023 examination.    Postphlebitic changes, consisting of wall thickening and synechiae now   affect the right popliteal vein, the posterior tibial peroneal trunk and   right gastrocnemius veins.    < end of copied text >

## 2023-06-06 NOTE — CONSULT NOTE ADULT - ASSESSMENT
62 y/o M well known to our service (office pt) with PMH of prostate CA (recent dx, plans to start radiation), DVTs in the past was initially on Xarelto and is now currently on Coumadin (+APLS) - INR levels have been difficult to control, recent PNA about 1 month ago (s/p Ceftin) - with multiple episodes of PNA over the past year, recent dx PE. Presents with fever, SOB and hypoxia. CTA chest non diagnostic for PE, + R sided PNA.

## 2023-06-06 NOTE — CONSULT NOTE ADULT - NS ATTEND AMEND GEN_ALL_CORE FT
pt with recurrent fleeting pna with neg eos  suggest prednisone 40mg, continue abx  will arrange for bronchoscopy and biopsy  paddy pt, wife and md

## 2023-06-06 NOTE — CONSULT NOTE ADULT - PROBLEM SELECTOR RECOMMENDATION 9
CTA chest with R sided PNA  -Recent L sided PNA in May  -Multiple episodes of PNA over the past year. F/u immunoglobulin panel.   -Continue with Ceftriaxone, azithromycin  -F/u urine legionella  -F/u MRSA/MSSA nasal PCR  -Check sputum culture if able CTA chest with R sided PNA  -Recent L sided PNA in May  -Multiple episodes of PNA over the past year. F/u immunoglobulin panel.   -Continue with Ceftriaxone, azithromycin  -F/u urine legionella  -F/u MRSA/MSSA nasal PCR  -Check sputum culture if able  -Trend leukocytosis, fever curve. Uptrending this AM, likely 2nd to steroids. CTA chest with R sided PNA  -Recent L sided PNA in May  -Multiple episodes of PNA over the past year. F/u immunoglobulin panel. F/u protein electrophoresis   -Continue with Ceftriaxone, azithromycin  -F/u urine legionella  -F/u MRSA/MSSA nasal PCR  -Check sputum culture if able  -Trend leukocytosis, fever curve. Uptrending this AM, likely 2nd to steroids  -May need to consider bronchoscopy. CTA chest with R sided PNA  -Recent L sided PNA in May  -Multiple episodes of PNA over the past year. F/u immunoglobulin panel. F/u protein electrophoresis   -Continue with Ceftriaxone, azithromycin  -F/u urine legionella  -F/u MRSA/MSSA nasal PCR  -Check sputum culture if able  -Trend leukocytosis, fever curve. Uptrending this AM, likely 2nd to steroids  -May need to consider bronchoscopy.  -Reviewed CT chest with chief of chest radiology LUANN Starks, ? organizing PNA. Suggest continue steroids.

## 2023-06-06 NOTE — PHYSICAL THERAPY INITIAL EVALUATION ADULT - ADDITIONAL COMMENTS
Pt lives with wife in ranch style house, 3 stairs to enter. Was independent ambulator with cane, was having difficulty because of R knee pain from OA, knee replacement had been pushed off for medical issues. Pt had done pulmonary outpatient PT following pneumonia, had to stop because of his knee.

## 2023-06-06 NOTE — CONSULT NOTE ADULT - SUBJECTIVE AND OBJECTIVE BOX
****INCOMPLETE****    62 yo M with a PMH of COPD (on intermittent home O2 2L prn-uses about 1x monthly), DVT/PE (2014 and 2023) on Coumadin (was on Xarelto [id number], then restarted 2023 after DVT recurrence and transitioned to Coumadin 6 weeks prior to admission, recently supratherapeutic with doses held until day PTA), prostate cancer (planning for radiation), OA (s/p L knee replacement 2014), and multiple recurrent pneumonias in past 6 months who was in his usual state of health until about 11pm the night prior to admission when he noted a hard time breathing for which he used his home oxygen. He also had chills/sweats and a fever to 101 at 5am. He had taken 3 Tylenol, but his pulse ox was 80% on 4L O2 so he came to ER. Reports dry cough where he feels there is mucus he can't get out. Presentation is similar to prior pneumonias per patient.      Allergies    No Known Allergies    Intolerances        MEDICATIONS  (STANDING):  albuterol/ipratropium for Nebulization 3 milliLiter(s) Nebulizer every 6 hours  atorvastatin 20 milliGRAM(s) Oral at bedtime  azithromycin  IVPB 500 milliGRAM(s) IV Intermittent every 24 hours  azithromycin  IVPB      budesonide 160 MICROgram(s)/formoterol 4.5 MICROgram(s) Inhaler 2 Puff(s) Inhalation two times a day  cefTRIAXone   IVPB 1000 milliGRAM(s) IV Intermittent every 24 hours  enoxaparin Injectable 130 milliGRAM(s) SubCutaneous every 12 hours  lidocaine   4% Patch 1 Patch Transdermal daily  pantoprazole    Tablet 40 milliGRAM(s) Oral before breakfast  predniSONE   Tablet 40 milliGRAM(s) Oral daily  tiotropium 2.5 MICROgram(s) Inhaler 2 Puff(s) Inhalation daily    MEDICATIONS  (PRN):  acetaminophen     Tablet .. 650 milliGRAM(s) Oral every 6 hours PRN Temp greater or equal to 38C (100.4F), Mild Pain (1 - 3)      PAST MEDICAL & SURGICAL HISTORY:  GERD (gastroesophageal reflux disease)      Bronchitis  winter 2014      Pneumonia      Basal cell carcinoma  removed      Hyperlipidemia      DVT (deep venous thrombosis)  "right lower extremity,after meniscus repair  08/2014 & Had Hemato consult (Prothrombin gene mutation study was normal+LA),treated with Xarelto" & currently on xarelto.      COPD (chronic obstructive pulmonary disease)      Prostate cancer      S/P laminectomy  1990      S/P knee surgery  2013 left meniscus  2015-right meniscus      H/O total knee replacement, left  2016          FAMILY HISTORY:  Family history of Alzheimer's disease (Mother)    Family history of heart attack (Father)  father at age 65        SOCIAL HISTORY: No alcohol, tobacco, or drug use history    REVIEW OF SYSTEMS:  CONSTITUTIONAL: no fever  EYES/ENT: No visual changes; no throat pain  NECK: No pain or stiffness  RESPIRATORY: no SOB or cough  CARDIOVASCULAR: No chest pain or palpitations  GASTROINTESTINAL: No abdominal pain. No N/V/D/C  GENITOURINARY: No dysuria, change in frequency, or hematuria  NEUROLOGICAL: No numbness or focal weakness  SKIN: No itching, burning, rashes, or lesions  Psych: No depression  MSK: no joint pain  Allergy: no urticaria        T(F): 98.1 (06-06-23 @ 11:14), Max: 98.2 (06-05-23 @ 18:25)  HR: 95 (06-06-23 @ 12:11)  BP: 148/82 (06-06-23 @ 12:10)  RR: 18 (06-06-23 @ 11:14)  SpO2: 86% (06-06-23 @ 12:11)  Wt(kg): --    GENERAL: NAD  HEENT: EOMI, MMM, no oropharyngeal lesions or erythema appreciated  Pulm: no increased WOB, CTAB/L  CV: RRR, S1, S2, no m/g/r  ABDOMEN: soft, NT, ND, no masses felt, no HSM  MSK: nl ROM  EXTREMITIES: no appreciable edema in b/l LE  Neuro: A&Ox3, no focal deficits  SKIN: warm and dry, no visible rash                          12.9   22.04 )-----------( 240      ( 06 Jun 2023 07:15 )             40.4       06-06    140  |  106  |  13  ----------------------------<  120<H>  4.4   |  21<L>  |  0.63    Ca    8.3<L>      06 Jun 2023 07:14  Phos  2.1     06-06  Mg     1.9     06-06    TPro  7.0  /  Alb  4.0  /  TBili  0.3  /  DBili  x   /  AST  20  /  ALT  26  /  AlkPhos  91  06-05      Magnesium, Serum: 1.9 mg/dL (06-06 @ 07:14)  Phosphorus Level, Serum: 2.1 mg/dL (06-06 @ 07:14)       62 yo M with a PMH of COPD (on intermittent home O2 2L prn-uses about 1x monthly), DVT/PE (2014 and 2023) on Coumadin (was on Xarelto [id number], then restarted 2023 after DVT recurrence and transitioned to Coumadin 6 weeks prior to admission, recently supratherapeutic with doses held until day PTA), prostate cancer (planning for radiation), OA (s/p L knee replacement 2014), and multiple recurrent pneumonias in past 6 months who was in his usual state of health until about 11pm the night prior to admission when he noted a hard time breathing for which he used his home oxygen. He also had chills/sweats and a fever to 101 at 5am. He had taken 3 Tylenol, but his pulse ox was 80% on 4L O2 so he came to ER. Reports dry cough where he feels there is mucus he can't get out. Presentation is similar to prior pneumonias per patient.    In terms of his Hematological history, he had a provoked post-surgical DVT in 2014. He was placed on Xarelto and continued as he had repeatedly positive lupus anticoagulant testing (albeit while on Xarelto). He had been recommended to switch to Coumadin but declined. He was stopped off Xarelto in December 2022 after having a negative LA test while off Xarelto. However, in April 2023, he developed a new unprovoked DVT. LA testing later that day (after being given a dose of Xarelto) was positive. He also had a PE a couple weeks later while being bridged to Coumadin, which he has been on most recently (although held for the past few days due to being supratherapeutic).    Hematology consulted to confirm anticoagulation recommendations.      Allergies    No Known Allergies    Intolerances        MEDICATIONS  (STANDING):  albuterol/ipratropium for Nebulization 3 milliLiter(s) Nebulizer every 6 hours  atorvastatin 20 milliGRAM(s) Oral at bedtime  azithromycin  IVPB 500 milliGRAM(s) IV Intermittent every 24 hours  azithromycin  IVPB      budesonide 160 MICROgram(s)/formoterol 4.5 MICROgram(s) Inhaler 2 Puff(s) Inhalation two times a day  cefTRIAXone   IVPB 1000 milliGRAM(s) IV Intermittent every 24 hours  enoxaparin Injectable 130 milliGRAM(s) SubCutaneous every 12 hours  lidocaine   4% Patch 1 Patch Transdermal daily  pantoprazole    Tablet 40 milliGRAM(s) Oral before breakfast  predniSONE   Tablet 40 milliGRAM(s) Oral daily  tiotropium 2.5 MICROgram(s) Inhaler 2 Puff(s) Inhalation daily    MEDICATIONS  (PRN):  acetaminophen     Tablet .. 650 milliGRAM(s) Oral every 6 hours PRN Temp greater or equal to 38C (100.4F), Mild Pain (1 - 3)      PAST MEDICAL & SURGICAL HISTORY:  GERD (gastroesophageal reflux disease)      Bronchitis  winter 2014      Pneumonia      Basal cell carcinoma  removed      Hyperlipidemia      DVT (deep venous thrombosis)  "right lower extremity,after meniscus repair  08/2014 & Had Hemato consult (Prothrombin gene mutation study was normal+LA),treated with Xarelto" & currently on xarelto.      COPD (chronic obstructive pulmonary disease)      Prostate cancer      S/P laminectomy  1990      S/P knee surgery  2013 left meniscus  2015-right meniscus      H/O total knee replacement, left  2016          FAMILY HISTORY:  Family history of Alzheimer's disease (Mother)    Family history of heart attack (Father)  father at age 65        SOCIAL HISTORY: No alcohol, tobacco, or drug use    REVIEW OF SYSTEMS:  CONSTITUTIONAL: + fever  EYES/ENT: No visual changes; no throat pain  NECK: No pain or stiffness  RESPIRATORY: + SOB, cough  CARDIOVASCULAR: No chest pain or palpitations  GASTROINTESTINAL: No abdominal pain. No N/V/D/C  GENITOURINARY: No dysuria, change in frequency, or hematuria  NEUROLOGICAL: No numbness or focal weakness  SKIN: No itching, burning, rashes, or lesions  Psych: No depression  MSK: no joint pain  Allergy: no urticaria        T(F): 98.1 (06-06-23 @ 11:14), Max: 98.2 (06-05-23 @ 18:25)  HR: 95 (06-06-23 @ 12:11)  BP: 148/82 (06-06-23 @ 12:10)  RR: 18 (06-06-23 @ 11:14)  SpO2: 86% (06-06-23 @ 12:11)  Wt(kg): --    GENERAL: NAD  HEENT: EOMI, MMM, no oropharyngeal lesions or erythema appreciated  Pulm: no increased WOB, CTAB/L  CV: RRR, S1, S2, no m/g/r  ABDOMEN: soft, NT, ND, no masses felt, no HSM  MSK: nl ROM  EXTREMITIES: no appreciable edema in b/l LE  Neuro: A&Ox3, no focal deficits  SKIN: warm and dry, no visible rash                          12.9   22.04 )-----------( 240      ( 06 Jun 2023 07:15 )             40.4       06-06    140  |  106  |  13  ----------------------------<  120<H>  4.4   |  21<L>  |  0.63    Ca    8.3<L>      06 Jun 2023 07:14  Phos  2.1     06-06  Mg     1.9     06-06    TPro  7.0  /  Alb  4.0  /  TBili  0.3  /  DBili  x   /  AST  20  /  ALT  26  /  AlkPhos  91  06-05      Magnesium, Serum: 1.9 mg/dL (06-06 @ 07:14)  Phosphorus Level, Serum: 2.1 mg/dL (06-06 @ 07:14)

## 2023-06-06 NOTE — PROGRESS NOTE ADULT - PROBLEM SELECTOR PLAN 1
febrile, tachy, wbc on admission.  Not severe as no end organ damage and lactate 1.6  received abx and 500cc ns.  treat pna as below  Nasal canula 2L to maintain saturation>88%    unlikely cardiac as BNP <36, trop 7 and no overload on CXR/CT

## 2023-06-06 NOTE — CONSULT NOTE ADULT - ASSESSMENT
62 yo M with a PMH of COPD (on intermittent home O2 2L prn-uses about 1x monthly), DVT/PE (2014 and 2023) on Coumadin (was on Xarelto 3533-4760, then restarted 2023 after DVT recurrence and transitioned to Coumadin 6 weeks prior to admission, recently supratherapeutic with doses held until day PTA), prostate cancer (planning for radiation), OA (s/p L knee replacement 2014), and multiple recurrent pneumonias in past 6 months p/w SOB and fevers, a/w hypoxic respiratory failure, thought to be due to pneumonia. Hematology consulted to assist with A/C.      #History of Recurrent DVT/PE  - Hematologist is Dr. Reid at [facility]  - Provoked post-surgical DVT in 2014, repeatedly LA (DRVVT) positive while on Xarelto, continued A/C 2014-12/2022  - Stopped A/C after LA negative while off Xarelto  - Restarted Xarelto again 04/2023 after new unprovoked DVT. LA testing positive (after having been given a dose of Xarelto) and pt bridged to Coumadin c/b PE 05/2023 during bridging  - Still cannot r/o LA being false positive due to being on DOAC while getting tested. However, with two DVTs including one unprovoked, patient will need to be on lifelong A/C  - B2GP and ACL repeatedly negative. Given that patient may be only single positive for APLS, could consider switching back to Xarelto vs remaining on Coumadin. Will discuss with Dr. Doran  - Currently remain on therapeutic Lovenox inpatient BID until decision made      Aryles Hedjar, MD, PGY-5  Hematology/Oncology Fellow  [facility]  Pager: [phone]  After 5PM and on weekends and holidays, please call the inpatient fellow on call.

## 2023-06-06 NOTE — PHYSICAL THERAPY INITIAL EVALUATION ADULT - PERTINENT HX OF CURRENT PROBLEM, REHAB EVAL
63 y/oM admitted 6/5 with difficulty breathing, chills/sweat and fever. He had taken 3 tylenol, but pulse ox was 80% on 4L O2, so came to ED. Reports dry cough  where he feels there is mucus he can't get out.   Presentation is similar to prior pneumonias per patient. CT chest with R sided pna. PMH COPD (on intermittent home O2 2L prn-uses about 1x monthly), DVT/PE on coumadin (transitioned from xarelto 6 weeks ago, recently SUPRATHERAPEUTIC with doses held until day PTA), prostate cancer planning for radiation, Right knee OA, s/p L knee replacement 2014, multiple recurrent pneumonias in past 6 months

## 2023-06-06 NOTE — CONSULT NOTE ADULT - PROBLEM SELECTOR RECOMMENDATION 4
LE duplex 6/5 with considerable resolution in the DVT that was present on 4/6. Now with postphlebitic changes.  -+APLS labs as an outpatient   -AC with Lovenox to Coumadin bridge

## 2023-06-06 NOTE — CONSULT NOTE ADULT - PROBLEM SELECTOR RECOMMENDATION 2
2nd to PNA  -S/p solumedrol 125mg IVP x1 in ED  -Solumedrol 20mg IVP q8h for now, will re-evaluate further taper  -Symbicort 160mcg/4.5mcg 2 puffs BID (home med: Trelegy)  -Spiriva 2 puffs qd   -Duoneb q6h  -Wean o2 as tolerated, keep sats >90% (on home o2 2-3LNC PRN only) 2nd to PNA  -S/p Solumedrol 125 mg IVP x1 in ED  -Continue Solumedrol 20mg IVP q8h for now, will re-evaluate further taper   -Symbicort 160mcg/4.5mcg 2 puffs BID (home med: Trelegy)  -Spiriva 2 puffs qd   -Duoneb q6h  -Wean O2 as tolerated, keep sats >90% (on home o2 2-3LNC PRN only) 2nd to PNA  -S/p Solumedrol 125 mg IVP x1 in ED  -S/p Solumedrol 20mg IVP q8h x1 day  -D/c IV steroids. Start Prednisone 10 mg PO qd  -Symbicort 160mcg/4.5mcg 2 puffs BID (home med: Trelegy)  -Spiriva 2 puffs qd   -Duoneb q6h  -Wean O2 as tolerated, keep sats >90% (on home o2 2-3LNC PRN only) 2nd to PNA  -S/p Solumedrol 125 mg IVP x1 in ED  -Continue Prednisone 40 mg PO qd for now, will re-evaluate further taper.   -Symbicort 160mcg/4.5mcg 2 puffs BID (home med: Trelegy)  -Spiriva 2 puffs qd   -Duoneb q6h  -Wean O2 as tolerated, keep sats >90% (on home o2 2-3LNC PRN only)

## 2023-06-07 LAB
ANION GAP SERPL CALC-SCNC: 11 MMOL/L — SIGNIFICANT CHANGE UP (ref 5–17)
AUTO DIFF PNL BLD: NEGATIVE — SIGNIFICANT CHANGE UP
BUN SERPL-MCNC: 14 MG/DL — SIGNIFICANT CHANGE UP (ref 7–23)
C-ANCA SER-ACNC: NEGATIVE — SIGNIFICANT CHANGE UP
CALCIUM SERPL-MCNC: 8.5 MG/DL — SIGNIFICANT CHANGE UP (ref 8.4–10.5)
CHLORIDE SERPL-SCNC: 106 MMOL/L — SIGNIFICANT CHANGE UP (ref 96–108)
CO2 SERPL-SCNC: 25 MMOL/L — SIGNIFICANT CHANGE UP (ref 22–31)
CREAT SERPL-MCNC: 0.74 MG/DL — SIGNIFICANT CHANGE UP (ref 0.5–1.3)
EGFR: 101 ML/MIN/1.73M2 — SIGNIFICANT CHANGE UP
GLUCOSE SERPL-MCNC: 112 MG/DL — HIGH (ref 70–99)
HCT VFR BLD CALC: 38.7 % — LOW (ref 39–50)
HGB BLD-MCNC: 12.3 G/DL — LOW (ref 13–17)
MCHC RBC-ENTMCNC: 29.7 PG — SIGNIFICANT CHANGE UP (ref 27–34)
MCHC RBC-ENTMCNC: 31.8 GM/DL — LOW (ref 32–36)
MCV RBC AUTO: 93.5 FL — SIGNIFICANT CHANGE UP (ref 80–100)
NRBC # BLD: 0 /100 WBCS — SIGNIFICANT CHANGE UP (ref 0–0)
P-ANCA SER-ACNC: NEGATIVE — SIGNIFICANT CHANGE UP
PLATELET # BLD AUTO: 259 K/UL — SIGNIFICANT CHANGE UP (ref 150–400)
POTASSIUM SERPL-MCNC: 3.6 MMOL/L — SIGNIFICANT CHANGE UP (ref 3.5–5.3)
POTASSIUM SERPL-SCNC: 3.6 MMOL/L — SIGNIFICANT CHANGE UP (ref 3.5–5.3)
PROT SERPL-MCNC: 5.9 G/DL — LOW (ref 6–8.3)
PROT SERPL-MCNC: 5.9 G/DL — LOW (ref 6–8.3)
RBC # BLD: 4.14 M/UL — LOW (ref 4.2–5.8)
RBC # FLD: 14.5 % — SIGNIFICANT CHANGE UP (ref 10.3–14.5)
SODIUM SERPL-SCNC: 142 MMOL/L — SIGNIFICANT CHANGE UP (ref 135–145)
WBC # BLD: 18.78 K/UL — HIGH (ref 3.8–10.5)
WBC # FLD AUTO: 18.78 K/UL — HIGH (ref 3.8–10.5)

## 2023-06-07 PROCEDURE — 99233 SBSQ HOSP IP/OBS HIGH 50: CPT

## 2023-06-07 RX ORDER — ENOXAPARIN SODIUM 100 MG/ML
130 INJECTION SUBCUTANEOUS EVERY 12 HOURS
Refills: 0 | Status: DISCONTINUED | OUTPATIENT
Start: 2023-06-07 | End: 2023-06-07

## 2023-06-07 RX ADMIN — Medication 3 MILLILITER(S): at 23:48

## 2023-06-07 RX ADMIN — AZITHROMYCIN 255 MILLIGRAM(S): 500 TABLET, FILM COATED ORAL at 13:11

## 2023-06-07 RX ADMIN — TIOTROPIUM BROMIDE 2 PUFF(S): 18 CAPSULE ORAL; RESPIRATORY (INHALATION) at 11:56

## 2023-06-07 RX ADMIN — Medication 40 MILLIGRAM(S): at 05:32

## 2023-06-07 RX ADMIN — Medication 3 MILLILITER(S): at 11:56

## 2023-06-07 RX ADMIN — Medication 3 MILLILITER(S): at 05:32

## 2023-06-07 RX ADMIN — ATORVASTATIN CALCIUM 20 MILLIGRAM(S): 80 TABLET, FILM COATED ORAL at 21:24

## 2023-06-07 RX ADMIN — PANTOPRAZOLE SODIUM 40 MILLIGRAM(S): 20 TABLET, DELAYED RELEASE ORAL at 05:56

## 2023-06-07 RX ADMIN — Medication 3 MILLILITER(S): at 17:27

## 2023-06-07 RX ADMIN — BUDESONIDE AND FORMOTEROL FUMARATE DIHYDRATE 2 PUFF(S): 160; 4.5 AEROSOL RESPIRATORY (INHALATION) at 05:33

## 2023-06-07 RX ADMIN — BUDESONIDE AND FORMOTEROL FUMARATE DIHYDRATE 2 PUFF(S): 160; 4.5 AEROSOL RESPIRATORY (INHALATION) at 17:25

## 2023-06-07 RX ADMIN — CEFTRIAXONE 100 MILLIGRAM(S): 500 INJECTION, POWDER, FOR SOLUTION INTRAMUSCULAR; INTRAVENOUS at 17:26

## 2023-06-07 RX ADMIN — ENOXAPARIN SODIUM 130 MILLIGRAM(S): 100 INJECTION SUBCUTANEOUS at 05:33

## 2023-06-07 RX ADMIN — LIDOCAINE 1 PATCH: 4 CREAM TOPICAL at 21:24

## 2023-06-07 NOTE — PROGRESS NOTE ADULT - PROBLEM SELECTOR PLAN 1
CTA chest with R sided PNA  -Recent L sided PNA in May  -Multiple episodes of PNA over the past year. F/u immunoglobulin panel. F/u protein electrophoresis   -Continue with Ceftriaxone, azithromycin  -Urine legionella negative  -MRSA/MSSA nasal PCR negative   -Check sputum culture if able  -Trend leukocytosis, fever curve. Uptrending, likely 2nd to steroids  -Reviewed CT chest with chief of chest radiology LUANN Starks, ? organizing PNA. Suggest continue steroids.  -Plan for bronch with biopsy tomorrow at 8AM with Dr. Issa (interventional pulm). NPO after midnight. Lovenox d/c'd for now. Please hold all AC. T&S ordered, coags ordered for AM.

## 2023-06-07 NOTE — PROGRESS NOTE ADULT - SUBJECTIVE AND OBJECTIVE BOX
Follow-up Pulm Progress Note    feeling better today  denies SOB, CP  sats 92% RA    Medications:  MEDICATIONS  (STANDING):  albuterol/ipratropium for Nebulization 3 milliLiter(s) Nebulizer every 6 hours  atorvastatin 20 milliGRAM(s) Oral at bedtime  azithromycin  IVPB 500 milliGRAM(s) IV Intermittent every 24 hours  azithromycin  IVPB      budesonide 160 MICROgram(s)/formoterol 4.5 MICROgram(s) Inhaler 2 Puff(s) Inhalation two times a day  cefTRIAXone   IVPB 1000 milliGRAM(s) IV Intermittent every 24 hours  lidocaine   4% Patch 1 Patch Transdermal daily  pantoprazole    Tablet 40 milliGRAM(s) Oral before breakfast  predniSONE   Tablet 40 milliGRAM(s) Oral daily  tiotropium 2.5 MICROgram(s) Inhaler 2 Puff(s) Inhalation daily    MEDICATIONS  (PRN):  acetaminophen     Tablet .. 650 milliGRAM(s) Oral every 6 hours PRN Temp greater or equal to 38C (100.4F), Mild Pain (1 - 3)          Vital Signs Last 24 Hrs  T(C): 36.5 (2023 04:55), Max: 36.7 (2023 11:14)  T(F): 97.7 (2023 04:55), Max: 98.1 (2023 11:14)  HR: 60 (2023 04:55) (60 - 95)  BP: 132/84 (2023 04:55) (132/84 - 157/95)  BP(mean): --  RR: 18 (2023 04:55) (18 - 18)  SpO2: 96% (2023 04:55) (86% - 96%)    Parameters below as of 2023 04:55  Patient On (Oxygen Delivery Method): nasal cannula  O2 Flow (L/min): 2            -06 @ 07:01  -  06-07 @ 07:00  --------------------------------------------------------  IN: 840 mL / OUT: 0 mL / NET: 840 mL          LABS:                        12.3   18.78 )-----------( 259      ( 2023 07:07 )             38.7     -07    142  |  106  |  14  ----------------------------<  112<H>  3.6   |  25  |  0.74    Ca    8.5      2023 07:06  Phos  2.1     06-  Mg     1.9     -06    TPro  5.9<L>  /  Alb  x   /  TBili  x   /  DBili  x   /  AST  x   /  ALT  x   /  AlkPhos  x                 PT/INR - ( 2023 07:15 )   PT: 14.9 sec;   INR: 1.29 ratio           Urinalysis Basic - ( 2023 21:57 )    Color: Light Yellow / Appearance: Clear / S.022 / pH: x  Gluc: x / Ketone: Negative  / Bili: Negative / Urobili: Negative   Blood: x / Protein: Negative / Nitrite: Negative   Leuk Esterase: Negative / RBC: x / WBC x   Sq Epi: x / Non Sq Epi: x / Bacteria: x      Procalcitonin, Serum: 0.07 ng/mL (23 @ 07:08)          CULTURES:     Culture - Blood (collected 23 @ 06:53)  Source: .Blood Blood-Peripheral  Preliminary Report (23 @ 09:02):    No growth to date.    Culture - Blood (collected 23 @ 06:45)  Source: .Blood Blood-Peripheral  Preliminary Report (23 @ 09:02):    No growth to date.                Physical Examination:  PULM: minimal forced end exp wheezes   CVS: S1, S2 heard    RADIOLOGY REVIEWED  CT chest:    < from: CT Angio Chest PE Protocol w/ IV Cont (23 @ 12:35) >  FINDINGS:    LUNGS AND AIRWAYS: Patent central airways.  Mild emphysema. There are   patchy right upper and lower lobe groundglass opacities and branching   nodular opacities in the right lower lobe. Triangular 5 x 11 mm   Perifissural nodule nodule in the left lower lobe likely a lymph node.  PLEURA: No pleural effusion.  MEDIASTINUM AND PAUL: No lymphadenopathy.  VESSELS: Nondiagnostic study for evaluation of pulmonary embolism. There   is inadequate contrast bolus in the pulmonary arteries large contrast   bolus remains in the SVC. There is normal caliber.  HEART: Heart size is normal. No pericardial effusion.  CHEST WALL AND LOWER NECK: Within normal limits.  VISUALIZED UPPER ABDOMEN: Within normal limits.  BONES: Within normal limits.    IMPRESSION:  Nondiagnostic study for pulmonary embolism evaluation.    There are airspace opacities in the right upper and lower lobe concerning   for pneumonia.      < end of copied text >

## 2023-06-07 NOTE — CHART NOTE - NSCHARTNOTEFT_GEN_A_CORE
Discussed case with patient's Hematologist Dr. Reid as noted in yesterday's note. Patient can switch to Xarelto prior to discharge (remain on Lovenox until no procedures planned - currently held for bronchoscopy tomorrow).  Discussed with primary team attending.    Aryles Hedjar, MD, PGY-5  Hematology/Oncology Fellow  [facility]  Pager: [phone]  After 5PM and on weekends and holidays, please call the inpatient fellow on call.

## 2023-06-07 NOTE — PROGRESS NOTE ADULT - SUBJECTIVE AND OBJECTIVE BOX
[facility] Division of Hospital Medicine  Teresanisreen Marcus DO   Available via Microsoft Teams      Patient is a 63y old  Male who presents with a chief complaint of hypoxia (2023 10:27)      SUBJECTIVE / OVERNIGHT EVENTS:  Feels well, no SOB, CP, fever, chills     MEDICATIONS  (STANDING):  albuterol/ipratropium for Nebulization 3 milliLiter(s) Nebulizer every 6 hours  atorvastatin 20 milliGRAM(s) Oral at bedtime  azithromycin  IVPB 500 milliGRAM(s) IV Intermittent every 24 hours  azithromycin  IVPB      budesonide 160 MICROgram(s)/formoterol 4.5 MICROgram(s) Inhaler 2 Puff(s) Inhalation two times a day  cefTRIAXone   IVPB 1000 milliGRAM(s) IV Intermittent every 24 hours  lidocaine   4% Patch 1 Patch Transdermal daily  pantoprazole    Tablet 40 milliGRAM(s) Oral before breakfast  predniSONE   Tablet 40 milliGRAM(s) Oral daily  tiotropium 2.5 MICROgram(s) Inhaler 2 Puff(s) Inhalation daily    MEDICATIONS  (PRN):  acetaminophen     Tablet .. 650 milliGRAM(s) Oral every 6 hours PRN Temp greater or equal to 38C (100.4F), Mild Pain (1 - 3)      CAPILLARY BLOOD GLUCOSE        I&O's Summary    2023 07:01  -  2023 07:00  --------------------------------------------------------  IN: 840 mL / OUT: 0 mL / NET: 840 mL        PHYSICAL EXAM:  Vital Signs Last 24 Hrs  T(C): 36.5 (2023 04:55), Max: 36.6 (2023 20:47)  T(F): 97.7 (2023 04:55), Max: 97.9 (2023 20:47)  HR: 60 (2023 04:55) (60 - 95)  BP: 132/84 (2023 04:55) (132/84 - 148/82)  BP(mean): --  RR: 18 (2023 04:55) (18 - 18)  SpO2: 96% (2023 04:55) (86% - 96%)    Parameters below as of 2023 04:55  Patient On (Oxygen Delivery Method): nasal cannula  O2 Flow (L/min): 2    CONSTITUTIONAL: NAD, well-developed, well-groomed, sitting in the chair   EYES: conjunctiva and sclera clear  ENMT: Moist oral mucosa, no pharyngeal injection or exudates  RESPIRATORY: Normal respiratory effort; lungs are clear to auscultation bilaterally  CARDIOVASCULAR: Regular rate and rhythm, normal S1 and S2; No lower extremity edema  ABDOMEN: Nontender to palpation, normoactive bowel sounds, no rebound/guarding  MUSCULOSKELETAL: no clubbing or cyanosis of digits; no joint swelling or tenderness to palpation  PSYCH: A+O to person, place, and time; affect appropriate  NEUROLOGY: CN 2-12 are intact and symmetric; no gross sensory deficits   SKIN: No rashes; no palpable lesions    LABS:                        12.3   18.78 )-----------( 259      ( 2023 07:07 )             38.7     06-07    142  |  106  |  14  ----------------------------<  112<H>  3.6   |  25  |  0.74    Ca    8.5      2023 07:06  Phos  2.1     06-06  Mg     1.9     06-06    TPro  5.9<L>  /  Alb  x   /  TBili  x   /  DBili  x   /  AST  x   /  ALT  x   /  AlkPhos  x   06-07    PT/INR - ( 2023 07:15 )   PT: 14.9 sec;   INR: 1.29 ratio               Urinalysis Basic - ( 2023 21:57 )    Color: Light Yellow / Appearance: Clear / S.022 / pH: x  Gluc: x / Ketone: Negative  / Bili: Negative / Urobili: Negative   Blood: x / Protein: Negative / Nitrite: Negative   Leuk Esterase: Negative / RBC: x / WBC x   Sq Epi: x / Non Sq Epi: x / Bacteria: x        Culture - Blood (collected 2023 06:53)  Source: .Blood Blood-Peripheral  Preliminary Report (2023 09:02):    No growth to date.    Culture - Blood (collected 2023 06:45)  Source: .Blood Blood-Peripheral  Preliminary Report (2023 09:02):    No growth to date.        RADIOLOGY & ADDITIONAL TESTS:  Results Reviewed:   Imaging Personally Reviewed:  Electrocardiogram Personally Reviewed:    COORDINATION OF CARE:  Care Discussed with Consultants/Other Providers [Y/N]:  Prior or Outpatient Records Reviewed [Y/N]:

## 2023-06-08 ENCOUNTER — RESULT REVIEW (OUTPATIENT)
Age: 63
End: 2023-06-08

## 2023-06-08 ENCOUNTER — TRANSCRIPTION ENCOUNTER (OUTPATIENT)
Age: 63
End: 2023-06-08

## 2023-06-08 LAB
ANION GAP SERPL CALC-SCNC: 12 MMOL/L — SIGNIFICANT CHANGE UP (ref 5–17)
APTT BLD: 26 SEC — LOW (ref 27.5–35.5)
B PERT IGG+IGM PNL SER: ABNORMAL
BUN SERPL-MCNC: 13 MG/DL — SIGNIFICANT CHANGE UP (ref 7–23)
CALCIUM SERPL-MCNC: 8.7 MG/DL — SIGNIFICANT CHANGE UP (ref 8.4–10.5)
CHLORIDE SERPL-SCNC: 105 MMOL/L — SIGNIFICANT CHANGE UP (ref 96–108)
CO2 SERPL-SCNC: 26 MMOL/L — SIGNIFICANT CHANGE UP (ref 22–31)
COLOR FLD: SIGNIFICANT CHANGE UP
CREAT SERPL-MCNC: 0.72 MG/DL — SIGNIFICANT CHANGE UP (ref 0.5–1.3)
EGFR: 102 ML/MIN/1.73M2 — SIGNIFICANT CHANGE UP
FLUID INTAKE SUBSTANCE CLASS: SIGNIFICANT CHANGE UP
GLUCOSE SERPL-MCNC: 84 MG/DL — SIGNIFICANT CHANGE UP (ref 70–99)
GRAM STN FLD: SIGNIFICANT CHANGE UP
GRAM STN FLD: SIGNIFICANT CHANGE UP
HCT VFR BLD CALC: 41.7 % — SIGNIFICANT CHANGE UP (ref 39–50)
HGB BLD-MCNC: 13.3 G/DL — SIGNIFICANT CHANGE UP (ref 13–17)
INR BLD: 1.2 RATIO — HIGH (ref 0.88–1.16)
LYMPHOCYTES # FLD: 3 % — SIGNIFICANT CHANGE UP
MCHC RBC-ENTMCNC: 29.7 PG — SIGNIFICANT CHANGE UP (ref 27–34)
MCHC RBC-ENTMCNC: 31.9 GM/DL — LOW (ref 32–36)
MCV RBC AUTO: 93.1 FL — SIGNIFICANT CHANGE UP (ref 80–100)
MESOTHL CELL # FLD: 2 % — SIGNIFICANT CHANGE UP
MONOS+MACROS # FLD: 9 % — SIGNIFICANT CHANGE UP
NEUTROPHILS-BODY FLUID: 86 % — SIGNIFICANT CHANGE UP
NIGHT BLUE STAIN TISS: SIGNIFICANT CHANGE UP
NRBC # BLD: 0 /100 WBCS — SIGNIFICANT CHANGE UP (ref 0–0)
PLATELET # BLD AUTO: 274 K/UL — SIGNIFICANT CHANGE UP (ref 150–400)
POTASSIUM SERPL-MCNC: 3.9 MMOL/L — SIGNIFICANT CHANGE UP (ref 3.5–5.3)
POTASSIUM SERPL-SCNC: 3.9 MMOL/L — SIGNIFICANT CHANGE UP (ref 3.5–5.3)
PROTHROM AB SERPL-ACNC: 13.8 SEC — HIGH (ref 10.5–13.4)
RBC # BLD: 4.48 M/UL — SIGNIFICANT CHANGE UP (ref 4.2–5.8)
RBC # FLD: 14.5 % — SIGNIFICANT CHANGE UP (ref 10.3–14.5)
RCV VOL RI: 5000 /UL — HIGH (ref 0–0)
SODIUM SERPL-SCNC: 143 MMOL/L — SIGNIFICANT CHANGE UP (ref 135–145)
SPECIMEN SOURCE: SIGNIFICANT CHANGE UP
TOTAL NUCLEATED CELL COUNT, BODY FLUID: 2826 /UL — SIGNIFICANT CHANGE UP
TUBE TYPE: SIGNIFICANT CHANGE UP
WBC # BLD: 12.91 K/UL — HIGH (ref 3.8–10.5)
WBC # FLD AUTO: 12.91 K/UL — HIGH (ref 3.8–10.5)

## 2023-06-08 PROCEDURE — 88305 TISSUE EXAM BY PATHOLOGIST: CPT | Mod: 26

## 2023-06-08 PROCEDURE — 99223 1ST HOSP IP/OBS HIGH 75: CPT | Mod: GC,25

## 2023-06-08 PROCEDURE — 71045 X-RAY EXAM CHEST 1 VIEW: CPT | Mod: 26

## 2023-06-08 PROCEDURE — 88312 SPECIAL STAINS GROUP 1: CPT | Mod: 26

## 2023-06-08 PROCEDURE — 88112 CYTOPATH CELL ENHANCE TECH: CPT | Mod: 26

## 2023-06-08 PROCEDURE — 99233 SBSQ HOSP IP/OBS HIGH 50: CPT

## 2023-06-08 PROCEDURE — 31624 DX BRONCHOSCOPE/LAVAGE: CPT | Mod: GC

## 2023-06-08 PROCEDURE — 31628 BRONCHOSCOPY/LUNG BX EACH: CPT | Mod: GC

## 2023-06-08 DEVICE — PACK THORACENTESIS CHG
Type: IMPLANTABLE DEVICE | Status: NON-FUNCTIONAL
Removed: 2023-06-08

## 2023-06-08 RX ORDER — RIVAROXABAN 15 MG-20MG
20 KIT ORAL
Refills: 0 | Status: DISCONTINUED | OUTPATIENT
Start: 2023-06-09 | End: 2023-06-09

## 2023-06-08 RX ORDER — IPRATROPIUM/ALBUTEROL SULFATE 18-103MCG
3 AEROSOL WITH ADAPTER (GRAM) INHALATION EVERY 6 HOURS
Refills: 0 | Status: DISCONTINUED | OUTPATIENT
Start: 2023-06-08 | End: 2023-06-09

## 2023-06-08 RX ADMIN — TIOTROPIUM BROMIDE 2 PUFF(S): 18 CAPSULE ORAL; RESPIRATORY (INHALATION) at 11:51

## 2023-06-08 RX ADMIN — ATORVASTATIN CALCIUM 20 MILLIGRAM(S): 80 TABLET, FILM COATED ORAL at 21:04

## 2023-06-08 RX ADMIN — CEFTRIAXONE 100 MILLIGRAM(S): 500 INJECTION, POWDER, FOR SOLUTION INTRAMUSCULAR; INTRAVENOUS at 17:04

## 2023-06-08 RX ADMIN — Medication 650 MILLIGRAM(S): at 15:50

## 2023-06-08 RX ADMIN — BUDESONIDE AND FORMOTEROL FUMARATE DIHYDRATE 2 PUFF(S): 160; 4.5 AEROSOL RESPIRATORY (INHALATION) at 06:35

## 2023-06-08 RX ADMIN — PANTOPRAZOLE SODIUM 40 MILLIGRAM(S): 20 TABLET, DELAYED RELEASE ORAL at 06:34

## 2023-06-08 RX ADMIN — BUDESONIDE AND FORMOTEROL FUMARATE DIHYDRATE 2 PUFF(S): 160; 4.5 AEROSOL RESPIRATORY (INHALATION) at 17:04

## 2023-06-08 RX ADMIN — Medication 650 MILLIGRAM(S): at 15:03

## 2023-06-08 RX ADMIN — LIDOCAINE 1 PATCH: 4 CREAM TOPICAL at 11:47

## 2023-06-08 RX ADMIN — LIDOCAINE 1 PATCH: 4 CREAM TOPICAL at 21:04

## 2023-06-08 RX ADMIN — LIDOCAINE 1 PATCH: 4 CREAM TOPICAL at 07:19

## 2023-06-08 RX ADMIN — AZITHROMYCIN 255 MILLIGRAM(S): 500 TABLET, FILM COATED ORAL at 12:52

## 2023-06-08 RX ADMIN — Medication 3 MILLILITER(S): at 11:51

## 2023-06-08 RX ADMIN — Medication 40 MILLIGRAM(S): at 06:34

## 2023-06-08 RX ADMIN — Medication 3 MILLILITER(S): at 06:35

## 2023-06-08 NOTE — PROGRESS NOTE ADULT - SUBJECTIVE AND OBJECTIVE BOX
[facility] Division of Hospital Medicine  Teresanisreen Marcus DO   Available via Microsoft Teams      Patient is a 63y old  Male who presents with a chief complaint of hypoxia (08 Jun 2023 09:10)      SUBJECTIVE / OVERNIGHT EVENTS:  ADDITIONAL REVIEW OF SYSTEMS:    MEDICATIONS  (STANDING):  albuterol/ipratropium for Nebulization 3 milliLiter(s) Nebulizer every 6 hours  atorvastatin 20 milliGRAM(s) Oral at bedtime  azithromycin  IVPB 500 milliGRAM(s) IV Intermittent every 24 hours  azithromycin  IVPB      budesonide 160 MICROgram(s)/formoterol 4.5 MICROgram(s) Inhaler 2 Puff(s) Inhalation two times a day  cefTRIAXone   IVPB 1000 milliGRAM(s) IV Intermittent every 24 hours  lidocaine   4% Patch 1 Patch Transdermal daily  pantoprazole    Tablet 40 milliGRAM(s) Oral before breakfast  predniSONE   Tablet 40 milliGRAM(s) Oral daily  tiotropium 2.5 MICROgram(s) Inhaler 2 Puff(s) Inhalation daily    MEDICATIONS  (PRN):  acetaminophen     Tablet .. 650 milliGRAM(s) Oral every 6 hours PRN Temp greater or equal to 38C (100.4F), Mild Pain (1 - 3)      CAPILLARY BLOOD GLUCOSE        I&O's Summary    07 Jun 2023 07:01  -  08 Jun 2023 07:00  --------------------------------------------------------  IN: 240 mL / OUT: 0 mL / NET: 240 mL        PHYSICAL EXAM:  Vital Signs Last 24 Hrs  T(C): 36.6 (08 Jun 2023 12:07), Max: 36.7 (07 Jun 2023 20:47)  T(F): 97.8 (08 Jun 2023 12:07), Max: 98.1 (08 Jun 2023 04:28)  HR: 85 (08 Jun 2023 12:07) (76 - 95)  BP: 141/89 (08 Jun 2023 12:07) (141/89 - 174/94)  BP(mean): 94 (08 Jun 2023 08:48) (94 - 94)  RR: 18 (08 Jun 2023 12:07) (12 - 20)  SpO2: 97% (08 Jun 2023 12:07) (92% - 97%)    Parameters below as of 08 Jun 2023 12:07  Patient On (Oxygen Delivery Method): room air      CONSTITUTIONAL: NAD, well-developed, well-groomed  EYES: PERRLA; conjunctiva and sclera clear  ENMT: Moist oral mucosa, no pharyngeal injection or exudates; normal dentition  NECK: Supple, no palpable masses; no thyromegaly  RESPIRATORY: Normal respiratory effort; lungs are clear to auscultation bilaterally  CARDIOVASCULAR: Regular rate and rhythm, normal S1 and S2, no murmur/rub/gallop; No lower extremity edema; Peripheral pulses are 2+ bilaterally  ABDOMEN: Nontender to palpation, normoactive bowel sounds, no rebound/guarding; No hepatosplenomegaly  MUSCULOSKELETAL:  Normal gait; no clubbing or cyanosis of digits; no joint swelling or tenderness to palpation  PSYCH: A+O to person, place, and time; affect appropriate  NEUROLOGY: CN 2-12 are intact and symmetric; no gross sensory deficits   SKIN: No rashes; no palpable lesions    LABS:                        13.3   12.91 )-----------( 274      ( 08 Jun 2023 07:05 )             41.7     06-08    143  |  105  |  13  ----------------------------<  84  3.9   |  26  |  0.72    Ca    8.7      08 Jun 2023 07:04    TPro  5.9<L>  /  Alb  x   /  TBili  x   /  DBili  x   /  AST  x   /  ALT  x   /  AlkPhos  x   06-07    PT/INR - ( 08 Jun 2023 07:07 )   PT: 13.8 sec;   INR: 1.20 ratio         PTT - ( 08 Jun 2023 07:07 )  PTT:26.0 sec          Culture - Sputum (collected 07 Jun 2023 18:01)  Source: .Sputum Sputum  Gram Stain (08 Jun 2023 06:15):    Few polymorphonuclear leukocytes per low power field    No Squamous epithelial cells per low power field    Moderate Gram positive cocci in pairs per oil power field    Few Gram Positive Rods per oil power field        RADIOLOGY & ADDITIONAL TESTS:  Results Reviewed:   Imaging Personally Reviewed:  Electrocardiogram Personally Reviewed:    COORDINATION OF CARE:  Care Discussed with Consultants/Other Providers [Y/N]:  Prior or Outpatient Records Reviewed [Y/N]:   [facility] Division of Hospital Medicine  Teresanisreen Marcus DO   Available via Microsoft Teams      Patient is a 63y old  Male who presents with a chief complaint of hypoxia (08 Jun 2023 09:10)      SUBJECTIVE / OVERNIGHT EVENTS:  Feels well after bronch. No SOB, CP, fever, chills     MEDICATIONS  (STANDING):  albuterol/ipratropium for Nebulization 3 milliLiter(s) Nebulizer every 6 hours  atorvastatin 20 milliGRAM(s) Oral at bedtime  azithromycin  IVPB 500 milliGRAM(s) IV Intermittent every 24 hours  azithromycin  IVPB      budesonide 160 MICROgram(s)/formoterol 4.5 MICROgram(s) Inhaler 2 Puff(s) Inhalation two times a day  cefTRIAXone   IVPB 1000 milliGRAM(s) IV Intermittent every 24 hours  lidocaine   4% Patch 1 Patch Transdermal daily  pantoprazole    Tablet 40 milliGRAM(s) Oral before breakfast  predniSONE   Tablet 40 milliGRAM(s) Oral daily  tiotropium 2.5 MICROgram(s) Inhaler 2 Puff(s) Inhalation daily    MEDICATIONS  (PRN):  acetaminophen     Tablet .. 650 milliGRAM(s) Oral every 6 hours PRN Temp greater or equal to 38C (100.4F), Mild Pain (1 - 3)      CAPILLARY BLOOD GLUCOSE        I&O's Summary    07 Jun 2023 07:01  -  08 Jun 2023 07:00  --------------------------------------------------------  IN: 240 mL / OUT: 0 mL / NET: 240 mL        PHYSICAL EXAM:  Vital Signs Last 24 Hrs  T(C): 36.6 (08 Jun 2023 12:07), Max: 36.7 (07 Jun 2023 20:47)  T(F): 97.8 (08 Jun 2023 12:07), Max: 98.1 (08 Jun 2023 04:28)  HR: 85 (08 Jun 2023 12:07) (76 - 95)  BP: 141/89 (08 Jun 2023 12:07) (141/89 - 174/94)  BP(mean): 94 (08 Jun 2023 08:48) (94 - 94)  RR: 18 (08 Jun 2023 12:07) (12 - 20)  SpO2: 97% (08 Jun 2023 12:07) (92% - 97%)    Parameters below as of 08 Jun 2023 12:07  Patient On (Oxygen Delivery Method): room air      CONSTITUTIONAL: NAD, well-developed, well-groomed   EYES: conjunctiva and sclera clear  ENMT: Moist oral mucosa, no pharyngeal injection or exudates  RESPIRATORY: Normal respiratory effort; lungs are clear to auscultation bilaterally  CARDIOVASCULAR: Regular rate and rhythm, normal S1 and S2; No lower extremity edema  ABDOMEN: Nontender to palpation, normoactive bowel sounds, no rebound/guarding  MUSCULOSKELETAL: no clubbing or cyanosis of digits; no joint swelling or tenderness to palpation  PSYCH: A+O to person, place, and time; affect appropriate  NEUROLOGY: CN 2-12 are intact and symmetric; no gross sensory deficits   SKIN: No rashes; no palpable lesions    LABS:                        13.3   12.91 )-----------( 274      ( 08 Jun 2023 07:05 )             41.7     06-08    143  |  105  |  13  ----------------------------<  84  3.9   |  26  |  0.72    Ca    8.7      08 Jun 2023 07:04    TPro  5.9<L>  /  Alb  x   /  TBili  x   /  DBili  x   /  AST  x   /  ALT  x   /  AlkPhos  x   06-07    PT/INR - ( 08 Jun 2023 07:07 )   PT: 13.8 sec;   INR: 1.20 ratio         PTT - ( 08 Jun 2023 07:07 )  PTT:26.0 sec          Culture - Sputum (collected 07 Jun 2023 18:01)  Source: .Sputum Sputum  Gram Stain (08 Jun 2023 06:15):    Few polymorphonuclear leukocytes per low power field    No Squamous epithelial cells per low power field    Moderate Gram positive cocci in pairs per oil power field    Few Gram Positive Rods per oil power field        RADIOLOGY & ADDITIONAL TESTS:  Results Reviewed:   Imaging Personally Reviewed:  Electrocardiogram Personally Reviewed:    COORDINATION OF CARE:  Care Discussed with Consultants/Other Providers [Y/N]: pulm  Prior or Outpatient Records Reviewed [Y/N]:

## 2023-06-08 NOTE — DISCHARGE NOTE PROVIDER - CARE PROVIDER_API CALL
Claudy Guerin.  Pulmonary Disease  [address] [place] [address] [address]  Phone: [phone]  Fax: [phone]  Follow Up Time: 1 week   Claudy Guerin.  Pulmonary Disease  [address] [facility][1], [address]  [place] [address]  Phone: [phone]  Fax: [phone]  Follow Up Time: 1 week    Jasiel Barry  Internal Medicine  [address] [place] [address], [address]  [place], [place] [id number]  Phone: [phone]  Fax: [phone]  Follow Up Time: 1 week

## 2023-06-08 NOTE — DISCHARGE NOTE PROVIDER - NSDCFUADDAPPT_GEN_ALL_CORE_FT
APPTS ARE READY TO BE MADE: [ X] YES    Best Family or Patient Contact (if needed):    Additional Information about above appointments (if needed):    1: Dr. Guerin  2:   3:     Other comments or requests:    APPTS ARE READY TO BE MADE: [ X] YES    Best Family or Patient Contact (if needed):    Additional Information about above appointments (if needed):    1: Dr. Guerin  2:   3:     Other comments or requests:     Patient is scheduled with Dr. Guerin 6/26 1:00pm [facility]    Patient was provided with follow up request details and was advised to call to schedule follow up within specified time frame.

## 2023-06-08 NOTE — PRE PROCEDURE NOTE - PRE PROCEDURE EVALUATION
Attending Physician: Dr Issa                    Procedure: bronchoscopy wit BAL and biopsy    Indication for Procedure: frequent pneumonias, acute hypoxia   ________________________________________________________  PAST MEDICAL & SURGICAL HISTORY:  GERD (gastroesophageal reflux disease)      Bronchitis  winter 2014      Pneumonia      Basal cell carcinoma  removed      Hyperlipidemia      DVT (deep venous thrombosis)  "right lower extremity,after meniscus repair  08/2014 & Had Hemato consult (Prothrombin gene mutation study was normal+LA),treated with Xarelto" & currently on xarelto.      COPD (chronic obstructive pulmonary disease)      Prostate cancer      S/P laminectomy  1990      S/P knee surgery  2013 left meniscus  2015-right meniscus      H/O total knee replacement, left  2016        ALLERGIES:  No Known Allergies    HOME MEDICATIONS:  albuterol 90 mcg/inh inhalation aerosol: 2 puff(s) inhaled every 6 hours, As needed, Shortness of Breath and/or Wheezing  atorvastatin 20 mg oral tablet: 1 tab(s) orally once a day (at bedtime)  Coumadin 5 mg oral tablet: 1 orally  meloxicam 15 mg oral tablet: 1 tab(s) orally once a day  PriLOSEC 40 mg oral delayed release capsule: 1 cap(s) orally once a day  Trelegy Ellipta 200 mcg-62.5 mcg-25 mcg/inh inhalation powder: 1 puff(s) inhaled once a day    AICD/PPM: [ ] yes   [ x] no    PERTINENT LAB DATA:                        13.3   12.91 )-----------( 274      ( 08 Jun 2023 07:05 )             41.7     06-08    143  |  105  |  13  ----------------------------<  84  3.9   |  26  |  0.72    Ca    8.7      08 Jun 2023 07:04    TPro  5.9<L>  /  Alb  x   /  TBili  x   /  DBili  x   /  AST  x   /  ALT  x   /  AlkPhos  x   06-07    PT/INR - ( 08 Jun 2023 07:07 )   PT: 13.8 sec;   INR: 1.20 ratio         PTT - ( 08 Jun 2023 07:07 )  PTT:26.0 sec            PHYSICAL EXAMINATION:    T(C): 36.7  HR: 80  BP: 152/81  RR: 18  SpO2: 96%    Constitutional: NAD    Neck:  No JVD  Respiratory: no respiratory distress   Cardiovascular: RRR  Extremities: No peripheral edema  Neurological: A/O x 4, no focal deficits        COMMENTS:    The patient is a suitable candidate for the planned procedure unless box checked [ ]  No, explain:

## 2023-06-08 NOTE — DISCHARGE NOTE PROVIDER - NSDCMRMEDTOKEN_GEN_ALL_CORE_FT
albuterol 90 mcg/inh inhalation aerosol: 2 puff(s) inhaled every 6 hours, As needed, Shortness of Breath and/or Wheezing  atorvastatin 20 mg oral tablet: 1 tab(s) orally once a day (at bedtime)  Coumadin 5 mg oral tablet: 1 orally  meloxicam 15 mg oral tablet: 1 tab(s) orally once a day  PriLOSEC 40 mg oral delayed release capsule: 1 cap(s) orally once a day  Trelegy Ellipta 200 mcg-62.5 mcg-25 mcg/inh inhalation powder: 1 puff(s) inhaled once a day   albuterol 90 mcg/inh inhalation aerosol: 2 puff(s) inhaled every 6 hours, As needed, Shortness of Breath and/or Wheezing  atorvastatin 20 mg oral tablet: 1 tab(s) orally once a day (at bedtime)  cefuroxime 500 mg oral tablet: 1 tab(s) orally 2 times a day last dose 6/10/23  meloxicam 15 mg oral tablet: 1 tab(s) orally once a day  PriLOSEC 40 mg oral delayed release capsule: 1 cap(s) orally once a day  rivaroxaban 20 mg oral tablet: 1 tab(s) orally once a day (before a meal)  Trelegy Ellipta 200 mcg-62.5 mcg-25 mcg/inh inhalation powder: 1 puff(s) inhaled once a day   albuterol 90 mcg/inh inhalation aerosol: 2 puff(s) inhaled every 6 hours, As needed, Shortness of Breath and/or Wheezing  atorvastatin 20 mg oral tablet: 1 tab(s) orally once a day (at bedtime)  cefuroxime 500 mg oral tablet: 1 tab(s) orally 2 times a day last dose 6/10/23  meloxicam 15 mg oral tablet: 1 tab(s) orally once a day  predniSONE 10 mg oral tablet: 4 tab(s) orally once a day Prednisone 40mg by mouth once a day for 2 days until 6/11/23  Prednisone 30mg by mouth once a day starting 6/12/23 until seen by Pulmonary  PriLOSEC 40 mg oral delayed release capsule: 1 cap(s) orally once a day  rivaroxaban 20 mg oral tablet: 1 tab(s) orally once a day (before a meal)  Trelegy Ellipta 200 mcg-62.5 mcg-25 mcg/inh inhalation powder: 1 puff(s) inhaled once a day

## 2023-06-08 NOTE — PROGRESS NOTE ADULT - PROBLEM SELECTOR PLAN 1
CTA chest with R sided PNA  -Recent L sided PNA in May  -Multiple episodes of PNA over the past year. F/u immunoglobulin panel. F/u protein electrophoresis   -Urine legionella negative  -MRSA/MSSA nasal PCR negative   -Trend leukocytosis, fever curve. Uptrending, likely 2nd to steroids  -? organizing PNA. Suggest continue steroids. Prednisone 40mg PO qd x 7 days then decreased until 30mg PO qd until f/u in office.   -PPI while on steroids   -S/p Bronch with biopsy today. F/u cultures, path.   -Observe overnight. D/c planning tomorrow with ceftin 500mg PO BID to complete 7 days. CTA chest with R sided PNA  -Recent L sided PNA in May  -Multiple episodes of PNA over the past year. F/u immunoglobulin panel. F/u protein electrophoresis   -Urine legionella negative  -MRSA/MSSA nasal PCR negative   -Trend leukocytosis, fever curve. Uptrending, likely 2nd to steroids  -? organizing PNA. Suggest continue steroids. Prednisone 40mg PO qd x 7 days then decreased until 30mg PO qd until f/u in office.   -PPI while on steroids   -S/p Bronch with biopsy today. F/u cultures, path.   -Observe overnight. D/c planning tomorrow with ceftin 500mg PO BID to complete 7 days total antibiotics.

## 2023-06-08 NOTE — DISCHARGE NOTE PROVIDER - HOSPITAL COURSE
64yo M pmh COPD (on intermittent home O2 2L prn-uses about 1x monthly), DVT/PE on coumadin (transitioned from xarelto 6 weeks ago, recently SUPRATHERAPEUTIC with doses held until day PTA), prostate cancer planning for radiation, Right knee OA, s/p L knee replacement 2014, multiple recurrent pneumonias in past 6 months presents 6/5 with acute hypoxic respiratory failure due to suspected acute bacterial pneumonia improving with abx in ED.      Problem/Plan - 1:  ·  Problem: Sepsis with acute hypoxic respiratory failure.   ·  Plan: febrile, tachy, wbc on admission.  Not severe as no end organ damage and lactate 1.6  received abx and 500cc ns.  treat pna as below  Nasal canula 2L to maintain saturation>88%, now on room air saturating at 97%  unlikely cardiac as BNP <36, trop 7 and no overload on CXR/CT.     Problem/Plan - 2:  ·  Problem: Pneumonia.   ·  Plan: acute bacterial pneumonia.  patient with recurrent pneumonias-unclear why  appreciate pulm c/s  legionella neg  sputum cx with some gram + cocci and rods  s/p bronch - f/u BAL. post procedure CXR reviewed, no pneumo   ANCA neg  Serum electrophoresis neg  d/w pulm- planned for 7 day course of abx. Can transition to PO on dc to complete 7 day course.     Problem/Plan - 3:  ·  Problem: COPD with pneumonia.   ·  Plan: appreciate pulm c/s  steroids inhaled/systemic per pulm  Will be on steroid taper on dc.     Problem/Plan - 4:  ·  Problem: Venous thromboembolism (VTE).   ·  Plan: has known recent VTE.  Per wife, he was treated on xarelto for years given h/o LA that was positive, then negative.  Initial clot was after knee surgery 2014.  He stopped xarelto ~November 2022 and within 3 months had developed dvt.  Was resumed on xarelto but then had PE and was transitioned to Coumadin.  However this was done via an outpatient xarelto coumadin bridge and he was not therapeutic for several weeks where he was maintained on both drugs.  He then was supratherapeutic on coumadin with INR>4 and coumadin was held until night prior to admission.  Heme c/s appreciated, can be on xarelto. To resume AC 24 hours post bronch.     Problem/Plan - 5:  ·  Problem: Osteoarthritis.   ·  Plan: tylenol prn pain  hold meloxicam given AC  lido patch.     64yo M pmh COPD (on intermittent home O2 2L prn-uses about 1x monthly), DVT/PE on coumadin (transitioned from xarelto 6 weeks ago, recently SUPRATHERAPEUTIC with doses held until day PTA), prostate cancer planning for radiation, Right knee OA, s/p L knee replacement 2014, multiple recurrent pneumonias in past 6 months presents 6/5 with acute hypoxic respiratory failure due to suspected acute bacterial pneumonia improving with abx in ED.      Problem/Plan - 1:  ·  Problem: Sepsis with acute hypoxic respiratory failure.   ·  Plan: febrile, tachy, wbc on admission.  Not severe as no end organ damage and lactate 1.6  received abx and 500cc ns.  treat pna as below  Nasal canula 2L to maintain saturation>88%, now on room air saturating at 97%  unlikely cardiac as BNP <36, trop 7 and no overload on CXR/CT.     Problem/Plan - 2:  ·  Problem: Pneumonia.   ·  Plan: acute bacterial pneumonia.  patient with recurrent pneumonias-unclear why  appreciate pulm c/s  legionella neg  sputum cx with some gram + cocci and rods  s/p bronch - f/u BAL. post procedure CXR reviewed, no pneumo   ANCA neg  Serum electrophoresis neg  d/w pulm- planned for 7 day course of abx. Can transition to PO on dc to complete 7 day course.     Problem/Plan - 3:  ·  Problem: COPD with pneumonia.   ·  Plan: appreciate pulm c/s  steroids inhaled/systemic per pulm  Will be on steroid taper on dc.  outpatient follow-up with pulmonary      Problem/Plan - 4:  ·  Problem: Venous thromboembolism (VTE).   ·  Plan: has known recent VTE.  Per wife, he was treated on xarelto for years given h/o LA that was positive, then negative.  Initial clot was after knee surgery 2014.  He stopped xarelto ~November 2022 and within 3 months had developed dvt.  Was resumed on xarelto but then had PE and was transitioned to Coumadin.  However this was done via an outpatient xarelto coumadin bridge and he was not therapeutic for several weeks where he was maintained on both drugs.  He then was supratherapeutic on coumadin with INR>4 and coumadin was held until night prior to admission.  Heme c/s appreciated, can be on xarelto. To resume AC 24 hours post bronch on 6/9/23.     Problem/Plan - 5:  ·  Problem: Osteoarthritis.   ·  Plan: tylenol prn pain  hold meloxicam given AC  lido patch.    Medically cleared for discharge with outpatient PCP and pulmonary follow-up

## 2023-06-08 NOTE — DISCHARGE NOTE PROVIDER - NSDCDCMDCOMP_GEN_ALL_CORE
Patient picked up: prescription.   Identity was verified: Yes.        This document is complete and the patient is ready for discharge.

## 2023-06-08 NOTE — DISCHARGE NOTE PROVIDER - NSDCCPCAREPLAN_GEN_ALL_CORE_FT
PRINCIPAL DISCHARGE DIAGNOSIS  Diagnosis: COPD exacerbation  Assessment and Plan of Treatment: Call your Health Care provider upon arrival home to make a follow up appointment within one week.  Take all inhalers as prescribed by your Health Care Provider.  Take steroids as prescribed by your Health Care Provider.  If your cough increases infrequency and severity and/or you have shortness of breath or increased shortness of breath call your Health Care Provider.  If you develop fever, chills, night sweats, malaise, and/or change in mental status call your Health care Provider.  Nutrition is very important.  Eat small frequent meals.  Increase your activity as tolerated.  Do not stay in bed all day        SECONDARY DISCHARGE DIAGNOSES  Diagnosis: Pneumonia  Assessment and Plan of Treatment: Pneumonia is a lung infection that can cause a fever, cough, and trouble breathing.  Continue all antibiotics as ordered until complete.  Nutrition is important, eat small frequent meals.  Get lots of rest and drink fluids.  Call your health care provider upon arrival home from hospital and make a follow up appointment for one week.  If your cough worsens, you develop fever greater than 101', you have shaking chills, a fast heartbeat, trouble breathing and/or feel your are breathing much faster than usual, call your healthcare provider.  Make sure you wash your hands frequently.

## 2023-06-08 NOTE — CONSULT NOTE ADULT - ASSESSMENT
63M PMHx COPD (on intermittent home O2 2L prn-uses about 1x monthly), DVT/PE on coumadin (transitioned from xarelto 6 weeks ago, recently SUPRATHERAPEUTIC with doses held until day PTA), prostate cancer planning for radiation, Right knee OA, s/p L knee replacement 2014, multiple recurrent pneumonias, presents to the hospital with cough and SOB. IP consulted for bronchoscopic evaluation.    #Abnormal CT chest:  - patient with multiple admissions for PNA with CT chest showing recurrent pulmonary infiltrates  - s/p multiple courses of abx over the past 6-8 months  - now s/p bronchoscopy with BAL and transbronchial biopsy of the right lower lobe  - ddx includes infectious PNA vs inflammatory etiology such as organizing PNA     Recommendations:  - bronchoscopy revealed mucoid secretions in the right lower lobe; s/p BAL  - patient underwent transbronchial biopsy of RLL  - follow up CXR to evaluate for pneumothorax  - follow up BAL cell count to evaluate for eosinophilia and culture  - follow up path for transbronchial biopsy    Discussed with Dr. Cuellar 63M PMHx COPD (on intermittent home O2 2L prn-uses about 1x monthly), DVT/PE on coumadin (transitioned from xarelto 6 weeks ago, recently SUPRATHERAPEUTIC with doses held until day PTA), prostate cancer planning for radiation, Right knee OA, s/p L knee replacement 2014, multiple recurrent pneumonias, presents to the hospital with cough and SOB. IP consulted for bronchoscopic evaluation.    #Abnormal CT chest:  - patient with multiple admissions for PNA with CT chest showing recurrent pulmonary infiltrates  - s/p multiple courses of abx over the past 6-8 months  - now s/p bronchoscopy with BAL and transbronchial biopsy of the right lower lobe  - ddx includes infectious PNA vs inflammatory etiology such as organizing PNA     Recommendations:  - bronchoscopy revealed mucoid secretions in the right lower lobe; s/p BAL  - patient underwent transbronchial biopsy of RLL  - follow up CXR to evaluate for pneumothorax  - follow up BAL cell count to evaluate for eosinophilia and culture  - follow up path for transbronchial biopsy  - can restart full dose AC on 6/9/2023    Discussed with Dr. Cuellar

## 2023-06-08 NOTE — CONSULT NOTE ADULT - SUBJECTIVE AND OBJECTIVE BOX
63M PMHx COPD (on intermittent home O2 2L prn-uses about 1x monthly), DVT/PE on coumadin (transitioned from xarelto 6 weeks ago, recently SUPRATHERAPEUTIC with doses held until day PTA), prostate cancer planning for radiation, Right knee OA, s/p L knee replacement 2014, multiple recurrent pneumonias in past 6 months was in his usual state of health until about 11pm last night when he noted a hard time breathing for which he used his home oxygen.  He also had chills/sweats and a fever to 101 at 5am.  He had taken 3 tylenol, but his pulse ox was 80% on 4L O2 so he came to ER. Reports dry cough where he feels there is mucus he can't get out.   Presentation is similar to prior pneumonias per patient.    In the ED received azithro, cefepime, 500 NS, tylenol, duoneb, solumedrol.    CT chest showing right sided GGO. IP consulted for bronchoscopic evaluation        PAST MEDICAL & SURGICAL HISTORY:  GERD (gastroesophageal reflux disease)      Bronchitis  winter 2014      Pneumonia      Basal cell carcinoma  removed      Hyperlipidemia      DVT (deep venous thrombosis)  "right lower extremity,after meniscus repair  08/2014 & Had Hemato consult (Prothrombin gene mutation study was normal+LA),treated with Xarelto" & currently on xarelto.      COPD (chronic obstructive pulmonary disease)      Prostate cancer      S/P laminectomy  1990      S/P knee surgery  2013 left meniscus  2015-right meniscus      H/O total knee replacement, left  2016          FAMILY HISTORY:  Family history of Alzheimer's disease (Mother)    Family history of heart attack (Father)  father at age 65        SOCIAL HISTORY:  Smoking: [ ] Never Smoked [ ] Former Smoker (__ packs x ___ years) [ ] Current Smoker  (__ packs x ___ years)  Substance Use: [ ] Never Used [ ] Used ____  EtOH Use:  Marital Status: [ ] Single [ ]  [ ]  [ ]   Sexual History:   Occupation:  Recent Travel:  Country of Birth:  Advance Directives:    Allergies    No Known Allergies    Intolerances        HOME MEDICATIONS:    REVIEW OF SYSTEMS:  Constitutional: [X] negative [ ] fevers [ ] chills [ ] weight loss [ ] weight gain  HEENT: [ X] negative [ ] dry eyes [ ] eye irritation [ ] postnasal drip [ ] nasal congestion  CV: [X ] negative  [ ] chest pain [ ] orthopnea [ ] palpitations [ ] murmur  Resp: [X ] negative [ ] cough [ ] shortness of breath [ ] dyspnea [ ] wheezing [ ] sputum [ ] hemoptysis  GI: [ X] negative [ ] nausea [ ] vomiting [ ] diarrhea [ ] constipation [ ] abd pain [ ] dysphagia   : [X ] negative [ ] dysuria [ ] nocturia [ ] hematuria [ ] increased urinary frequency  Musculoskeletal: [ ] negative [ ] back pain [ ] myalgias [ ] arthralgias [ ] fracture  Skin: [ ] negative [ ] rash [ ] itch  Neurological: [ ] negative [ ] headache [ ] dizziness [ ] syncope [ ] weakness [ ] numbness  Psychiatric: [ ] negative [ ] anxiety [ ] depression  Endocrine: [ ] negative [ ] diabetes [ ] thyroid problem  Hematologic/Lymphatic: [ ] negative [ ] anemia [ ] bleeding problem  Allergic/Immunologic: [ ] negative [ ] itchy eyes [ ] nasal discharge [ ] hives [ ] angioedema  [ ] All other systems negative  [ ] Unable to assess ROS because ________    OBJECTIVE:  ICU Vital Signs Last 24 Hrs  T(C): 36.3 (08 Jun 2023 08:48), Max: 36.7 (07 Jun 2023 12:11)  T(F): 97.4 (08 Jun 2023 08:01), Max: 98.1 (07 Jun 2023 12:11)  HR: 76 (08 Jun 2023 08:48) (76 - 87)  BP: 174/94 (08 Jun 2023 08:48) (144/83 - 174/94)  BP(mean): 94 (08 Jun 2023 08:48) (94 - 94)  ABP: --  ABP(mean): --  RR: 12 (08 Jun 2023 08:48) (12 - 18)  SpO2: 96% (08 Jun 2023 08:48) (93% - 96%)    O2 Parameters below as of 08 Jun 2023 08:48    O2 Flow (L/min): 2            06-07 @ 07:01  -  06-08 @ 07:00  --------------------------------------------------------  IN: 240 mL / OUT: 0 mL / NET: 240 mL      CAPILLARY BLOOD GLUCOSE          PHYSICAL EXAM:  General: well appear NAD, sitting in bed  HEENT: no icterus  Neck: supple  Respiratory: clear to auscultation   Cardiovascular: s1/s2, rrr  Abdomen: soft ,nontender   Extremities: no LE edema  Skin: no rashes  Neurological: AxOx3  Psychiatry: normal mood and affect    HOSPITAL MEDICATIONS:    azithromycin  IVPB 500 milliGRAM(s) IV Intermittent every 24 hours  azithromycin  IVPB      cefTRIAXone   IVPB 1000 milliGRAM(s) IV Intermittent every 24 hours      atorvastatin 20 milliGRAM(s) Oral at bedtime  predniSONE   Tablet 40 milliGRAM(s) Oral daily    albuterol/ipratropium for Nebulization 3 milliLiter(s) Nebulizer every 6 hours  budesonide 160 MICROgram(s)/formoterol 4.5 MICROgram(s) Inhaler 2 Puff(s) Inhalation two times a day  tiotropium 2.5 MICROgram(s) Inhaler 2 Puff(s) Inhalation daily    acetaminophen     Tablet .. 650 milliGRAM(s) Oral every 6 hours PRN    pantoprazole    Tablet 40 milliGRAM(s) Oral before breakfast            lidocaine   4% Patch 1 Patch Transdermal daily        LABS:                        13.3   12.91 )-----------( 274      ( 08 Jun 2023 07:05 )             41.7     Hgb Trend: 13.3<--, 12.3<--, 12.9<--, 13.2<--  06-08    143  |  105  |  13  ----------------------------<  84  3.9   |  26  |  0.72    Ca    8.7      08 Jun 2023 07:04    TPro  5.9<L>  /  Alb  x   /  TBili  x   /  DBili  x   /  AST  x   /  ALT  x   /  AlkPhos  x   06-07    Creatinine Trend: 0.72<--, 0.74<--, 0.63<--, 0.90<--  PT/INR - ( 08 Jun 2023 07:07 )   PT: 13.8 sec;   INR: 1.20 ratio         PTT - ( 08 Jun 2023 07:07 )  PTT:26.0 sec          MICROBIOLOGY:     RADIOLOGY:  [ ] Reviewed and interpreted by me    PULMONARY FUNCTION TESTS:    EKG: 63M PMHx COPD (on intermittent home O2 2L prn-uses about 1x monthly), DVT/PE on coumadin (transitioned from xarelto 6 weeks ago, recently SUPRATHERAPEUTIC with doses held until day PTA), prostate cancer planning for radiation, Right knee OA, s/p L knee replacement 2014, multiple recurrent pneumonias in past 6 months was in his usual state of health until about 11pm last night when he noted a hard time breathing for which he used his home oxygen.  He also had chills/sweats and a fever to 101 at 5am.  He had taken 3 tylenol, but his pulse ox was 80% on 4L O2 so he came to ER. Reports dry cough where he feels there is mucus he can't get out.   Presentation is similar to prior pneumonias per patient.    In the ED received azithro, cefepime, 500 NS, tylenol, duoneb, solumedrol.    CT chest showing right sided GGO. IP consulted for bronchoscopic evaluation        PAST MEDICAL & SURGICAL HISTORY:  GERD (gastroesophageal reflux disease)      Bronchitis  winter 2014      Pneumonia      Basal cell carcinoma  removed      Hyperlipidemia      DVT (deep venous thrombosis)  "right lower extremity,after meniscus repair  08/2014 & Had Hemato consult (Prothrombin gene mutation study was normal+LA),treated with Xarelto" & currently on xarelto.      COPD (chronic obstructive pulmonary disease)      Prostate cancer      S/P laminectomy  1990      S/P knee surgery  2013 left meniscus  2015-right meniscus      H/O total knee replacement, left  2016          FAMILY HISTORY:  Family history of Alzheimer's disease (Mother)    Family history of heart attack (Father)  father at age 65        SOCIAL HISTORY:  Smoking: [ ] Never Smoked [ ] Former Smoker (__ packs x ___ years) [ ] Current Smoker  (__ packs x ___ years)  Substance Use: [ ] Never Used [ ] Used ____  EtOH Use:  Marital Status: [ ] Single [ ]  [ ]  [ ]   Sexual History:   Occupation:  Recent Travel:  Country of Birth:  Advance Directives:    Allergies    No Known Allergies    Intolerances        HOME MEDICATIONS:    REVIEW OF SYSTEMS:  Constitutional: [X] negative [ ] fevers [ ] chills [ ] weight loss [ ] weight gain  HEENT: [ X] negative [ ] dry eyes [ ] eye irritation [ ] postnasal drip [ ] nasal congestion  CV: [X ] negative  [ ] chest pain [ ] orthopnea [ ] palpitations [ ] murmur  Resp: [X ] negative [ ] cough [ ] shortness of breath [ ] dyspnea [ ] wheezing [ ] sputum [ ] hemoptysis  GI: [ X] negative [ ] nausea [ ] vomiting [ ] diarrhea [ ] constipation [ ] abd pain [ ] dysphagia   : [X ] negative [ ] dysuria [ ] nocturia [ ] hematuria [ ] increased urinary frequency  Musculoskeletal: [ ] negative [ ] back pain [ ] myalgias [ ] arthralgias [ ] fracture  Skin: [ ] negative [ ] rash [ ] itch  Neurological: [ ] negative [ ] headache [ ] dizziness [ ] syncope [ ] weakness [ ] numbness  Psychiatric: [ ] negative [ ] anxiety [ ] depression  Endocrine: [ ] negative [ ] diabetes [ ] thyroid problem  Hematologic/Lymphatic: [ ] negative [ ] anemia [ ] bleeding problem  Allergic/Immunologic: [ ] negative [ ] itchy eyes [ ] nasal discharge [ ] hives [ ] angioedema  [ ] All other systems negative  [ ] Unable to assess ROS because ________    OBJECTIVE:  ICU Vital Signs Last 24 Hrs  T(C): 36.3 (08 Jun 2023 08:48), Max: 36.7 (07 Jun 2023 12:11)  T(F): 97.4 (08 Jun 2023 08:01), Max: 98.1 (07 Jun 2023 12:11)  HR: 76 (08 Jun 2023 08:48) (76 - 87)  BP: 174/94 (08 Jun 2023 08:48) (144/83 - 174/94)  BP(mean): 94 (08 Jun 2023 08:48) (94 - 94)  ABP: --  ABP(mean): --  RR: 12 (08 Jun 2023 08:48) (12 - 18)  SpO2: 96% (08 Jun 2023 08:48) (93% - 96%)    O2 Parameters below as of 08 Jun 2023 08:48    O2 Flow (L/min): 2            06-07 @ 07:01  -  06-08 @ 07:00  --------------------------------------------------------  IN: 240 mL / OUT: 0 mL / NET: 240 mL      CAPILLARY BLOOD GLUCOSE          PHYSICAL EXAM:  General: well appear NAD, sitting in bed, obese  HEENT: no icterus  Neck: supple  Respiratory: clear to auscultation   Cardiovascular: s1/s2, rrr  Abdomen: soft ,nontender   Extremities: no LE edema  Skin: no rashes  Neurological: AxOx3  Psychiatry: normal mood and affect    HOSPITAL MEDICATIONS:    azithromycin  IVPB 500 milliGRAM(s) IV Intermittent every 24 hours  azithromycin  IVPB      cefTRIAXone   IVPB 1000 milliGRAM(s) IV Intermittent every 24 hours      atorvastatin 20 milliGRAM(s) Oral at bedtime  predniSONE   Tablet 40 milliGRAM(s) Oral daily    albuterol/ipratropium for Nebulization 3 milliLiter(s) Nebulizer every 6 hours  budesonide 160 MICROgram(s)/formoterol 4.5 MICROgram(s) Inhaler 2 Puff(s) Inhalation two times a day  tiotropium 2.5 MICROgram(s) Inhaler 2 Puff(s) Inhalation daily    acetaminophen     Tablet .. 650 milliGRAM(s) Oral every 6 hours PRN    pantoprazole    Tablet 40 milliGRAM(s) Oral before breakfast            lidocaine   4% Patch 1 Patch Transdermal daily        LABS:                        13.3   12.91 )-----------( 274      ( 08 Jun 2023 07:05 )             41.7     Hgb Trend: 13.3<--, 12.3<--, 12.9<--, 13.2<--  06-08    143  |  105  |  13  ----------------------------<  84  3.9   |  26  |  0.72    Ca    8.7      08 Jun 2023 07:04    TPro  5.9<L>  /  Alb  x   /  TBili  x   /  DBili  x   /  AST  x   /  ALT  x   /  AlkPhos  x   06-07    Creatinine Trend: 0.72<--, 0.74<--, 0.63<--, 0.90<--  PT/INR - ( 08 Jun 2023 07:07 )   PT: 13.8 sec;   INR: 1.20 ratio         PTT - ( 08 Jun 2023 07:07 )  PTT:26.0 sec          MICROBIOLOGY:     RADIOLOGY:  [ ] Reviewed and interpreted by me    PULMONARY FUNCTION TESTS:    EKG: Interventional Pulmonology Consultation Note    63M PMHx COPD (on intermittent home O2 2L prn-uses about 1x monthly), DVT/PE on coumadin (transitioned from xarelto 6 weeks ago, recently SUPRATHERAPEUTIC with doses held until day PTA), prostate cancer planning for radiation, Right knee OA, s/p L knee replacement 2014, multiple recurrent pneumonias in past 6 months was in his usual state of health until about 11pm last night when he noted a hard time breathing for which he used his home oxygen.  He also had chills/sweats and a fever to 101 at 5am.  He had taken 3 tylenol, but his pulse ox was 80% on 4L O2 so he came to ER. Reports dry cough where he feels there is mucus he can't get out.   Presentation is similar to prior pneumonias per patient.    In the ED received azithro, cefepime, 500 NS, tylenol, duoneb, solumedrol.    CT chest showing right sided GGO. IP consulted for bronchoscopic evaluation        PAST MEDICAL & SURGICAL HISTORY:  GERD (gastroesophageal reflux disease)      Bronchitis  winter 2014      Pneumonia      Basal cell carcinoma  removed      Hyperlipidemia      DVT (deep venous thrombosis)  "right lower extremity,after meniscus repair  08/2014 & Had Hemato consult (Prothrombin gene mutation study was normal+LA),treated with Xarelto" & currently on xarelto.      COPD (chronic obstructive pulmonary disease)      Prostate cancer      S/P laminectomy  1990      S/P knee surgery  2013 left meniscus  2015-right meniscus      H/O total knee replacement, left  2016          FAMILY HISTORY:  Family history of Alzheimer's disease (Mother)    Family history of heart attack (Father)  father at age 65        SOCIAL HISTORY:  Smoking: [ ] Never Smoked [ ] Former Smoker (__ packs x ___ years) [ ] Current Smoker  (__ packs x ___ years)  Substance Use: [ ] Never Used [ ] Used ____  EtOH Use:  Marital Status: [ ] Single [ ]  [ ]  [ ]   Sexual History:   Occupation:  Recent Travel:  Country of Birth:  Advance Directives:    Allergies    No Known Allergies    Intolerances        HOME MEDICATIONS:    REVIEW OF SYSTEMS:  Constitutional: [X] negative [ ] fevers [ ] chills [ ] weight loss [ ] weight gain  HEENT: [ X] negative [ ] dry eyes [ ] eye irritation [ ] postnasal drip [ ] nasal congestion  CV: [X ] negative  [ ] chest pain [ ] orthopnea [ ] palpitations [ ] murmur  Resp: [X ] negative [ ] cough [ ] shortness of breath [ ] dyspnea [ ] wheezing [ ] sputum [ ] hemoptysis  GI: [ X] negative [ ] nausea [ ] vomiting [ ] diarrhea [ ] constipation [ ] abd pain [ ] dysphagia   : [X ] negative [ ] dysuria [ ] nocturia [ ] hematuria [ ] increased urinary frequency  Musculoskeletal: [ ] negative [ ] back pain [ ] myalgias [ ] arthralgias [ ] fracture  Skin: [ ] negative [ ] rash [ ] itch  Neurological: [ ] negative [ ] headache [ ] dizziness [ ] syncope [ ] weakness [ ] numbness  Psychiatric: [ ] negative [ ] anxiety [ ] depression  Endocrine: [ ] negative [ ] diabetes [ ] thyroid problem  Hematologic/Lymphatic: [ ] negative [ ] anemia [ ] bleeding problem  Allergic/Immunologic: [ ] negative [ ] itchy eyes [ ] nasal discharge [ ] hives [ ] angioedema  [ ] All other systems negative  [ ] Unable to assess ROS because ________    OBJECTIVE:  ICU Vital Signs Last 24 Hrs  T(C): 36.3 (08 Jun 2023 08:48), Max: 36.7 (07 Jun 2023 12:11)  T(F): 97.4 (08 Jun 2023 08:01), Max: 98.1 (07 Jun 2023 12:11)  HR: 76 (08 Jun 2023 08:48) (76 - 87)  BP: 174/94 (08 Jun 2023 08:48) (144/83 - 174/94)  BP(mean): 94 (08 Jun 2023 08:48) (94 - 94)  ABP: --  ABP(mean): --  RR: 12 (08 Jun 2023 08:48) (12 - 18)  SpO2: 96% (08 Jun 2023 08:48) (93% - 96%)    O2 Parameters below as of 08 Jun 2023 08:48    O2 Flow (L/min): 2            06-07 @ 07:01  -  06-08 @ 07:00  --------------------------------------------------------  IN: 240 mL / OUT: 0 mL / NET: 240 mL      CAPILLARY BLOOD GLUCOSE          PHYSICAL EXAM:  General: well appear NAD, sitting in bed, obese  HEENT: no icterus  Neck: supple  Respiratory: clear to auscultation   Cardiovascular: s1/s2, rrr  Abdomen: soft ,nontender   Extremities: no LE edema  Skin: no rashes  Neurological: AxOx3  Psychiatry: normal mood and affect    HOSPITAL MEDICATIONS:    azithromycin  IVPB 500 milliGRAM(s) IV Intermittent every 24 hours  azithromycin  IVPB      cefTRIAXone   IVPB 1000 milliGRAM(s) IV Intermittent every 24 hours      atorvastatin 20 milliGRAM(s) Oral at bedtime  predniSONE   Tablet 40 milliGRAM(s) Oral daily    albuterol/ipratropium for Nebulization 3 milliLiter(s) Nebulizer every 6 hours  budesonide 160 MICROgram(s)/formoterol 4.5 MICROgram(s) Inhaler 2 Puff(s) Inhalation two times a day  tiotropium 2.5 MICROgram(s) Inhaler 2 Puff(s) Inhalation daily    acetaminophen     Tablet .. 650 milliGRAM(s) Oral every 6 hours PRN    pantoprazole    Tablet 40 milliGRAM(s) Oral before breakfast            lidocaine   4% Patch 1 Patch Transdermal daily        LABS:                        13.3   12.91 )-----------( 274      ( 08 Jun 2023 07:05 )             41.7     Hgb Trend: 13.3<--, 12.3<--, 12.9<--, 13.2<--  06-08    143  |  105  |  13  ----------------------------<  84  3.9   |  26  |  0.72    Ca    8.7      08 Jun 2023 07:04    TPro  5.9<L>  /  Alb  x   /  TBili  x   /  DBili  x   /  AST  x   /  ALT  x   /  AlkPhos  x   06-07    Creatinine Trend: 0.72<--, 0.74<--, 0.63<--, 0.90<--  PT/INR - ( 08 Jun 2023 07:07 )   PT: 13.8 sec;   INR: 1.20 ratio         PTT - ( 08 Jun 2023 07:07 )  PTT:26.0 sec          MICROBIOLOGY:     RADIOLOGY:  [ ] Reviewed and interpreted by me    PULMONARY FUNCTION TESTS:    EKG:

## 2023-06-08 NOTE — DISCHARGE NOTE PROVIDER - PROVIDER TOKENS
PROVIDER:[TOKEN:[[id number]],FOLLOWUP:[1 week]] PROVIDER:[TOKEN:[[id number]],FOLLOWUP:[1 week]],PROVIDER:[TOKEN:[[id number]],FOLLOWUP:[1 week]]

## 2023-06-08 NOTE — DISCHARGE NOTE PROVIDER - NSDCFUSCHEDAPPT_GEN_ALL_CORE_FT
Valerie Reid  [facility]  Melyssa Davis  Scheduled Appointment: 07/10/2023

## 2023-06-08 NOTE — PRE-ANESTHESIA EVALUATION ADULT - NSANTHPMHFT_GEN_ALL_CORE
63M PMH obesity, HLD, GERD, prostate CA, former smoker, COPD on intermittent home O2, DVT/PE p/w PNA.

## 2023-06-08 NOTE — CONSULT NOTE ADULT - ATTENDING COMMENTS
63-yr-old man with h/o a provoked DVT in 2014, on Xarelto until 12/2022 for positive LA testing, taken off AC since 12/2022 as LA test turned negative while off Xarelto, developed another DVT, restarted on Xarelto, but found to have developed PE while on Xarelto. Now on LMWH, being transitioned to VKA. I don't think it's a Xarelto failure; the LE clot has dislodged and embolized to develop PE, which can happen while on Xarelto if the DVT has not completely resolved. Patient's current VTE is likely due to the prostate cancer (waiting for). Ideally, AC can be discontinued if the cancer is cured. However, given he had an episode of DVT in the past, he will be better off with indefinite AC. I would consider restarting Xarelto rather than changing it to VKA.
Agree with assessment and plan as stated above. patient with extensive past medical history with persistent non-resolving infiltrates despite antibiotics. Given persistence findings, we discussed the differential includes inflammatory conditions and infectious etiology, especially atypical infections not responding to antibiotics. We discussed the role of bronchoscopic biopsy to determine the next steps.    Risks of the procedure including but not limited to pneumothorax and bleeding discussed, and possible interventions in case those are noted also discussed extensively. Alternative modalities of sampling as well as option to pursue surveillance imaging also discussed. After thorough risk benefit discussion and answering all questions related to the procedure, patient is agreeable to proceed with proposed intervention.

## 2023-06-08 NOTE — PROGRESS NOTE ADULT - SUBJECTIVE AND OBJECTIVE BOX
Follow-up Pulm Progress Note    S/p bronch with TBB this AM   denies SOB/CP    Medications:  MEDICATIONS  (STANDING):  atorvastatin 20 milliGRAM(s) Oral at bedtime  budesonide 160 MICROgram(s)/formoterol 4.5 MICROgram(s) Inhaler 2 Puff(s) Inhalation two times a day  cefTRIAXone   IVPB 1000 milliGRAM(s) IV Intermittent every 24 hours  lidocaine   4% Patch 1 Patch Transdermal daily  pantoprazole    Tablet 40 milliGRAM(s) Oral before breakfast  predniSONE   Tablet 40 milliGRAM(s) Oral daily  tiotropium 2.5 MICROgram(s) Inhaler 2 Puff(s) Inhalation daily    MEDICATIONS  (PRN):  acetaminophen     Tablet .. 650 milliGRAM(s) Oral every 6 hours PRN Temp greater or equal to 38C (100.4F), Mild Pain (1 - 3)  albuterol/ipratropium for Nebulization 3 milliLiter(s) Nebulizer every 6 hours PRN Shortness of Breath and/or Wheezing          Vital Signs Last 24 Hrs  T(C): 36.6 (08 Jun 2023 12:07), Max: 36.7 (07 Jun 2023 20:47)  T(F): 97.8 (08 Jun 2023 12:07), Max: 98.1 (08 Jun 2023 04:28)  HR: 85 (08 Jun 2023 12:07) (76 - 95)  BP: 141/89 (08 Jun 2023 12:07) (141/89 - 174/94)  BP(mean): 94 (08 Jun 2023 08:48) (94 - 94)  RR: 18 (08 Jun 2023 12:07) (12 - 20)  SpO2: 97% (08 Jun 2023 12:07) (92% - 97%)    Parameters below as of 08 Jun 2023 12:07  Patient On (Oxygen Delivery Method): room air              06-07 @ 07:01  -  06-08 @ 07:00  --------------------------------------------------------  IN: 240 mL / OUT: 0 mL / NET: 240 mL          LABS:                        13.3   12.91 )-----------( 274      ( 08 Jun 2023 07:05 )             41.7     06-08    143  |  105  |  13  ----------------------------<  84  3.9   |  26  |  0.72    Ca    8.7      08 Jun 2023 07:04    TPro  5.9<L>  /  Alb  x   /  TBili  x   /  DBili  x   /  AST  x   /  ALT  x   /  AlkPhos  x   06-07          CAPILLARY BLOOD GLUCOSE        PT/INR - ( 08 Jun 2023 07:07 )   PT: 13.8 sec;   INR: 1.20 ratio         PTT - ( 08 Jun 2023 07:07 )  PTT:26.0 sec        ROBBIE -- 06-06 @ 07:09  Anti SS-1 --  Anti SS-2 --  Anti RNP --  RF -- 06-06 @ 07:09    Atypical ANCA Negative 06-06 @ 07:09  c-ANCA titer -- 06-06 @ 07:09  c-ANCA Negative 06-06 @ 07:09  p-ANCA Negative 06-06 @ 07:09              CULTURES:     Culture - Blood (collected 06-05-23 @ 06:53)  Source: .Blood Blood-Peripheral  Preliminary Report (06-06-23 @ 09:02):    No growth to date.    Culture - Blood (collected 06-05-23 @ 06:45)  Source: .Blood Blood-Peripheral  Preliminary Report (06-06-23 @ 09:02):    No growth to date.                Physical Examination:  PULM: Clear to auscultation bilaterally, no significant sputum production  CVS: S1, S2 heard    RADIOLOGY REVIEWED  CXR: grossly clear     CT chest:    < from: CT Angio Chest PE Protocol w/ IV Cont (06.05.23 @ 12:35) >  FINDINGS:    LUNGS AND AIRWAYS: Patent central airways.  Mild emphysema. There are   patchy right upper and lower lobe groundglass opacities and branching   nodular opacities in the right lower lobe. Triangular 5 x 11 mm   Perifissural nodule nodule in the left lower lobe likely a lymph node.  PLEURA: No pleural effusion.  MEDIASTINUM AND PAUL: No lymphadenopathy.  VESSELS: Nondiagnostic study for evaluation of pulmonary embolism. There   is inadequate contrast bolus in the pulmonary arteries large contrast   bolus remains in the SVC. There is normal caliber.  HEART: Heart size is normal. No pericardial effusion.  CHEST WALL AND LOWER NECK: Within normal limits.  VISUALIZED UPPER ABDOMEN: Within normal limits.  BONES: Within normal limits.    IMPRESSION:  Nondiagnostic study for pulmonary embolism evaluation.    There are airspace opacities in the right upper and lower lobe concerning   for pneumonia.      < end of copied text >

## 2023-06-09 ENCOUNTER — TRANSCRIPTION ENCOUNTER (OUTPATIENT)
Age: 63
End: 2023-06-09

## 2023-06-09 VITALS
SYSTOLIC BLOOD PRESSURE: 136 MMHG | TEMPERATURE: 98 F | HEART RATE: 86 BPM | RESPIRATION RATE: 18 BRPM | DIASTOLIC BLOOD PRESSURE: 81 MMHG | OXYGEN SATURATION: 92 %

## 2023-06-09 LAB
% ALBUMIN: 53.8 % — SIGNIFICANT CHANGE UP
% ALPHA 1: 6.1 % — SIGNIFICANT CHANGE UP
% ALPHA 2: 12.5 % — SIGNIFICANT CHANGE UP
% BETA: 13.6 % — SIGNIFICANT CHANGE UP
% GAMMA: 14 % — SIGNIFICANT CHANGE UP
ALBUMIN SERPL ELPH-MCNC: 3.2 G/DL — LOW (ref 3.6–5.5)
ALBUMIN/GLOB SERPL ELPH: 1.2 RATIO — SIGNIFICANT CHANGE UP
ALPHA1 GLOB SERPL ELPH-MCNC: 0.4 G/DL — SIGNIFICANT CHANGE UP (ref 0.1–0.4)
ALPHA2 GLOB SERPL ELPH-MCNC: 0.7 G/DL — SIGNIFICANT CHANGE UP (ref 0.5–1)
ANION GAP SERPL CALC-SCNC: 13 MMOL/L — SIGNIFICANT CHANGE UP (ref 5–17)
B-GLOBULIN SERPL ELPH-MCNC: 0.8 G/DL — SIGNIFICANT CHANGE UP (ref 0.5–1)
BUN SERPL-MCNC: 13 MG/DL — SIGNIFICANT CHANGE UP (ref 7–23)
CALCIUM SERPL-MCNC: 8.8 MG/DL — SIGNIFICANT CHANGE UP (ref 8.4–10.5)
CHLORIDE SERPL-SCNC: 104 MMOL/L — SIGNIFICANT CHANGE UP (ref 96–108)
CO2 SERPL-SCNC: 25 MMOL/L — SIGNIFICANT CHANGE UP (ref 22–31)
CREAT SERPL-MCNC: 0.75 MG/DL — SIGNIFICANT CHANGE UP (ref 0.5–1.3)
CREATININE, URINE RESULT: 79 MG/DL — SIGNIFICANT CHANGE UP
CULTURE RESULTS: SIGNIFICANT CHANGE UP
EGFR: 101 ML/MIN/1.73M2 — SIGNIFICANT CHANGE UP
GAMMA GLOBULIN: 0.8 G/DL — SIGNIFICANT CHANGE UP (ref 0.6–1.6)
GLUCOSE SERPL-MCNC: 89 MG/DL — SIGNIFICANT CHANGE UP (ref 70–99)
HCT VFR BLD CALC: 42.2 % — SIGNIFICANT CHANGE UP (ref 39–50)
HGB BLD-MCNC: 13.1 G/DL — SIGNIFICANT CHANGE UP (ref 13–17)
INR BLD: 1.03 RATIO — SIGNIFICANT CHANGE UP (ref 0.88–1.16)
INTERPRETATION SERPL IFE-IMP: SIGNIFICANT CHANGE UP
MCHC RBC-ENTMCNC: 29.9 PG — SIGNIFICANT CHANGE UP (ref 27–34)
MCHC RBC-ENTMCNC: 31 GM/DL — LOW (ref 32–36)
MCV RBC AUTO: 96.3 FL — SIGNIFICANT CHANGE UP (ref 80–100)
NRBC # BLD: 0 /100 WBCS — SIGNIFICANT CHANGE UP (ref 0–0)
PLATELET # BLD AUTO: 293 K/UL — SIGNIFICANT CHANGE UP (ref 150–400)
POTASSIUM SERPL-MCNC: 4.1 MMOL/L — SIGNIFICANT CHANGE UP (ref 3.5–5.3)
POTASSIUM SERPL-SCNC: 4.1 MMOL/L — SIGNIFICANT CHANGE UP (ref 3.5–5.3)
PROT ?TM UR-MCNC: 13 MG/DL — HIGH (ref 0–12)
PROT ?TM UR-MCNC: 13 MG/DL — HIGH (ref 0–12)
PROT PATTERN SERPL ELPH-IMP: SIGNIFICANT CHANGE UP
PROTHROM AB SERPL-ACNC: 12 SEC — SIGNIFICANT CHANGE UP (ref 10.5–13.4)
RBC # BLD: 4.38 M/UL — SIGNIFICANT CHANGE UP (ref 4.2–5.8)
RBC # FLD: 14.5 % — SIGNIFICANT CHANGE UP (ref 10.3–14.5)
SODIUM SERPL-SCNC: 142 MMOL/L — SIGNIFICANT CHANGE UP (ref 135–145)
SPECIMEN SOURCE: SIGNIFICANT CHANGE UP
WBC # BLD: 12.78 K/UL — HIGH (ref 3.8–10.5)
WBC # FLD AUTO: 12.78 K/UL — HIGH (ref 3.8–10.5)

## 2023-06-09 PROCEDURE — 80048 BASIC METABOLIC PNL TOTAL CA: CPT

## 2023-06-09 PROCEDURE — 87070 CULTURE OTHR SPECIMN AEROBIC: CPT

## 2023-06-09 PROCEDURE — 84484 ASSAY OF TROPONIN QUANT: CPT

## 2023-06-09 PROCEDURE — 84165 PROTEIN E-PHORESIS SERUM: CPT

## 2023-06-09 PROCEDURE — 85014 HEMATOCRIT: CPT

## 2023-06-09 PROCEDURE — 97161 PT EVAL LOW COMPLEX 20 MIN: CPT

## 2023-06-09 PROCEDURE — 84145 PROCALCITONIN (PCT): CPT

## 2023-06-09 PROCEDURE — 36415 COLL VENOUS BLD VENIPUNCTURE: CPT

## 2023-06-09 PROCEDURE — 85025 COMPLETE CBC W/AUTO DIFF WBC: CPT

## 2023-06-09 PROCEDURE — 88305 TISSUE EXAM BY PATHOLOGIST: CPT

## 2023-06-09 PROCEDURE — 85027 COMPLETE CBC AUTOMATED: CPT

## 2023-06-09 PROCEDURE — 83605 ASSAY OF LACTIC ACID: CPT

## 2023-06-09 PROCEDURE — 83880 ASSAY OF NATRIURETIC PEPTIDE: CPT

## 2023-06-09 PROCEDURE — 86334 IMMUNOFIX E-PHORESIS SERUM: CPT

## 2023-06-09 PROCEDURE — 84295 ASSAY OF SERUM SODIUM: CPT

## 2023-06-09 PROCEDURE — 87206 SMEAR FLUORESCENT/ACID STAI: CPT

## 2023-06-09 PROCEDURE — 87015 SPECIMEN INFECT AGNT CONCNTJ: CPT

## 2023-06-09 PROCEDURE — 88312 SPECIAL STAINS GROUP 1: CPT

## 2023-06-09 PROCEDURE — 86036 ANCA SCREEN EACH ANTIBODY: CPT

## 2023-06-09 PROCEDURE — 93970 EXTREMITY STUDY: CPT

## 2023-06-09 PROCEDURE — 88112 CYTOPATH CELL ENHANCE TECH: CPT

## 2023-06-09 PROCEDURE — 85610 PROTHROMBIN TIME: CPT

## 2023-06-09 PROCEDURE — 84166 PROTEIN E-PHORESIS/URINE/CSF: CPT

## 2023-06-09 PROCEDURE — 82550 ASSAY OF CK (CPK): CPT

## 2023-06-09 PROCEDURE — 87116 MYCOBACTERIA CULTURE: CPT

## 2023-06-09 PROCEDURE — 96374 THER/PROPH/DIAG INJ IV PUSH: CPT

## 2023-06-09 PROCEDURE — 87449 NOS EACH ORGANISM AG IA: CPT

## 2023-06-09 PROCEDURE — 89051 BODY FLUID CELL COUNT: CPT

## 2023-06-09 PROCEDURE — 82330 ASSAY OF CALCIUM: CPT

## 2023-06-09 PROCEDURE — 99285 EMERGENCY DEPT VISIT HI MDM: CPT | Mod: 25

## 2023-06-09 PROCEDURE — 87594 PNEUMCYSTS JIROVECII AMP PRB: CPT

## 2023-06-09 PROCEDURE — 76000 FLUOROSCOPY <1 HR PHYS/QHP: CPT

## 2023-06-09 PROCEDURE — 94640 AIRWAY INHALATION TREATMENT: CPT

## 2023-06-09 PROCEDURE — 93308 TTE F-UP OR LMTD: CPT

## 2023-06-09 PROCEDURE — 84155 ASSAY OF PROTEIN SERUM: CPT

## 2023-06-09 PROCEDURE — 87040 BLOOD CULTURE FOR BACTERIA: CPT

## 2023-06-09 PROCEDURE — 0225U NFCT DS DNA&RNA 21 SARSCOV2: CPT

## 2023-06-09 PROCEDURE — 83735 ASSAY OF MAGNESIUM: CPT

## 2023-06-09 PROCEDURE — 87640 STAPH A DNA AMP PROBE: CPT

## 2023-06-09 PROCEDURE — 82784 ASSAY IGA/IGD/IGG/IGM EACH: CPT

## 2023-06-09 PROCEDURE — 87389 HIV-1 AG W/HIV-1&-2 AB AG IA: CPT

## 2023-06-09 PROCEDURE — 71045 X-RAY EXAM CHEST 1 VIEW: CPT

## 2023-06-09 PROCEDURE — 99239 HOSP IP/OBS DSCHRG MGMT >30: CPT

## 2023-06-09 PROCEDURE — 82553 CREATINE MB FRACTION: CPT

## 2023-06-09 PROCEDURE — 86901 BLOOD TYPING SEROLOGIC RH(D): CPT

## 2023-06-09 PROCEDURE — 84100 ASSAY OF PHOSPHORUS: CPT

## 2023-06-09 PROCEDURE — 81003 URINALYSIS AUTO W/O SCOPE: CPT

## 2023-06-09 PROCEDURE — 87641 MR-STAPH DNA AMP PROBE: CPT

## 2023-06-09 PROCEDURE — 87102 FUNGUS ISOLATION CULTURE: CPT

## 2023-06-09 PROCEDURE — 86850 RBC ANTIBODY SCREEN: CPT

## 2023-06-09 PROCEDURE — 86140 C-REACTIVE PROTEIN: CPT

## 2023-06-09 PROCEDURE — 85018 HEMOGLOBIN: CPT

## 2023-06-09 PROCEDURE — 71275 CT ANGIOGRAPHY CHEST: CPT | Mod: MA

## 2023-06-09 PROCEDURE — 94660 CPAP INITIATION&MGMT: CPT

## 2023-06-09 PROCEDURE — 84132 ASSAY OF SERUM POTASSIUM: CPT

## 2023-06-09 PROCEDURE — 96375 TX/PRO/DX INJ NEW DRUG ADDON: CPT

## 2023-06-09 PROCEDURE — 85730 THROMBOPLASTIN TIME PARTIAL: CPT

## 2023-06-09 PROCEDURE — C9399: CPT

## 2023-06-09 PROCEDURE — 82803 BLOOD GASES ANY COMBINATION: CPT

## 2023-06-09 PROCEDURE — 80053 COMPREHEN METABOLIC PANEL: CPT

## 2023-06-09 PROCEDURE — 86900 BLOOD TYPING SEROLOGIC ABO: CPT

## 2023-06-09 PROCEDURE — 82435 ASSAY OF BLOOD CHLORIDE: CPT

## 2023-06-09 PROCEDURE — 82947 ASSAY GLUCOSE BLOOD QUANT: CPT

## 2023-06-09 RX ORDER — CEFUROXIME AXETIL 250 MG
1 TABLET ORAL
Qty: 4 | Refills: 0
Start: 2023-06-09 | End: 2023-06-10

## 2023-06-09 RX ORDER — WARFARIN SODIUM 2.5 MG/1
1 TABLET ORAL
Refills: 0 | DISCHARGE

## 2023-06-09 RX ORDER — RIVAROXABAN 15 MG-20MG
1 KIT ORAL
Qty: 30 | Refills: 0
Start: 2023-06-09 | End: 2023-07-08

## 2023-06-09 RX ADMIN — LIDOCAINE 1 PATCH: 4 CREAM TOPICAL at 07:44

## 2023-06-09 RX ADMIN — PANTOPRAZOLE SODIUM 40 MILLIGRAM(S): 20 TABLET, DELAYED RELEASE ORAL at 05:12

## 2023-06-09 RX ADMIN — LIDOCAINE 1 PATCH: 4 CREAM TOPICAL at 11:45

## 2023-06-09 RX ADMIN — BUDESONIDE AND FORMOTEROL FUMARATE DIHYDRATE 2 PUFF(S): 160; 4.5 AEROSOL RESPIRATORY (INHALATION) at 05:12

## 2023-06-09 RX ADMIN — TIOTROPIUM BROMIDE 2 PUFF(S): 18 CAPSULE ORAL; RESPIRATORY (INHALATION) at 11:31

## 2023-06-09 RX ADMIN — Medication 40 MILLIGRAM(S): at 05:12

## 2023-06-09 RX ADMIN — LIDOCAINE 1 PATCH: 4 CREAM TOPICAL at 10:43

## 2023-06-09 NOTE — PROGRESS NOTE ADULT - PROBLEM SELECTOR PLAN 2
2nd to PNA  -S/p Solumedrol 125 mg IVP x1 in ED  -Continue Prednisone 40 mg PO qd for now, will re-evaluate further taper.   -Symbicort 160mcg/4.5mcg 2 puffs BID (home med: Trelegy)  -Spiriva 2 puffs qd   -Duoneb q6h  -Keep sats >90% with o2 PRN (on home o2 2-3LNC PRN only).
acute bacterial pneumonia.  patient with recurrent pneumonias-unclear why  appreciate pulm c/s  agree with azithro/ceftriaxone (6/5-)  check MRSA nasal PCR, sputum, culture, blood culture, strep Ag, urine legionella  check ANCA and Immunoglobulins for recurrent PNA infections.  Anatomical region appears different this CT from prior  d/w Dr. Guerin, he is planning for bronch given frequent PNAs. Thinking patient will need 1 week IV abx. Protein electrophoresis also sent  Planned for bronch 6/8- NPO MN, lovenox held
acute bacterial pneumonia.  patient with recurrent pneumonias-unclear why  appreciate pulm c/s  agree with azithro/ceftriaxone (6/5-)  check MRSA nasal PCR, sputum, culture, blood culture, strep Ag, urine legionella  check ANCA and Immunoglobulins for recurrent PNA infections.  Anatomical region appears different this CT from prior  d/w Dr. Guerin, he is planning for bronch given frequent PNAs. Thinking patient will need 1 week IV abx. Protein electrophoresis also sent
acute bacterial pneumonia.  patient with recurrent pneumonias-unclear why  appreciate pulm c/s  legionella neg  sputum cx with some gram + cocci and rods  s/p bronch - f/u BAL. post procedure CXR reviewed, no pneumo   ANCA neg  Serum electrophoresis neg  d/w pulm- planned for 7 day course of abx. Can transition to PO on dc today for total of 7 days
2nd to PNA  -S/p Solumedrol 125 mg IVP x1 in ED  -Continue Prednisone 40 mg PO qd for now, will re-evaluate further taper.   -Symbicort 160mcg/4.5mcg 2 puffs BID (home med: Trelegy)  -Spiriva 2 puffs qd   -Duoneb q6h PRN   -Keep sats >90% with o2 PRN (on home o2 2-3LNC PRN only).
2nd to PNA  -S/p Solumedrol 125 mg IVP x1 in ED  -Steroids as above   -Symbicort 160mcg/4.5mcg 2 puffs BID (home med: Trelegy)  -Spiriva 2 puffs qd   -Duoneb q6h PRN   -Keep sats >90% with o2 PRN (on home o2 2-3LNC PRN only).
acute bacterial pneumonia.  patient with recurrent pneumonias-unclear why  appreciate pulm c/s  legionella neg  sputum cx with some gram + cocci and rods  s/p bronch - f/u BAL. post procedure CXR reviewed, no pneumo   ANCA neg  Serum electrophoresis neg  d/w pulm- planned for 7 day course of abx. Can transition to PO on dc - today is day 4

## 2023-06-09 NOTE — PROGRESS NOTE ADULT - SUBJECTIVE AND OBJECTIVE BOX
[facility] Division of Hospital Medicine  Teresa Marcus DO   Available via [employer] Teams      Patient is a 63y old  Male who presents with a chief complaint of hypoxia (08 Jun 2023 15:01)      SUBJECTIVE / OVERNIGHT EVENTS:  Afebrile, no SOB or CP     MEDICATIONS  (STANDING):  atorvastatin 20 milliGRAM(s) Oral at bedtime  budesonide 160 MICROgram(s)/formoterol 4.5 MICROgram(s) Inhaler 2 Puff(s) Inhalation two times a day  cefTRIAXone   IVPB 1000 milliGRAM(s) IV Intermittent every 24 hours  lidocaine   4% Patch 1 Patch Transdermal daily  pantoprazole    Tablet 40 milliGRAM(s) Oral before breakfast  predniSONE   Tablet 40 milliGRAM(s) Oral daily  rivaroxaban 20 milliGRAM(s) Oral with dinner  tiotropium 2.5 MICROgram(s) Inhaler 2 Puff(s) Inhalation daily    MEDICATIONS  (PRN):  acetaminophen     Tablet .. 650 milliGRAM(s) Oral every 6 hours PRN Temp greater or equal to 38C (100.4F), Mild Pain (1 - 3)  albuterol/ipratropium for Nebulization 3 milliLiter(s) Nebulizer every 6 hours PRN Shortness of Breath and/or Wheezing      CAPILLARY BLOOD GLUCOSE        I&O's Summary    08 Jun 2023 07:01  -  09 Jun 2023 07:00  --------------------------------------------------------  IN: 970 mL / OUT: 1 mL / NET: 969 mL    09 Jun 2023 07:01  -  09 Jun 2023 11:32  --------------------------------------------------------  IN: 240 mL / OUT: 0 mL / NET: 240 mL        PHYSICAL EXAM:  Vital Signs Last 24 Hrs  T(C): 36.5 (09 Jun 2023 04:31), Max: 36.7 (08 Jun 2023 21:00)  T(F): 97.7 (09 Jun 2023 04:31), Max: 98 (08 Jun 2023 21:00)  HR: 77 (09 Jun 2023 05:32) (76 - 85)  BP: 126/82 (09 Jun 2023 04:31) (126/82 - 146/73)  BP(mean): --  RR: 18 (09 Jun 2023 04:31) (18 - 18)  SpO2: 92% (09 Jun 2023 05:32) (91% - 97%)    Parameters below as of 09 Jun 2023 04:31  Patient On (Oxygen Delivery Method): nasal cannula      CONSTITUTIONAL: NAD, well-developed, well-groomed   EYES: conjunctiva and sclera clear  ENMT: Moist oral mucosa, no pharyngeal injection or exudates  RESPIRATORY: Normal respiratory effort; lungs are clear to auscultation bilaterally  CARDIOVASCULAR: Regular rate and rhythm, normal S1 and S2; No lower extremity edema  ABDOMEN: Nontender to palpation, normoactive bowel sounds, no rebound/guarding  MUSCULOSKELETAL: no clubbing or cyanosis of digits; no joint swelling or tenderness to palpation  PSYCH: A+O to person, place, and time; affect appropriate  NEUROLOGY: CN 2-12 are intact and symmetric; no gross sensory deficits   SKIN: No rashes; no palpable lesions    LABS:                        13.1   12.78 )-----------( 293      ( 09 Jun 2023 06:58 )             42.2     06-09    142  |  104  |  13  ----------------------------<  89  4.1   |  25  |  0.75    Ca    8.8      09 Jun 2023 06:59      PT/INR - ( 09 Jun 2023 06:58 )   PT: 12.0 sec;   INR: 1.03 ratio         PTT - ( 08 Jun 2023 07:07 )  PTT:26.0 sec          Culture - Fungal, Bronchial (collected 08 Jun 2023 13:35)  Source: .Bronchial Bronchial Lavage  Preliminary Report (09 Jun 2023 07:54):    Testing in progress    Culture - Bronchial (collected 08 Jun 2023 13:35)  Source: .Bronchial Bronchial  Gram Stain (08 Jun 2023 22:28):    Few polymorphonuclear leukocytes per low power field    No Squamous epithelial cells per low power field    No organisms seen per oil power field    Culture - Acid Fast - Bronchial w/Smear (collected 08 Jun 2023 13:35)  Source: .Bronchial Bronchial Lavage    Culture - Sputum (collected 07 Jun 2023 18:01)  Source: .Sputum Sputum  Gram Stain (08 Jun 2023 06:15):    Few polymorphonuclear leukocytes per low power field    No Squamous epithelial cells per low power field    Moderate Gram positive cocci in pairs per oil power field    Few Gram Positive Rods per oil power field  Preliminary Report (08 Jun 2023 20:02):    Normal Respiratory Laurie present        RADIOLOGY & ADDITIONAL TESTS:  Results Reviewed:   Imaging Personally Reviewed:  Electrocardiogram Personally Reviewed:    COORDINATION OF CARE:  Care Discussed with Consultants/Other Providers [Y/N]:  Prior or Outpatient Records Reviewed [Y/N]:

## 2023-06-09 NOTE — PROGRESS NOTE ADULT - PROBLEM SELECTOR PLAN 5
tylenol prn pain  hold meloxicam given AC  lido patch
-Suggest outpatient PSG
tylenol prn pain  hold meloxicam given AC  lido patch

## 2023-06-09 NOTE — PROGRESS NOTE ADULT - ASSESSMENT
62 y/o M well known to our service (office pt) with PMH of prostate CA (recent dx, plans to start radiation), DVTs in the past was initially on Xarelto and is now currently on Coumadin (+APLS) - INR levels have been difficult to control, recent PNA about 1 month ago (s/p Ceftin) - with multiple episodes of PNA over the past year, recent dx PE. Presents with fever, SOB and hypoxia. CTA chest non diagnostic for PE, + R sided PNA. 
64 yo M pmh COPD (on intermittent home O2 2L prn-uses about 1x monthly), DVT/PE on coumadin (transitioned from xarelto 6 weeks ago, recently SUPRATHERAPEUTIC with doses held until day PTA), prostate cancer planning for radiation, Right knee OA, s/p L knee replacement 2014, multiple recurrent pneumonias in past 6 months presents 6/5 with acute hypoxic respiratory failure due to suspected acute bacterial pneumonia improving with abx in ED.
62yo M pmh COPD (on intermittent home O2 2L prn-uses about 1x monthly), DVT/PE on coumadin (transitioned from xarelto 6 weeks ago, recently SUPRATHERAPEUTIC with doses held until day PTA), prostate cancer planning for radiation, Right knee OA, s/p L knee replacement 2014, multiple recurrent pneumonias in past 6 months presents 6/5 with acute hypoxic respiratory failure due to suspected acute bacterial pneumonia improving with abx in ED.
62 y/o M well known to our service (office pt) with PMH of prostate CA (recent dx, plans to start radiation), DVTs in the past was initially on Xarelto and is now currently on Coumadin (+APLS) - INR levels have been difficult to control, recent PNA about 1 month ago (s/p Ceftin) - with multiple episodes of PNA over the past year, recent dx PE. Presents with fever, SOB and hypoxia. CTA chest non diagnostic for PE, + R sided PNA. 
62 y/o M well known to our service (office pt) with PMH of prostate CA (recent dx, plans to start radiation), DVTs in the past was initially on Xarelto and is now currently on Coumadin (+APLS) - INR levels have been difficult to control, recent PNA about 1 month ago (s/p Ceftin) - with multiple episodes of PNA over the past year, recent dx PE. Presents with fever, SOB and hypoxia. CTA chest non diagnostic for PE, + R sided PNA. 
62yo M pmh COPD (on intermittent home O2 2L prn-uses about 1x monthly), DVT/PE on coumadin (transitioned from xarelto 6 weeks ago, recently SUPRATHERAPEUTIC with doses held until day PTA), prostate cancer planning for radiation, Right knee OA, s/p L knee replacement 2014, multiple recurrent pneumonias in past 6 months presents 6/5 with acute hypoxic respiratory failure due to suspected acute bacterial pneumonia improving with abx in ED.
64yo M pmh COPD (on intermittent home O2 2L prn-uses about 1x monthly), DVT/PE on coumadin (transitioned from xarelto 6 weeks ago, recently SUPRATHERAPEUTIC with doses held until day PTA), prostate cancer planning for radiation, Right knee OA, s/p L knee replacement 2014, multiple recurrent pneumonias in past 6 months presents 6/5 with acute hypoxic respiratory failure due to suspected acute bacterial pneumonia improving with abx in ED.

## 2023-06-09 NOTE — PROGRESS NOTE ADULT - SUBJECTIVE AND OBJECTIVE BOX
Follow-up Pulm Progress Note    feeling well  denies SOB/CP     Medications:  MEDICATIONS  (STANDING):  atorvastatin 20 milliGRAM(s) Oral at bedtime  budesonide 160 MICROgram(s)/formoterol 4.5 MICROgram(s) Inhaler 2 Puff(s) Inhalation two times a day  cefTRIAXone   IVPB 1000 milliGRAM(s) IV Intermittent every 24 hours  lidocaine   4% Patch 1 Patch Transdermal daily  pantoprazole    Tablet 40 milliGRAM(s) Oral before breakfast  predniSONE   Tablet 40 milliGRAM(s) Oral daily  rivaroxaban 20 milliGRAM(s) Oral with dinner  tiotropium 2.5 MICROgram(s) Inhaler 2 Puff(s) Inhalation daily    MEDICATIONS  (PRN):  acetaminophen     Tablet .. 650 milliGRAM(s) Oral every 6 hours PRN Temp greater or equal to 38C (100.4F), Mild Pain (1 - 3)  albuterol/ipratropium for Nebulization 3 milliLiter(s) Nebulizer every 6 hours PRN Shortness of Breath and/or Wheezing          Vital Signs Last 24 Hrs  T(C): 36.5 (09 Jun 2023 04:31), Max: 36.7 (08 Jun 2023 21:00)  T(F): 97.7 (09 Jun 2023 04:31), Max: 98 (08 Jun 2023 21:00)  HR: 77 (09 Jun 2023 05:32) (76 - 80)  BP: 126/82 (09 Jun 2023 04:31) (126/82 - 146/73)  BP(mean): --  RR: 18 (09 Jun 2023 04:31) (18 - 18)  SpO2: 92% (09 Jun 2023 05:32) (91% - 95%)    Parameters below as of 09 Jun 2023 04:31  Patient On (Oxygen Delivery Method): nasal cannula              06-08 @ 07:01  -  06-09 @ 07:00  --------------------------------------------------------  IN: 970 mL / OUT: 1 mL / NET: 969 mL          LABS:                        13.1   12.78 )-----------( 293      ( 09 Jun 2023 06:58 )             42.2     06-09    142  |  104  |  13  ----------------------------<  89  4.1   |  25  |  0.75    Ca    8.8      09 Jun 2023 06:59            CAPILLARY BLOOD GLUCOSE        PT/INR - ( 09 Jun 2023 06:58 )   PT: 12.0 sec;   INR: 1.03 ratio         PTT - ( 08 Jun 2023 07:07 )  PTT:26.0 sec        ROBBIE -- 06-06 @ 07:09  Anti SS-1 --  Anti SS-2 --  Anti RNP --  RF -- 06-06 @ 07:09    Atypical ANCA Negative 06-06 @ 07:09  c-ANCA titer -- 06-06 @ 07:09  c-ANCA Negative 06-06 @ 07:09  p-ANCA Negative 06-06 @ 07:09          Fluid characteristics  -- 06-08 @ 13:34  pH --  LDH --  tprot --    Cell count  Appearance Cloudy  Fluid type --  BF lymph 3  color Straw  eosinophil --  PMN --  Mesothelial 2  Monocyte 9  Other body cells --      CULTURES:    Culture - Blood (collected 06-05-23 @ 06:53)  Source: .Blood Blood-Peripheral  Preliminary Report (06-06-23 @ 09:02):    No growth to date.    Culture - Blood (collected 06-05-23 @ 06:45)  Source: .Blood Blood-Peripheral  Preliminary Report (06-06-23 @ 09:02):    No growth to date.            Culture - Acid Fast - Bronchial w/Smear (collected 06-08-23 @ 13:35)  Source: .Bronchial Bronchial Lavage              Culture - Bronchial (collected 06-08-23 @ 13:35)  Source: .Bronchial Bronchial  Gram Stain (06-08-23 @ 22:28):    Few polymorphonuclear leukocytes per low power field    No Squamous epithelial cells per low power field    No organisms seen per oil power field          Culture - Fungal, Bronchial (collected 06-08-23 @ 13:35)  Source: .Bronchial Bronchial Lavage  Preliminary Report (06-09-23 @ 07:54):    Testing in progress            Physical Examination:  PULM: Clear to auscultation bilaterally, no significant sputum production  CVS: S1, S2 heard    RADIOLOGY REVIEWED  CXR: grossly clear     CT chest:    < from: CT Angio Chest PE Protocol w/ IV Cont (06.05.23 @ 12:35) >  FINDINGS:    LUNGS AND AIRWAYS: Patent central airways.  Mild emphysema. There are   patchy right upper and lower lobe groundglass opacities and branching   nodular opacities in the right lower lobe. Triangular 5 x 11 mm   Perifissural nodule nodule in the left lower lobe likely a lymph node.  PLEURA: No pleural effusion.  MEDIASTINUM AND PAUL: No lymphadenopathy.  VESSELS: Nondiagnostic study for evaluation of pulmonary embolism. There   is inadequate contrast bolus in the pulmonary arteries large contrast   bolus remains in the SVC. There is normal caliber.  HEART: Heart size is normal. No pericardial effusion.  CHEST WALL AND LOWER NECK: Within normal limits.  VISUALIZED UPPER ABDOMEN: Within normal limits.  BONES: Within normal limits.    IMPRESSION:  Nondiagnostic study for pulmonary embolism evaluation.    There are airspace opacities in the right upper and lower lobe concerning   for pneumonia.      < end of copied text >

## 2023-06-09 NOTE — PROGRESS NOTE ADULT - PROBLEM SELECTOR PLAN 3
CTA chest 5/11/23 with PE RML and RLL segmental branches  -AC per hematology. Can resume AC 24 hrs after bronch.
CTA chest 5/11/23 with PE RML and RLL segmental branches  -AC per hematology. On hold for now for bronch.
appreciate pulm c/s  steroids inhaled/systemic per pulm  Will be on steroid taper on dc - 2 more days of pred 40 and then 30mg until outpatient f/u
appreciate pulm c/s  steroids inhaled/systemic per pulm.  As improved after initial ED treatment cw oral prednisone   abx as above
appreciate pulm c/s  steroids inhaled/systemic per pulm.  As improved after initial ED treatment cw oral prednisone   abx as above
CTA chest 5/11/23 with PE RML and RLL segmental branches  -AC per hematology.
appreciate pulm c/s  steroids inhaled/systemic per pulm  Will be on steroid taper on dc

## 2023-06-09 NOTE — DISCHARGE NOTE NURSING/CASE MANAGEMENT/SOCIAL WORK - PATIENT PORTAL LINK FT
You can access the FollowMyHealth Patient Portal offered by [facility] by registering at the following website: [email or web address]. By joining [employer]’s FollowMyHealth portal, you will also be able to view your health information using other applications (apps) compatible with our system.

## 2023-06-09 NOTE — PROGRESS NOTE ADULT - PROBLEM SELECTOR PROBLEM 4
DVT (deep venous thrombosis)
Venous thromboembolism (VTE)
Venous thromboembolism (VTE)
DVT (deep venous thrombosis)
Venous thromboembolism (VTE)
DVT (deep venous thrombosis)
Venous thromboembolism (VTE)

## 2023-06-09 NOTE — PROGRESS NOTE ADULT - PROBLEM SELECTOR PROBLEM 3
Pulmonary embolism
COPD with pneumonia
COPD with pneumonia
Pulmonary embolism
Pulmonary embolism
COPD with pneumonia
COPD with pneumonia

## 2023-06-09 NOTE — PROGRESS NOTE ADULT - PROBLEM SELECTOR PLAN 1
febrile, tachy, wbc on admission.  Not severe as no end organ damage and lactate 1.6  Nasal canula 2L to maintain saturation>88%    unlikely cardiac as BNP <36, trop 7 and no overload on CXR/CT

## 2023-06-09 NOTE — PROGRESS NOTE ADULT - PROBLEM SELECTOR PLAN 6
I updated wife who is an RN Brianda @ [phone] while at bedside
Berenice     Stable for dc home today with outpatient pulm f/u  Time spent on dc: 40 min
PT c/s  I updated wife who is an RN Brianda @ [phone] while at bedside
PT c/s  I updated wife who is an RN Brianda @ [phone] while at bedside

## 2023-06-09 NOTE — PROGRESS NOTE ADULT - NS ATTEND AMEND GEN_ALL_CORE FT
Comfortable on room air   Plan for bronchoscopy in AM
Comfortable on room air   S/p bronch   Follow up bronch and biopsy results  Can complete total of 7 days antibiotics, ok to discharge on oral antibiotics  Tapering steroids as above  Outpatient pulmonary follow up upon discharge
Comfortable on room air   S/p bronch   Follow up bronch and biopsy results  Can complete total of 7 days antibiotics, ok to discharge on oral antibiotics  Tapering steroids as above  Outpatient pulmonary follow up upon discharge  Out patient PSG studies, patient has been told to get tested but states he refused previously

## 2023-06-09 NOTE — PROGRESS NOTE ADULT - PROBLEM SELECTOR PLAN 1
CTA chest with R sided PNA  -Recent L sided PNA in May  -Multiple episodes of PNA over the past year. F/u immunoglobulin panel. F/u protein electrophoresis   -Urine legionella negative  -MRSA/MSSA nasal PCR negative   -Trend leukocytosis, fever curve. Uptrending, likely 2nd to steroids  -? organizing PNA. Suggest continue steroids. Prednisone 40mg PO qd x 7 days then decreased until 30mg PO qd until f/u in office.   -PPI while on steroids   -S/p Bronch with biopsy today. F/u bronch studies, path   -Ceftin 500mg PO BID to complete 7 days total antibiotics on discharge  -F/u in office, appt made for pt 6/26 @1300

## 2023-06-09 NOTE — DISCHARGE NOTE NURSING/CASE MANAGEMENT/SOCIAL WORK - NSDCFUADDAPPT_GEN_ALL_CORE_FT
APPTS ARE READY TO BE MADE: [ X] YES    Best Family or Patient Contact (if needed):    Additional Information about above appointments (if needed):    1: Dr. Guerin  2:   3:     Other comments or requests:

## 2023-06-09 NOTE — DISCHARGE NOTE NURSING/CASE MANAGEMENT/SOCIAL WORK - NSDCPEFALRISK_GEN_ALL_CORE
For information on Fall & Injury Prevention, visit: [email or web address] OR  [email or web address] OR  https://www.cdc.gov/steadi/patient.html

## 2023-06-09 NOTE — PROGRESS NOTE ADULT - PROBLEM SELECTOR PLAN 4
LE duplex 6/5 with considerable resolution in the DVT that was present on 4/6. Now with postphlebitic changes.  -AC per hematology
has known recent VTE.  Per wife, he was treated on xarelto for years given h/o LA that was positive, then negative.  Initial clot was after knee surgery 2014.  He stopped xarelto ~November 2022 and within 3 months had developed dvt.  Was resumed on xarelto but then had PE and was transitioned to Coumadin.  However this was done via an outpatient xarelto coumadin bridge and he was not therapeutic for several weeks where he was maintained on both drugs.  He then was supratherapeutic on coumadin with INR>4 and coumadin was held until night prior to admission.  Heme consulted on admission - f/u recs. Outpt heme is Dr. Doran [phone]. Attempted to call office but on hold for a long time  c/w lovenox for now, wife would be amenable to therapeutic lovenox in place of coumadin (she is an RN and can administer). Will hold coumadin today given plan for bronch
has known recent VTE.  Per wife, he was treated on xarelto for years given h/o LA that was positive, then negative.  Initial clot was after knee surgery 2014.  He stopped xarelto ~November 2022 and within 3 months had developed dvt.  Was resumed on xarelto but then had PE and was transitioned to Coumadin.  However this was done via an outpatient xarelto coumadin bridge and he was not therapeutic for several weeks where he was maintained on both drugs.  He then was supratherapeutic on coumadin with INR>4 and coumadin was held until night prior to admission.  Heme c/s appreciated, can be on xarelto. To resume AC tonight
LE duplex 6/5 with considerable resolution in the DVT that was present on 4/6. Now with postphlebitic changes.  -AC per hematology. Can resume tomorrow.
LE duplex 6/5 with considerable resolution in the DVT that was present on 4/6. Now with postphlebitic changes.  -AC per hematology. On hold for now for bronch.
has known recent VTE.  Per wife, he was treated on xarelto for years given h/o LA that was positive, then negative.  Initial clot was after knee surgery 2014.  He stopped xarelto ~November 2022 and within 3 months had developed dvt.  Was resumed on xarelto but then had PE and was transitioned to Coumadin.  However this was done via an outpatient xarelto coumadin bridge and he was not therapeutic for several weeks where he was maintained on both drugs.  He then was supratherapeutic on coumadin with INR>4 and coumadin was held until night prior to admission.  Heme consulted on admission - f/u recs. Outpt heme is Dr. Doran [phone]. Attempted to call office but on hold for a long time  c/w lovenox for now, wife would be amenable to therapeutic lovenox in place of coumadin (she is an RN and can administer). Will hold coumadin, lovenox also on hold for bronch
has known recent VTE.  Per wife, he was treated on xarelto for years given h/o LA that was positive, then negative.  Initial clot was after knee surgery 2014.  He stopped xarelto ~November 2022 and within 3 months had developed dvt.  Was resumed on xarelto but then had PE and was transitioned to Coumadin.  However this was done via an outpatient xarelto coumadin bridge and he was not therapeutic for several weeks where he was maintained on both drugs.  He then was supratherapeutic on coumadin with INR>4 and coumadin was held until night prior to admission.  Heme c/s appreciated, can be on xarelto. To resume AC 24 hours post bronch

## 2023-06-10 LAB
CULTURE RESULTS: SIGNIFICANT CHANGE UP
FUNGITELL B-D-GLUCAN,  BRONCHIAL LAVAGE: SIGNIFICANT CHANGE UP
SPECIMEN SOURCE: SIGNIFICANT CHANGE UP

## 2023-06-11 LAB
% GAMMA, URINE: 4.8 % — SIGNIFICANT CHANGE UP
ALBUMIN 24H MFR UR ELPH: 19.3 % — SIGNIFICANT CHANGE UP
ALPHA1 GLOB 24H MFR UR ELPH: 45.8 % — SIGNIFICANT CHANGE UP
ALPHA2 GLOB 24H MFR UR ELPH: 16.5 % — SIGNIFICANT CHANGE UP
B-GLOBULIN 24H MFR UR ELPH: 13.6 % — SIGNIFICANT CHANGE UP
COLLECT DURATION TIME UR: 24 HR — SIGNIFICANT CHANGE UP
INTERPRETATION 24H UR IFE-IMP: SIGNIFICANT CHANGE UP
INTERPRETATION 24H UR IFE-IMP: SIGNIFICANT CHANGE UP
M PROTEIN 24H UR ELPH-MRATE: 0 % — SIGNIFICANT CHANGE UP
M PROTEIN 24H UR ELPH-MRATE: 0 MG/24HR — SIGNIFICANT CHANGE UP (ref 0–0)
M PROTEIN 24H UR ELPH-MRATE: 0 MG/DL — SIGNIFICANT CHANGE UP
P JIROVECII DNA L RESP QL NAA+NON-PROBE: POSITIVE
PROT PATTERN 24H UR ELPH-IMP: SIGNIFICANT CHANGE UP
PROTEIN QUANT CALC, URINE: 318 MG/24 H — HIGH (ref 50–100)
SPECIMEN SOURCE: SIGNIFICANT CHANGE UP
TOTAL VOLUME - 24 HOUR: 2450 ML — SIGNIFICANT CHANGE UP
URINE CREATININE CALCULATION: 1.9 G/24 H — SIGNIFICANT CHANGE UP (ref 1–2)

## 2023-06-12 NOTE — CHART NOTE - NSCHARTNOTEFT_GEN_A_CORE
Received notification that BAL returned positive for Pneumocystis. Alerted outpatient pulmonary Dr. Guerin and patient/wife. Dr. Guerin confirmed he will follow up with patient.

## 2023-06-14 LAB — NON-GYNECOLOGICAL CYTOLOGY STUDY: SIGNIFICANT CHANGE UP

## 2023-06-20 PROBLEM — C61 MALIGNANT NEOPLASM OF PROSTATE: Chronic | Status: ACTIVE | Noted: 2023-06-05

## 2023-06-21 ENCOUNTER — OUTPATIENT (OUTPATIENT)
Dept: OUTPATIENT SERVICES | Facility: HOSPITAL | Age: 63
LOS: 1 days | Discharge: HOME | End: 2023-06-21

## 2023-06-21 DIAGNOSIS — Z96.652 PRESENCE OF LEFT ARTIFICIAL KNEE JOINT: Chronic | ICD-10-CM

## 2023-06-21 DIAGNOSIS — Z98.89 OTHER SPECIFIED POSTPROCEDURAL STATES: Chronic | ICD-10-CM

## 2023-06-21 DIAGNOSIS — D64.9 ANEMIA, UNSPECIFIED: ICD-10-CM

## 2023-06-29 ENCOUNTER — APPOINTMENT (OUTPATIENT)
Dept: HEMATOLOGY ONCOLOGY | Facility: CLINIC | Age: 63
End: 2023-06-29
Payer: COMMERCIAL

## 2023-06-29 ENCOUNTER — APPOINTMENT (OUTPATIENT)
Dept: HEMATOLOGY ONCOLOGY | Facility: CLINIC | Age: 63
End: 2023-06-29

## 2023-06-29 VITALS
BODY MASS INDEX: 35.42 KG/M2 | WEIGHT: 276.02 LBS | RESPIRATION RATE: 16 BRPM | HEART RATE: 99 BPM | OXYGEN SATURATION: 92 % | TEMPERATURE: 97 F | DIASTOLIC BLOOD PRESSURE: 78 MMHG | HEIGHT: 73.98 IN | SYSTOLIC BLOOD PRESSURE: 130 MMHG

## 2023-06-29 PROCEDURE — 99214 OFFICE O/P EST MOD 30 MIN: CPT

## 2023-06-29 NOTE — ASSESSMENT
[FreeTextEntry1] : 5/17/23 Ortho note reviewed.\par Patient underwent left knee arthroscopic surgery 8/2014. Approximately 2 weeks postoperatively, he noticed right lower extremity swelling and pain. He sought attention at an urgent care center, and had a right lower extremity ultrasound which revealed a DVT within the right popliteal, proximal and mid posterior tibial veins. He was begun on Xarelto. He had no prior history of venous thromboembolic disease nor any family history of venous thromboembolism.\par Prothrombin gene mutation study normal. APC resistance within normal limits. +LA at that time.  ROBBIE WNL.  Maintained on xarelto from 8/2014-12/2022.\par Lupus anticoagulant tested when patient off anticoagulation, and was negative in 12/2022. \par 3/2033 LE venous duplex study negative for DVT. \par \par 4/2023-RLE venous duplex study-nearly occlusive deep venous thrombosis in the right femoral, popliteal and posterior tibial veins, above and below the knee.\par The findings new from 03/02/2023. Resumed Xarelto.\par Have discussed with patient that bleeding risks with anticoagulation are to be considered versus clotting risks without anticoagulation in ongoing decision making.  \par Had discussed with patient recommended lifelong anticoagulation (as long as no bleeding/contraindication). Lupus anticoagulant screen positive on f/u testing, so recommended change to Coumadin anticoagulation.  Explained ongoing thrombosis risks with phospholipid antibodies. However, reportedly f/u testing had been done while on NOAC, so patient was switched back to xarelto during recent hospitalization.\par \par -- obtain follow-up lower extremity venous duplex study and CT angiogram of the chest 11/2023- pending these, can decide regarding possible orthopedic surgery (will have been on anticoagulation for > 6 months after most recent VTE episode).  Discussed that anticoagulation bridging would need need to be considered perioperatively as well.\par \par Patient was given the opportunity to ask questions.  His questions have been answered to his apparent satisfaction at this time.  He expresses understanding and willingness to comply with recommended follow-up.\par

## 2023-06-29 NOTE — HISTORY OF PRESENT ILLNESS
[de-identified] : Patient underwent left knee arthroscopic surgery 8/2014. Approximately 2 weeks postoperatively, he noticed right lower extremity swelling and pain. He sought attention at an urgent care center, and had a right lower extremity ultrasound which revealed a DVT within the right popliteal, proximal and mid posterior tibial veins.He was begun on Xarelto.He had no prior history of venous thromboembolic disease nor any family history of venous thromboembolism.\par Prothrombin gene mutation study normal. APC resistance within normal limits. +LA at that time.  ROBBIE WNL.  Maintained on Xarelto .\par \par 2/2018-10/2022-No hematology f/u.\par \par 10/2022-Returned for hematology f/u. 12/2022 LA screen negative (tested when off anticoagulant).\par \par Held anticoagulation 10/2022-4/2023.\par \par 4/2023-RLE venous duplex study-nearly occlusive deep venous thrombosis in the right femoral, popliteal and posterior tibial veins, above and below the knee.\par The findings are new from 03/02/2023. Resumed Xarelto.\par \par  [de-identified] : Right TKR being post-poned due to DVT/PE. Thinking to do surgery ~12/202\par S/P hospitalization for recurrent pneumonia/PCP. Uses O2 at home as needed. \par Diagnosed with bronchiolitis-s/p transbronchial lung biopsy.\par Has appt. to see CHETNA THOMAS 7/6/23-Dr. Rose.\par On 40 mg of prednisone for pulmonary disease.\par Diagnosed with prostate cancer-RT/treatment being deferred currently due to pulmonary issues.\par No current complaints of chest pain, shortness of breath at rest, calf pain. No abnormal bleeding reported. No fevers. +Right knee pain.\par \par

## 2023-06-29 NOTE — PHYSICAL EXAM
[Normal] : normal appearance, no rash, nodules, vesicles, ulcers, erythema [de-identified] : non-toxic appearing [de-identified] : breathing appeared unlabored [de-identified] : no calf tenderness [de-identified] : coloration appeared normal

## 2023-06-29 NOTE — REVIEW OF SYSTEMS
[Negative] : Allergic/Immunologic [Chest Pain] : no chest pain [Dysuria] : no dysuria [Joint Pain] : joint pain

## 2023-06-29 NOTE — REASON FOR VISIT
[Follow-Up Visit] : a follow-up visit for [Home] : at home, [unfilled] , at the time of the visit. [Medical Office: ([facility])___] : at the medical office located in  [Spouse] : spouse [Patient] : the patient [Self] : self [FreeTextEntry2] : DVT

## 2023-07-08 LAB
CULTURE RESULTS: SIGNIFICANT CHANGE UP
SPECIMEN SOURCE: SIGNIFICANT CHANGE UP

## 2023-07-12 ENCOUNTER — APPOINTMENT (OUTPATIENT)
Dept: CT IMAGING | Facility: CLINIC | Age: 63
End: 2023-07-12
Payer: COMMERCIAL

## 2023-07-12 ENCOUNTER — OUTPATIENT (OUTPATIENT)
Dept: OUTPATIENT SERVICES | Facility: HOSPITAL | Age: 63
LOS: 1 days | End: 2023-07-12
Payer: COMMERCIAL

## 2023-07-12 DIAGNOSIS — Z98.89 OTHER SPECIFIED POSTPROCEDURAL STATES: Chronic | ICD-10-CM

## 2023-07-12 DIAGNOSIS — Z96.652 PRESENCE OF LEFT ARTIFICIAL KNEE JOINT: Chronic | ICD-10-CM

## 2023-07-12 DIAGNOSIS — Z00.8 ENCOUNTER FOR OTHER GENERAL EXAMINATION: ICD-10-CM

## 2023-07-12 PROCEDURE — 71250 CT THORAX DX C-: CPT

## 2023-07-12 PROCEDURE — 71250 CT THORAX DX C-: CPT | Mod: 26

## 2023-07-26 LAB
CULTURE RESULTS: SIGNIFICANT CHANGE UP
SPECIMEN SOURCE: SIGNIFICANT CHANGE UP

## 2023-07-30 NOTE — H&P PST ADULT - NSANTHBPHIGHRD_ENT_A_CORE
Patient arrives concerned for pain to RLE. States that it started a month and a half ago after having sutures.  Scabbed, red and swollen inner RLE  
No

## 2023-07-31 ENCOUNTER — APPOINTMENT (OUTPATIENT)
Dept: CARDIOLOGY | Facility: CLINIC | Age: 63
End: 2023-07-31
Payer: COMMERCIAL

## 2023-07-31 VITALS
OXYGEN SATURATION: 92 % | WEIGHT: 274 LBS | SYSTOLIC BLOOD PRESSURE: 130 MMHG | DIASTOLIC BLOOD PRESSURE: 83 MMHG | HEIGHT: 74 IN | TEMPERATURE: 92.3 F | HEART RATE: 105 BPM | BODY MASS INDEX: 35.16 KG/M2 | RESPIRATION RATE: 20 BRPM

## 2023-07-31 DIAGNOSIS — R06.00 DYSPNEA, UNSPECIFIED: ICD-10-CM

## 2023-07-31 PROCEDURE — 93000 ELECTROCARDIOGRAM COMPLETE: CPT

## 2023-07-31 PROCEDURE — 99215 OFFICE O/P EST HI 40 MIN: CPT | Mod: 25

## 2023-07-31 RX ORDER — RIVAROXABAN 20 MG/1
20 TABLET, FILM COATED ORAL
Refills: 0 | Status: ACTIVE | COMMUNITY

## 2023-07-31 NOTE — REASON FOR VISIT
[FreeTextEntry1] : Cardiology follow-up visit for evaluation and management of dyspnea, abnormal EKG and hyperlipidemia.

## 2023-07-31 NOTE — HISTORY OF PRESENT ILLNESS
[FreeTextEntry1] : Since last visit with me, he states over the past few months he was hospitalized on 5 different occasions.  He had recurrent episodes of dyspnea and recurrent pneumonia.  Also diagnosed with DVT and pulmonary embolism. Also recent diagnosis of prostate cancer. He states that he is slowly recovering from the above medical issues.  Activity levels are limited secondary to chronic knee pain.  He would like to consider total knee replacement surgery but has been told that now is not the right time. He has no complaints of chest pain or chest pressure.  No palpitations.  No dizziness.  No syncope.  No edema, no orthopnea.   He has been following with multiple subspecialists including pulmonary, ID, hematology and urology.

## 2023-07-31 NOTE — PHYSICAL EXAM
[Normal] : alert and oriented, normal memory [de-identified] : decreased breath sounds bilaterally

## 2023-07-31 NOTE — CARDIOLOGY SUMMARY
[de-identified] : July 31, 2023. sinus tachycardia, left anterior hemiblock  [de-identified] : April 20, 2021. Normal vasodilator myocardial perfusion stress test.  [de-identified] : April 20, 2021. Normal LV function. Calcified AV with a normal opening

## 2023-07-31 NOTE — REVIEW OF SYSTEMS
[Feeling Fatigued] : feeling fatigued [SOB] : shortness of breath [Wheezing] : wheezing [Joint Pain] : joint pain [Negative] : Heme/Lymph

## 2023-07-31 NOTE — DISCUSSION/SUMMARY
[FreeTextEntry1] : 63-year-old man with chronic dyspnea related to lung disease (COPD, heavy smoker in the past), hyperlipidemia and abnormal EKG (sinus rhythm, left anterior hemiblock). Status post multiple hospitalizations, as noted above for acute pulmonary issues including recurrent pneumonia, DVT/PE. Recent diagnosis of prostate cancer. Otherwise asymptomatic with respect to cardiac issues. Blood pressure stable.  There is no JVD.  Lung fields clear.  No edema.  He is euvolemic. EKG sinus tachycardia.  Left anterior hemiblock. Noninvasive cardiac testing done in 2021 was favorable.  Plan 1.  Current medication list is reviewed, no changes. 2.  Continue statin, as noted above. 3.  Next visit with me in the office in 6 months. 4.  He will continue follow-up with multiple subspecialists and also with primary care physician. 5.  The above was reviewed with him, all questions answered.

## 2023-09-01 ENCOUNTER — OUTPATIENT (OUTPATIENT)
Dept: OUTPATIENT SERVICES | Facility: HOSPITAL | Age: 63
LOS: 1 days | Discharge: HOME | End: 2023-09-01

## 2023-09-01 DIAGNOSIS — D64.9 ANEMIA, UNSPECIFIED: ICD-10-CM

## 2023-09-01 DIAGNOSIS — Z98.89 OTHER SPECIFIED POSTPROCEDURAL STATES: Chronic | ICD-10-CM

## 2023-09-01 DIAGNOSIS — Z96.652 PRESENCE OF LEFT ARTIFICIAL KNEE JOINT: Chronic | ICD-10-CM

## 2023-09-13 ENCOUNTER — APPOINTMENT (OUTPATIENT)
Dept: HEMATOLOGY ONCOLOGY | Facility: CLINIC | Age: 63
End: 2023-09-13
Payer: COMMERCIAL

## 2023-09-13 PROCEDURE — 99214 OFFICE O/P EST MOD 30 MIN: CPT | Mod: 95

## 2023-09-26 ENCOUNTER — OUTPATIENT (OUTPATIENT)
Dept: OUTPATIENT SERVICES | Facility: HOSPITAL | Age: 63
LOS: 1 days | End: 2023-09-26
Payer: COMMERCIAL

## 2023-09-26 ENCOUNTER — APPOINTMENT (OUTPATIENT)
Dept: ULTRASOUND IMAGING | Facility: CLINIC | Age: 63
End: 2023-09-26
Payer: COMMERCIAL

## 2023-09-26 DIAGNOSIS — Z98.89 OTHER SPECIFIED POSTPROCEDURAL STATES: Chronic | ICD-10-CM

## 2023-09-26 DIAGNOSIS — Z96.652 PRESENCE OF LEFT ARTIFICIAL KNEE JOINT: Chronic | ICD-10-CM

## 2023-09-26 DIAGNOSIS — I82.401 ACUTE EMBOLISM AND THROMBOSIS OF UNSPECIFIED DEEP VEINS OF RIGHT LOWER EXTREMITY: ICD-10-CM

## 2023-09-26 PROCEDURE — 93970 EXTREMITY STUDY: CPT | Mod: 26

## 2023-09-26 PROCEDURE — 93970 EXTREMITY STUDY: CPT

## 2023-09-28 ENCOUNTER — RX RENEWAL (OUTPATIENT)
Age: 63
End: 2023-09-28

## 2023-10-02 ENCOUNTER — APPOINTMENT (OUTPATIENT)
Dept: CT IMAGING | Facility: CLINIC | Age: 63
End: 2023-10-02

## 2023-10-09 ENCOUNTER — APPOINTMENT (OUTPATIENT)
Dept: CT IMAGING | Facility: CLINIC | Age: 63
End: 2023-10-09

## 2023-10-18 ENCOUNTER — APPOINTMENT (OUTPATIENT)
Dept: HEMATOLOGY ONCOLOGY | Facility: CLINIC | Age: 63
End: 2023-10-18

## 2023-10-20 ENCOUNTER — OUTPATIENT (OUTPATIENT)
Dept: OUTPATIENT SERVICES | Facility: HOSPITAL | Age: 63
LOS: 1 days | End: 2023-10-20
Payer: COMMERCIAL

## 2023-10-20 ENCOUNTER — APPOINTMENT (OUTPATIENT)
Dept: RADIOLOGY | Facility: CLINIC | Age: 63
End: 2023-10-20
Payer: COMMERCIAL

## 2023-10-20 DIAGNOSIS — Z98.89 OTHER SPECIFIED POSTPROCEDURAL STATES: Chronic | ICD-10-CM

## 2023-10-20 DIAGNOSIS — Z96.652 PRESENCE OF LEFT ARTIFICIAL KNEE JOINT: Chronic | ICD-10-CM

## 2023-10-20 DIAGNOSIS — Z00.8 ENCOUNTER FOR OTHER GENERAL EXAMINATION: ICD-10-CM

## 2023-10-20 PROCEDURE — 73562 X-RAY EXAM OF KNEE 3: CPT

## 2023-10-20 PROCEDURE — 73562 X-RAY EXAM OF KNEE 3: CPT | Mod: 26,50

## 2023-11-01 ENCOUNTER — APPOINTMENT (OUTPATIENT)
Dept: ULTRASOUND IMAGING | Facility: CLINIC | Age: 63
End: 2023-11-01
Payer: COMMERCIAL

## 2023-11-01 ENCOUNTER — OUTPATIENT (OUTPATIENT)
Dept: OUTPATIENT SERVICES | Facility: HOSPITAL | Age: 63
LOS: 1 days | End: 2023-11-01
Payer: COMMERCIAL

## 2023-11-01 ENCOUNTER — APPOINTMENT (OUTPATIENT)
Dept: CT IMAGING | Facility: CLINIC | Age: 63
End: 2023-11-01
Payer: COMMERCIAL

## 2023-11-01 DIAGNOSIS — Z98.89 OTHER SPECIFIED POSTPROCEDURAL STATES: Chronic | ICD-10-CM

## 2023-11-01 DIAGNOSIS — Z96.652 PRESENCE OF LEFT ARTIFICIAL KNEE JOINT: Chronic | ICD-10-CM

## 2023-11-01 DIAGNOSIS — I82.401 ACUTE EMBOLISM AND THROMBOSIS OF UNSPECIFIED DEEP VEINS OF RIGHT LOWER EXTREMITY: ICD-10-CM

## 2023-11-01 PROCEDURE — 71275 CT ANGIOGRAPHY CHEST: CPT | Mod: 26

## 2023-11-01 PROCEDURE — 71275 CT ANGIOGRAPHY CHEST: CPT

## 2023-11-06 ENCOUNTER — OUTPATIENT (OUTPATIENT)
Dept: OUTPATIENT SERVICES | Facility: HOSPITAL | Age: 63
LOS: 1 days | Discharge: HOME | End: 2023-11-06

## 2023-11-06 DIAGNOSIS — Z96.652 PRESENCE OF LEFT ARTIFICIAL KNEE JOINT: Chronic | ICD-10-CM

## 2023-11-06 DIAGNOSIS — Z98.89 OTHER SPECIFIED POSTPROCEDURAL STATES: Chronic | ICD-10-CM

## 2023-11-06 DIAGNOSIS — D64.9 ANEMIA, UNSPECIFIED: ICD-10-CM

## 2023-11-13 ENCOUNTER — APPOINTMENT (OUTPATIENT)
Dept: ULTRASOUND IMAGING | Facility: CLINIC | Age: 63
End: 2023-11-13

## 2023-11-13 ENCOUNTER — APPOINTMENT (OUTPATIENT)
Dept: CT IMAGING | Facility: CLINIC | Age: 63
End: 2023-11-13

## 2023-11-16 ENCOUNTER — APPOINTMENT (OUTPATIENT)
Dept: HEMATOLOGY ONCOLOGY | Facility: CLINIC | Age: 63
End: 2023-11-16
Payer: COMMERCIAL

## 2023-11-16 PROCEDURE — 99214 OFFICE O/P EST MOD 30 MIN: CPT | Mod: 95

## 2023-12-13 ENCOUNTER — APPOINTMENT (OUTPATIENT)
Dept: HEMATOLOGY ONCOLOGY | Facility: CLINIC | Age: 63
End: 2023-12-13

## 2023-12-29 ENCOUNTER — NON-APPOINTMENT (OUTPATIENT)
Age: 63
End: 2023-12-29

## 2024-01-03 ENCOUNTER — APPOINTMENT (OUTPATIENT)
Dept: CARDIOLOGY | Facility: CLINIC | Age: 64
End: 2024-01-03
Payer: COMMERCIAL

## 2024-01-03 ENCOUNTER — NON-APPOINTMENT (OUTPATIENT)
Age: 64
End: 2024-01-03

## 2024-01-03 VITALS
SYSTOLIC BLOOD PRESSURE: 139 MMHG | HEIGHT: 74 IN | OXYGEN SATURATION: 100 % | BODY MASS INDEX: 36.19 KG/M2 | WEIGHT: 282 LBS | HEART RATE: 92 BPM | DIASTOLIC BLOOD PRESSURE: 92 MMHG

## 2024-01-03 DIAGNOSIS — E78.5 HYPERLIPIDEMIA, UNSPECIFIED: ICD-10-CM

## 2024-01-03 DIAGNOSIS — R94.31 ABNORMAL ELECTROCARDIOGRAM [ECG] [EKG]: ICD-10-CM

## 2024-01-03 PROCEDURE — 93306 TTE W/DOPPLER COMPLETE: CPT

## 2024-01-03 PROCEDURE — 93000 ELECTROCARDIOGRAM COMPLETE: CPT

## 2024-01-03 PROCEDURE — 99215 OFFICE O/P EST HI 40 MIN: CPT | Mod: 25

## 2024-01-03 RX ORDER — MELOXICAM 15 MG/1
15 TABLET ORAL
Qty: 30 | Refills: 0 | Status: DISCONTINUED | COMMUNITY
Start: 2022-11-05 | End: 2024-01-03

## 2024-01-03 RX ORDER — NAPROXEN 500 MG/1
500 TABLET ORAL TWICE DAILY
Qty: 30 | Refills: 1 | Status: DISCONTINUED | COMMUNITY
Start: 2023-03-13 | End: 2024-01-03

## 2024-01-03 RX ORDER — WARFARIN 2.5 MG/1
2.5 TABLET ORAL DAILY
Qty: 7 | Refills: 0 | Status: DISCONTINUED | COMMUNITY
Start: 2023-04-14 | End: 2024-01-03

## 2024-01-03 RX ORDER — OXYCODONE AND ACETAMINOPHEN 5; 325 MG/1; MG/1
5-325 TABLET ORAL
Qty: 28 | Refills: 0 | Status: DISCONTINUED | COMMUNITY
Start: 2023-05-23 | End: 2024-01-03

## 2024-01-03 NOTE — HISTORY OF PRESENT ILLNESS
[FreeTextEntry1] : Since last visit with me, he has been having ongoing pain in his right knee. He has been evaluated by orthopedics and is felt to be a good candidate for knee replacement surgery. He has history earlier this year of DVT and pulmonary embolism, and is also gotten clearance from hematologist, pulmonologist and vascular. No complaints of chest pain or chest pressure.  No palpitations.  No dizziness.  No syncope.  No edema.  No orthopnea.  No PND.

## 2024-01-03 NOTE — DISCUSSION/SUMMARY
[FreeTextEntry1] : 63-year-old man presents for preoperative cardiac clearance for total knee replacement surgery. He has abnormal EKG that is unchanged from prior.  Hyperlipidemia. Medical issues include DVT and pulmonary embolism currently on anticoagulation. Blood pressure stable.  There is no JVD.  Lung fields are clear.  No edema.  He is euvolemic. EKG is sinus rhythm.  Left axis deviation.  Largely unchanged from prior. Echocardiogram done today in the office.  Normal left ventricular systolic function.  Sclerotic aortic valve with a normal opening, Mild MR.  There is no evidence of recent myocardial infarction, congestive heart failure or malignant cardiac arrhythmia. The current cardiac status appears to be stable.  Plan 1.  Planned total knee replacement surgery can proceed at an acceptable cardiac risk.  There are no cardiac contraindications to proceeding with the surgery. 2.  He will be getting formal clearance also from hematology, pulmonary and vascular. 3.  DVT prophylaxis as per orthopedics. 4.  The above was reviewed with the patient, all questions answered.

## 2024-01-03 NOTE — REASON FOR VISIT
[FreeTextEntry1] : Cardiology follow-up visit for preoperative cardiac clearance.  Patient being considered for total knee replacement surgery to be done later this month. He has abnormal EKG and hyperlipidemia.

## 2024-01-03 NOTE — CARDIOLOGY SUMMARY
[de-identified] : Rubens 3 2024. Sinus Rhythm  -Left axis -anterior fascicular block. ABNORMAL [de-identified] : April 20, 2021. Normal vasodilator myocardial perfusion stress test.  [de-identified] : Rubens 3 2024.  Normal LV function. Sclerotic aortic valve with a normal opening. Mild MR. Mild TR. Normal PA pressure.

## 2024-01-03 NOTE — PHYSICAL EXAM
[Normal] : alert and oriented, normal memory [de-identified] : decreased breath sounds bilaterally

## 2024-01-31 ENCOUNTER — APPOINTMENT (OUTPATIENT)
Dept: CARDIOLOGY | Facility: CLINIC | Age: 64
End: 2024-01-31

## 2024-02-12 ENCOUNTER — OUTPATIENT (OUTPATIENT)
Dept: OUTPATIENT SERVICES | Facility: HOSPITAL | Age: 64
LOS: 1 days | Discharge: HOME | End: 2024-02-12

## 2024-02-12 DIAGNOSIS — Z96.652 PRESENCE OF LEFT ARTIFICIAL KNEE JOINT: Chronic | ICD-10-CM

## 2024-02-12 DIAGNOSIS — Z98.89 OTHER SPECIFIED POSTPROCEDURAL STATES: Chronic | ICD-10-CM

## 2024-02-12 DIAGNOSIS — D64.9 ANEMIA, UNSPECIFIED: ICD-10-CM

## 2024-02-14 ENCOUNTER — APPOINTMENT (OUTPATIENT)
Dept: ULTRASOUND IMAGING | Facility: CLINIC | Age: 64
End: 2024-02-14
Payer: MEDICARE

## 2024-02-14 PROBLEM — Z86.718 HISTORY OF DEEP VENOUS THROMBOSIS: Status: RESOLVED | Noted: 2018-05-17 | Resolved: 2024-02-14

## 2024-02-14 PROCEDURE — 93970 EXTREMITY STUDY: CPT

## 2024-02-20 ENCOUNTER — APPOINTMENT (OUTPATIENT)
Dept: HEMATOLOGY ONCOLOGY | Facility: CLINIC | Age: 64
End: 2024-02-20
Payer: MEDICARE

## 2024-02-20 VITALS
SYSTOLIC BLOOD PRESSURE: 125 MMHG | WEIGHT: 268.99 LBS | HEART RATE: 88 BPM | TEMPERATURE: 98.2 F | RESPIRATION RATE: 17 BRPM | OXYGEN SATURATION: 94 % | BODY MASS INDEX: 34.54 KG/M2 | DIASTOLIC BLOOD PRESSURE: 82 MMHG

## 2024-02-20 DIAGNOSIS — Z86.718 PERSONAL HISTORY OF OTHER VENOUS THROMBOSIS AND EMBOLISM: ICD-10-CM

## 2024-02-20 PROCEDURE — 99213 OFFICE O/P EST LOW 20 MIN: CPT

## 2024-02-20 NOTE — HISTORY OF PRESENT ILLNESS
[de-identified] : Patient underwent left knee arthroscopic surgery 8/2014. Approximately 2 weeks postoperatively, he noticed right lower extremity swelling and pain. He sought attention at an urgent care center, and had a right lower extremity ultrasound which revealed a DVT within the right popliteal, proximal and mid posterior tibial veins.He was begun on Xarelto.He had no prior history of venous thromboembolic disease nor any family history of venous thromboembolism.\par  Prothrombin gene mutation study normal. APC resistance within normal limits. +LA at that time.  ROBBIE WNL.  Maintained on Xarelto .\par  \par  2/2018-10/2022-No hematology f/u.\par  \par  10/2022-Returned for hematology f/u. 12/2022 LA screen negative (tested when off anticoagulant).\par  \par  Held anticoagulation 10/2022-4/2023.\par  \par  4/2023-RLE venous duplex study-nearly occlusive deep venous thrombosis in the right femoral, popliteal and posterior tibial veins, above and below the knee.\par  The findings are new from 03/02/2023. Resumed Xarelto.\par  \par   [de-identified] : S/P right TKR 2/2/24 at [facility]. Resumed the xarelto post-operatively. H/O bronchiolitis. Uses O2 at home as needed.  Has prostate cancer-Dr. Reese (urologist managing); radiation oncology with Dr. Issa. Plans cyberknife at [facility]. Not receiving hormone injections. No current complaints of chest pain, shortness of breath at rest, calf pain. No abnormal bleeding reported. No fevers.

## 2024-02-20 NOTE — REASON FOR VISIT
[Follow-Up Visit] : a follow-up visit for [Home] : at home, [unfilled] , at the time of the visit. [Medical Office: ([facility])___] : at the medical office located in  [Patient] : the patient [Self] : self [FreeTextEntry2] : DVT

## 2024-02-20 NOTE — REVIEW OF SYSTEMS
[Joint Pain] : joint pain [Negative] : Allergic/Immunologic [Chest Pain] : no chest pain [Dysuria] : no dysuria

## 2024-02-20 NOTE — ASSESSMENT
[FreeTextEntry1] : Patient underwent left knee arthroscopic surgery 8/2014. Approximately 2 weeks postoperatively, he noticed right lower extremity swelling and pain. He sought attention at an urgent care center, and had a right lower extremity ultrasound which revealed a DVT within the right popliteal, proximal and mid posterior tibial veins. He was begun on Xarelto. He had no prior history of venous thromboembolic disease nor any family history of venous thromboembolism. Prothrombin gene mutation study normal. APC resistance within normal limits. +LA at that time. ROBBIE WNL. Maintained on xarelto from 8/2014-12/2022. Lupus anticoagulant tested when patient off anticoagulation, and was negative in 12/2022. 3/2033 LE venous duplex study negative for DVT.  4/2023-RLE venous duplex study-nearly occlusive deep venous thrombosis in the right femoral, popliteal and posterior tibial veins, above and below the knee. The findings new from 03/02/2023. Resumed Xarelto. Have discussed with patient that bleeding risks with anticoagulation are to be considered versus clotting risks without anticoagulation in ongoing decision making. Had discussed with patient recommended lifelong anticoagulation (as long as no bleeding/contraindication). Lupus anticoagulant screen positive on f/u testing, so recommended change to Coumadin anticoagulation. Explained ongoing thrombosis risks with phospholipid antibodies. However, reportedly f/u testing had been done while on NOAC, so patient was switched back to xarelto during most recent hospitalization.  9/26/2023-LE venous duplex study-No evidence of deep venous thrombosis in either lower extremity. Chronic post thrombotic change in the right popliteal vein. 11/1/2023-CTA chest-no pulmonary embolism. 2/14/2024-LE venous duplex study- negative for acute DVT. Chronic thrombotic change right popliteal vein as seen previously. --clinically stable at present s/p right TKR. No orthopedic surgery complications reported. --patient to continue f/u with vascular MD Dr. Elena  --remains on xarelto 20 mg PO daily  Patient was given the opportunity to ask questions. His questions have been answered to his apparent satisfaction at this time. He expresses understanding and willingness to comply with recommended follow-up  -->RTO 6 months.

## 2024-02-20 NOTE — PHYSICAL EXAM
[Normal] : full range of motion and no deformities appreciated [de-identified] : breathing appeared unlabored [de-identified] : right knee incision C/D/I

## 2024-03-23 NOTE — DISCHARGE NOTE NURSING/CASE MANAGEMENT/SOCIAL WORK - NSDCFUADDAPPT_GEN_ALL_CORE_FT
Not applicable
Please follow up with your pulmonologist Dr. Wharton [phone] within 2 weeks of discharge for follow up.

## 2024-03-25 RX ORDER — RIVAROXABAN 20 MG/1
20 TABLET, FILM COATED ORAL
Qty: 30 | Refills: 2 | Status: ACTIVE | COMMUNITY
Start: 2024-03-25 | End: 1900-01-01

## 2024-04-15 NOTE — ED ADULT NURSE NOTE - NS ED NURSE RECORD ANOTHER HT AND WT
Please f/u with pt. Bun/cr ordered prior to CTA and creatinine and bun are stable. Let pt know. Thank you.    Yes

## 2024-05-20 DIAGNOSIS — I82.401 ACUTE EMBOLISM AND THROMBOSIS OF UNSPECIFIED DEEP VEINS OF RIGHT LOWER EXTREMITY: ICD-10-CM

## 2024-05-20 RX ORDER — ENOXAPARIN SODIUM 100 MG/ML
100 INJECTION, SOLUTION SUBCUTANEOUS DAILY
Qty: 8 | Refills: 0 | Status: ACTIVE | COMMUNITY
Start: 2024-05-20 | End: 1900-01-01

## 2024-05-20 RX ORDER — ENOXAPARIN SODIUM 80 MG/.8ML
80 INJECTION, SOLUTION SUBCUTANEOUS DAILY
Qty: 8 | Refills: 0 | Status: ACTIVE | COMMUNITY
Start: 2024-05-20 | End: 1900-01-01

## 2024-05-28 ENCOUNTER — NON-APPOINTMENT (OUTPATIENT)
Age: 64
End: 2024-05-28

## 2024-07-09 ENCOUNTER — APPOINTMENT (OUTPATIENT)
Dept: CT IMAGING | Facility: CLINIC | Age: 64
End: 2024-07-09
Payer: MEDICARE

## 2024-07-09 ENCOUNTER — OUTPATIENT (OUTPATIENT)
Dept: OUTPATIENT SERVICES | Facility: HOSPITAL | Age: 64
LOS: 1 days | End: 2024-07-09
Payer: MEDICARE

## 2024-07-09 DIAGNOSIS — Z00.8 ENCOUNTER FOR OTHER GENERAL EXAMINATION: ICD-10-CM

## 2024-07-09 DIAGNOSIS — Z96.652 PRESENCE OF LEFT ARTIFICIAL KNEE JOINT: Chronic | ICD-10-CM

## 2024-07-09 DIAGNOSIS — Z98.89 UNDEFINED: Chronic | ICD-10-CM

## 2024-07-09 PROCEDURE — 71250 CT THORAX DX C-: CPT | Mod: MH

## 2024-07-09 PROCEDURE — 71250 CT THORAX DX C-: CPT | Mod: 26,MH

## 2024-07-27 NOTE — ED ADULT NURSE NOTE - HOW OFTEN DO YOU HAVE A DRINK CONTAINING ALCOHOL?
Discharge paperwork, follow up care discussed with pt and family in room. They verbalized understanding and have no questions at time of discharge   Never

## 2024-07-29 ENCOUNTER — NON-APPOINTMENT (OUTPATIENT)
Age: 64
End: 2024-07-29

## 2024-07-31 ENCOUNTER — APPOINTMENT (OUTPATIENT)
Dept: SURGERY | Facility: CLINIC | Age: 64
End: 2024-07-31
Payer: MEDICARE

## 2024-07-31 VITALS
OXYGEN SATURATION: 98 % | HEART RATE: 71 BPM | HEIGHT: 74 IN | SYSTOLIC BLOOD PRESSURE: 126 MMHG | TEMPERATURE: 98.3 F | WEIGHT: 254 LBS | BODY MASS INDEX: 32.6 KG/M2 | DIASTOLIC BLOOD PRESSURE: 72 MMHG

## 2024-07-31 DIAGNOSIS — K42.9 UMBILICAL HERNIA W/OUT OBSTRUCTION OR GANGRENE: ICD-10-CM

## 2024-07-31 PROCEDURE — 99203 OFFICE O/P NEW LOW 30 MIN: CPT

## 2024-07-31 NOTE — PHYSICAL EXAM
[JVD] : no jugular venous distention  [Normal Breath Sounds] : Normal breath sounds [Abdomen Tenderness] : ~T ~M No abdominal tenderness [de-identified] : NAD [de-identified] : Reducible umbilical hernia, 4-5cm defect

## 2024-07-31 NOTE — REASON FOR VISIT
[Consultation] : a consultation visit [Spouse] : spouse [FreeTextEntry1] : Symptomatic Umbilical hernia

## 2024-08-02 PROBLEM — Z86.718 HISTORY OF DEEP VENOUS THROMBOSIS: Status: RESOLVED | Noted: 2018-05-17 | Resolved: 2024-08-02

## 2024-08-04 ENCOUNTER — OUTPATIENT (OUTPATIENT)
Dept: OUTPATIENT SERVICES | Facility: HOSPITAL | Age: 64
LOS: 1 days | Discharge: HOME | End: 2024-08-04

## 2024-08-04 DIAGNOSIS — Z98.89 OTHER SPECIFIED POSTPROCEDURAL STATES: Chronic | ICD-10-CM

## 2024-08-04 DIAGNOSIS — D64.9 ANEMIA, UNSPECIFIED: ICD-10-CM

## 2024-08-04 DIAGNOSIS — Z96.652 PRESENCE OF LEFT ARTIFICIAL KNEE JOINT: Chronic | ICD-10-CM

## 2024-08-05 ENCOUNTER — OUTPATIENT (OUTPATIENT)
Dept: OUTPATIENT SERVICES | Facility: HOSPITAL | Age: 64
LOS: 1 days | Discharge: HOME | End: 2024-08-05

## 2024-08-05 ENCOUNTER — NON-APPOINTMENT (OUTPATIENT)
Age: 64
End: 2024-08-05

## 2024-08-05 DIAGNOSIS — Z96.652 PRESENCE OF LEFT ARTIFICIAL KNEE JOINT: Chronic | ICD-10-CM

## 2024-08-05 DIAGNOSIS — D64.9 ANEMIA, UNSPECIFIED: ICD-10-CM

## 2024-08-05 DIAGNOSIS — Z98.89 OTHER SPECIFIED POSTPROCEDURAL STATES: Chronic | ICD-10-CM

## 2024-08-05 LAB
APTT BLD: 30.9 SEC
BASOPHILS # BLD AUTO: 0.06 K/UL
BASOPHILS NFR BLD AUTO: 0.7 %
EOSINOPHIL # BLD AUTO: 0.24 K/UL
EOSINOPHIL NFR BLD AUTO: 2.7 %
HCT VFR BLD CALC: 45.9 %
HGB BLD-MCNC: 14.5 G/DL
IMM GRANULOCYTES NFR BLD AUTO: 0.5 %
INR PPP: 1.08 RATIO
LYMPHOCYTES # BLD AUTO: 1.65 K/UL
LYMPHOCYTES NFR BLD AUTO: 18.7 %
MAN DIFF?: NORMAL
MCHC RBC-ENTMCNC: 29.5 PG
MCHC RBC-ENTMCNC: 31.6 GM/DL
MCV RBC AUTO: 93.3 FL
MONOCYTES # BLD AUTO: 0.91 K/UL
MONOCYTES NFR BLD AUTO: 10.3 %
NEUTROPHILS # BLD AUTO: 5.92 K/UL
NEUTROPHILS NFR BLD AUTO: 67.1 %
PLATELET # BLD AUTO: 235 K/UL
PT BLD: 12.2 SEC
RBC # BLD: 4.92 M/UL
RBC # FLD: 15.9 %
WBC # FLD AUTO: 8.82 K/UL

## 2024-08-06 ENCOUNTER — NON-APPOINTMENT (OUTPATIENT)
Age: 64
End: 2024-08-06

## 2024-08-06 ENCOUNTER — RESULT CHARGE (OUTPATIENT)
Age: 64
End: 2024-08-06

## 2024-08-06 ENCOUNTER — RESULT REVIEW (OUTPATIENT)
Age: 64
End: 2024-08-06

## 2024-08-06 ENCOUNTER — OUTPATIENT (OUTPATIENT)
Dept: OUTPATIENT SERVICES | Facility: HOSPITAL | Age: 64
LOS: 1 days | End: 2024-08-06
Payer: MEDICARE

## 2024-08-06 VITALS
HEART RATE: 74 BPM | SYSTOLIC BLOOD PRESSURE: 150 MMHG | DIASTOLIC BLOOD PRESSURE: 89 MMHG | RESPIRATION RATE: 16 BRPM | WEIGHT: 257.94 LBS | TEMPERATURE: 97 F | OXYGEN SATURATION: 94 % | HEIGHT: 74 IN

## 2024-08-06 DIAGNOSIS — Z98.890 OTHER SPECIFIED POSTPROCEDURAL STATES: Chronic | ICD-10-CM

## 2024-08-06 DIAGNOSIS — Z98.89 UNDEFINED: Chronic | ICD-10-CM

## 2024-08-06 DIAGNOSIS — Z01.818 ENCOUNTER FOR OTHER PREPROCEDURAL EXAMINATION: ICD-10-CM

## 2024-08-06 DIAGNOSIS — Z96.652 PRESENCE OF LEFT ARTIFICIAL KNEE JOINT: Chronic | ICD-10-CM

## 2024-08-06 DIAGNOSIS — Z96.651 PRESENCE OF RIGHT ARTIFICIAL KNEE JOINT: Chronic | ICD-10-CM

## 2024-08-06 LAB
ANION GAP SERPL CALC-SCNC: 4 MMOL/L — LOW (ref 5–17)
APPEARANCE UR: CLEAR — SIGNIFICANT CHANGE UP
APTT BLD: 36.1 SEC — HIGH (ref 24.5–35.6)
BACTERIA # UR AUTO: NEGATIVE /HPF — SIGNIFICANT CHANGE UP
BASOPHILS # BLD AUTO: 0.04 K/UL — SIGNIFICANT CHANGE UP (ref 0–0.2)
BASOPHILS NFR BLD AUTO: 0.5 % — SIGNIFICANT CHANGE UP (ref 0–2)
BILIRUB UR-MCNC: NEGATIVE — SIGNIFICANT CHANGE UP
BLD GP AB SCN SERPL QL: SIGNIFICANT CHANGE UP
BUN SERPL-MCNC: 9 MG/DL — SIGNIFICANT CHANGE UP (ref 7–23)
CALCIUM SERPL-MCNC: 9.5 MG/DL — SIGNIFICANT CHANGE UP (ref 8.5–10.1)
CAST: 0 /LPF — SIGNIFICANT CHANGE UP (ref 0–4)
CHLORIDE SERPL-SCNC: 106 MMOL/L — SIGNIFICANT CHANGE UP (ref 96–108)
CO2 SERPL-SCNC: 30 MMOL/L — SIGNIFICANT CHANGE UP (ref 22–31)
COLOR SPEC: SIGNIFICANT CHANGE UP
CREAT SERPL-MCNC: 0.79 MG/DL — SIGNIFICANT CHANGE UP (ref 0.5–1.3)
DIFF PNL FLD: NEGATIVE — SIGNIFICANT CHANGE UP
EGFR: 99 ML/MIN/1.73M2 — SIGNIFICANT CHANGE UP
EOSINOPHIL # BLD AUTO: 0.19 K/UL — SIGNIFICANT CHANGE UP (ref 0–0.5)
EOSINOPHIL NFR BLD AUTO: 2.3 % — SIGNIFICANT CHANGE UP (ref 0–6)
GLUCOSE SERPL-MCNC: 98 MG/DL — SIGNIFICANT CHANGE UP (ref 70–99)
GLUCOSE UR QL: NEGATIVE MG/DL — SIGNIFICANT CHANGE UP
HCT VFR BLD CALC: 47.8 % — SIGNIFICANT CHANGE UP (ref 39–50)
HGB BLD-MCNC: 15.7 G/DL — SIGNIFICANT CHANGE UP (ref 13–17)
IMM GRANULOCYTES NFR BLD AUTO: 0.4 % — SIGNIFICANT CHANGE UP (ref 0–0.9)
INR BLD: 1.13 RATIO — SIGNIFICANT CHANGE UP (ref 0.85–1.18)
KETONES UR-MCNC: ABNORMAL MG/DL
LEUKOCYTE ESTERASE UR-ACNC: ABNORMAL
LYMPHOCYTES # BLD AUTO: 1.52 K/UL — SIGNIFICANT CHANGE UP (ref 1–3.3)
LYMPHOCYTES # BLD AUTO: 18.8 % — SIGNIFICANT CHANGE UP (ref 13–44)
MCHC RBC-ENTMCNC: 30 PG — SIGNIFICANT CHANGE UP (ref 27–34)
MCHC RBC-ENTMCNC: 32.8 GM/DL — SIGNIFICANT CHANGE UP (ref 32–36)
MCV RBC AUTO: 91.2 FL — SIGNIFICANT CHANGE UP (ref 80–100)
MONOCYTES # BLD AUTO: 0.87 K/UL — SIGNIFICANT CHANGE UP (ref 0–0.9)
MONOCYTES NFR BLD AUTO: 10.7 % — SIGNIFICANT CHANGE UP (ref 2–14)
NEUTROPHILS # BLD AUTO: 5.45 K/UL — SIGNIFICANT CHANGE UP (ref 1.8–7.4)
NEUTROPHILS NFR BLD AUTO: 67.3 % — SIGNIFICANT CHANGE UP (ref 43–77)
NITRITE UR-MCNC: NEGATIVE — SIGNIFICANT CHANGE UP
PH UR: 7 — SIGNIFICANT CHANGE UP (ref 5–8)
PLATELET # BLD AUTO: 239 K/UL — SIGNIFICANT CHANGE UP (ref 150–400)
POTASSIUM SERPL-MCNC: 4.1 MMOL/L — SIGNIFICANT CHANGE UP (ref 3.5–5.3)
POTASSIUM SERPL-SCNC: 4.1 MMOL/L — SIGNIFICANT CHANGE UP (ref 3.5–5.3)
PROT UR-MCNC: SIGNIFICANT CHANGE UP MG/DL
PROTHROM AB SERPL-ACNC: 12.7 SEC — SIGNIFICANT CHANGE UP (ref 9.5–13)
RBC # BLD: 5.24 M/UL — SIGNIFICANT CHANGE UP (ref 4.2–5.8)
RBC # FLD: 15.6 % — HIGH (ref 10.3–14.5)
RBC CASTS # UR COMP ASSIST: 1 /HPF — SIGNIFICANT CHANGE UP (ref 0–4)
SODIUM SERPL-SCNC: 140 MMOL/L — SIGNIFICANT CHANGE UP (ref 135–145)
SP GR SPEC: 1.02 — SIGNIFICANT CHANGE UP (ref 1–1.03)
SQUAMOUS # UR AUTO: 0 /HPF — SIGNIFICANT CHANGE UP (ref 0–5)
UROBILINOGEN FLD QL: 1 MG/DL — SIGNIFICANT CHANGE UP (ref 0.2–1)
WBC # BLD: 8.1 K/UL — SIGNIFICANT CHANGE UP (ref 3.8–10.5)
WBC # FLD AUTO: 8.1 K/UL — SIGNIFICANT CHANGE UP (ref 3.8–10.5)
WBC UR QL: 3 /HPF — SIGNIFICANT CHANGE UP (ref 0–5)

## 2024-08-06 PROCEDURE — 71046 X-RAY EXAM CHEST 2 VIEWS: CPT | Mod: 26

## 2024-08-06 PROCEDURE — 85025 COMPLETE CBC W/AUTO DIFF WBC: CPT

## 2024-08-06 PROCEDURE — 85610 PROTHROMBIN TIME: CPT

## 2024-08-06 PROCEDURE — 93010 ELECTROCARDIOGRAM REPORT: CPT

## 2024-08-06 PROCEDURE — 80048 BASIC METABOLIC PNL TOTAL CA: CPT

## 2024-08-06 PROCEDURE — 36415 COLL VENOUS BLD VENIPUNCTURE: CPT

## 2024-08-06 PROCEDURE — 71046 X-RAY EXAM CHEST 2 VIEWS: CPT

## 2024-08-06 PROCEDURE — 85730 THROMBOPLASTIN TIME PARTIAL: CPT

## 2024-08-06 PROCEDURE — 86901 BLOOD TYPING SEROLOGIC RH(D): CPT

## 2024-08-06 PROCEDURE — 93005 ELECTROCARDIOGRAM TRACING: CPT

## 2024-08-06 PROCEDURE — 86900 BLOOD TYPING SEROLOGIC ABO: CPT

## 2024-08-06 PROCEDURE — 87641 MR-STAPH DNA AMP PROBE: CPT

## 2024-08-06 PROCEDURE — 86850 RBC ANTIBODY SCREEN: CPT

## 2024-08-06 PROCEDURE — 81001 URINALYSIS AUTO W/SCOPE: CPT

## 2024-08-06 PROCEDURE — 99214 OFFICE O/P EST MOD 30 MIN: CPT | Mod: 25

## 2024-08-06 PROCEDURE — 87640 STAPH A DNA AMP PROBE: CPT

## 2024-08-06 NOTE — H&P PST ADULT - NS PRO FEM  PAP SMEARS 3YRS
Airway  Urgency: elective    Airway not difficult    General Information and Staff    Patient location during procedure: OR  Anesthesiologist: Almita Teixeira DO    Indications and Patient Condition  Indications for airway management: airway protection    Preoxygenated: yes  MILS not maintained throughout  Mask difficulty assessment: 1 - vent by mask    Final Airway Details  Final airway type: endotracheal airway      Successful airway: ETT  Cuffed: yes   Successful intubation technique: direct laryngoscopy  Facilitating devices/methods: intubating stylet and cricoid pressure  Endotracheal tube insertion site: oral  Blade: Valentino  Blade size: 3  ETT size (mm): 7.5  Cormack-Lehane Classification: grade IIa - partial view of glottis  Placement verified by: chest auscultation and capnometry   Measured from: lips  ETT/EBT  to lips (cm): 21  Number of attempts at approach: 1  Assessment: lips, teeth, and gum same as pre-op and atraumatic intubation    Additional Comments  Negative epigastric sounds, Breath sound equal bilaterally with symmetric chest rise and fall            
not applicable (Male)

## 2024-08-06 NOTE — H&P PST ADULT - NSICDXPASTSURGICALHX_GEN_ALL_CORE_FT
PAST SURGICAL HISTORY:  H/O total knee replacement, left 2016    S/P knee surgery 2013 left meniscus  2015-right meniscus    S/P laminectomy 1990     PAST SURGICAL HISTORY:  H/O arthroscopy of left knee x2---2013, 2014    H/O arthroscopy of right knee 2015    H/O bilateral inguinal hernia repair     H/O colonoscopy     H/O total knee replacement, left 2016    H/O total knee replacement, right 2/2024    History of esophagogastroduodenoscopy (EGD)     History of lumbar laminectomy 1990

## 2024-08-06 NOTE — H&P PST ADULT - ASSESSMENT
[age] y.o male scheduled for Laparoscopic Umbilical Hernia  Plan  1. Stop all NSAIDS, herbal supplements and vitamins for 7 days. Xarelto per Hematology directions   2. NPO at midnight.  3. Take the following medications Omeprazole  with small sips of water on the morning of your procedure/surgery. Use inhaler per normal routine   4. Use EZ sponges as directed  5. Use mupirocin as directed  6. Labs, EKG, CXR, MRSA swab as per surgeon  7. PMD FELECIA Cortes and Dr Grace Hematology visit for optimization prior to surgery as per surgeon.  8. Preprocedure education provided

## 2024-08-06 NOTE — H&P PST ADULT - HISTORY OF PRESENT ILLNESS
64 y.o WD, WN male presents to PST with hx of an umbilical hernia. He states he has had the hernia for years; hernia has increased in size and become uncomfortable. His hx is significant for right lower leg DVT x2 last 2023 resulting in PE 2023 - on Xarelto, Hyperlipidemia, COPD, GERD and Prostate cancer- just completed Cyberknife treatment. Patient is scheduled for a Laparoscopic Umbilical Hernia

## 2024-08-06 NOTE — H&P PST ADULT - HEMATOLOGY/LYMPHATICS COMMENTS
Hx DVT right leg 2014, recurrence 2023 after Xarelto stopped, progressed to  PE,--on Xarelto managed by Hematology - to see for optimization prior to surgery

## 2024-08-06 NOTE — H&P PST ADULT - NSICDXPASTMEDICALHX_GEN_ALL_CORE_FT
PAST MEDICAL HISTORY:  Basal cell carcinoma removed    Bronchitis winter 2014    COPD (chronic obstructive pulmonary disease)     DVT (deep venous thrombosis) "right lower extremity,after meniscus repair  08/2014 & Had Hemato consult (Prothrombin gene mutation study was normal+LA),treated with Xarelto" & currently on xarelto.    GERD (gastroesophageal reflux disease)     Hyperlipidemia     Pneumonia     Prostate cancer      PAST MEDICAL HISTORY:  Arthritis     Basal cell carcinoma     Bilateral inguinal hernia     Bronchitis last 2014    COPD (chronic obstructive pulmonary disease)     DVT (deep venous thrombosis) "right lower extremity,after left meniscus repair  08/2014 & Had Hemato consult (Prothrombin gene mutation study was normal+LA),treated with Xarelto",remained on Xarelto until 2023 then discontinued,- unprovoked right leg DVT recurred- complicated by PE 2023--currently on Xarelto    GERD (gastroesophageal reflux disease)     H/O degenerative disc disease     Hyperlipidemia     Pneumonia x4---post COVID 2022    Prostate cancer Cyberknife treatment    Pulmonary embolism 2023    Squamous cell skin cancer     Umbilical hernia

## 2024-08-07 ENCOUNTER — APPOINTMENT (OUTPATIENT)
Dept: HEMATOLOGY ONCOLOGY | Facility: CLINIC | Age: 64
End: 2024-08-07

## 2024-08-07 DIAGNOSIS — Z01.818 ENCOUNTER FOR OTHER PREPROCEDURAL EXAMINATION: ICD-10-CM

## 2024-08-07 PROBLEM — K40.20 BILATERAL INGUINAL HERNIA, WITHOUT OBSTRUCTION OR GANGRENE, NOT SPECIFIED AS RECURRENT: Chronic | Status: ACTIVE | Noted: 2024-08-06

## 2024-08-07 PROBLEM — C44.92 SQUAMOUS CELL CARCINOMA OF SKIN, UNSPECIFIED: Chronic | Status: ACTIVE | Noted: 2024-08-06

## 2024-08-07 PROBLEM — I26.99 OTHER PULMONARY EMBOLISM WITHOUT ACUTE COR PULMONALE: Chronic | Status: ACTIVE | Noted: 2024-08-06

## 2024-08-07 PROBLEM — M19.90 UNSPECIFIED OSTEOARTHRITIS, UNSPECIFIED SITE: Chronic | Status: ACTIVE | Noted: 2024-08-06

## 2024-08-07 PROBLEM — K42.9 UMBILICAL HERNIA WITHOUT OBSTRUCTION OR GANGRENE: Chronic | Status: ACTIVE | Noted: 2024-08-06

## 2024-08-07 PROBLEM — Z87.39 PERSONAL HISTORY OF OTHER DISEASES OF THE MUSCULOSKELETAL SYSTEM AND CONNECTIVE TISSUE: Chronic | Status: ACTIVE | Noted: 2024-08-06

## 2024-08-07 LAB
MRSA PCR RESULT.: SIGNIFICANT CHANGE UP
S AUREUS DNA NOSE QL NAA+PROBE: SIGNIFICANT CHANGE UP

## 2024-08-07 PROCEDURE — 99214 OFFICE O/P EST MOD 30 MIN: CPT

## 2024-08-07 PROCEDURE — G2211 COMPLEX E/M VISIT ADD ON: CPT

## 2024-08-07 NOTE — HISTORY OF PRESENT ILLNESS
[de-identified] : Patient underwent left knee arthroscopic surgery 8/2014. Approximately 2 weeks postoperatively, he noticed right lower extremity swelling and pain. He sought attention at an urgent care center, and had a right lower extremity ultrasound which revealed a DVT within the right popliteal, proximal and mid posterior tibial veins.He was begun on Xarelto.He had no prior history of venous thromboembolic disease nor any family history of venous thromboembolism.\par  Prothrombin gene mutation study normal. APC resistance within normal limits. +LA at that time.  ROBBIE WNL.  Maintained on Xarelto .\par  \par  2/2018-10/2022-No hematology f/u.\par  \par  10/2022-Returned for hematology f/u. 12/2022 LA screen negative (tested when off anticoagulant).\par  \par  Held anticoagulation 10/2022-4/2023.\par  \par  4/2023-RLE venous duplex study-nearly occlusive deep venous thrombosis in the right femoral, popliteal and posterior tibial veins, above and below the knee.\par  The findings are new from 03/02/2023. Resumed Xarelto.\par  \par   [de-identified] : Scheduled to have umbilical herniorrhaphy laparoscopically 8/13/24-Dr. Adrian. Has had some abdominal discomfort. Has prostate cancer-Dr. Reese (urologist managing); radiation oncology with Dr. Issa. Completed cyberknife at [facility]. Not receiving hormone injections. H/O bronchiolitis. Uses O2 at home as needed.   No current complaints of chest pain, shortness of breath at rest, calf pain. No abnormal bleeding reported. No fevers.

## 2024-08-07 NOTE — ASSESSMENT
[FreeTextEntry1] : Lab work reviewed. Patient underwent left knee arthroscopic surgery 8/2014. Approximately 2 weeks postoperatively, he noticed right lower extremity swelling and pain. He sought attention at an urgent care center, and had a right lower extremity ultrasound which revealed a DVT within the right popliteal, proximal and mid posterior tibial veins. He was begun on Xarelto. He had no prior history of venous thromboembolic disease nor any family history of venous thromboembolism. Prothrombin gene mutation study normal. APC resistance within normal limits. +LA at that time. ROBBIE WNL. Maintained on xarelto from 8/2014-12/2022. Lupus anticoagulant tested when patient off anticoagulation, and was negative in 12/2022. 3/2023 LE venous duplex study negative for DVT.  4/2023-RLE venous duplex study-nearly occlusive deep venous thrombosis in the right femoral, popliteal and posterior tibial veins, above and below the knee. The findings new from 03/02/2023. Resumed Xarelto. --Have discussed with patient that bleeding risks with anticoagulation are to be considered versus clotting risks without anticoagulation in ongoing decision making. --Had discussed with patient recommended lifelong anticoagulation (as long as no bleeding/contraindication). Lupus anticoagulant screen was positive on f/u testing, so recommended change to Coumadin anticoagulation. Explained ongoing thrombosis risks with phospholipid antibodies. However, reportedly f/u testing had been done while on NOAC, so patient was switched back to xarelto during his last hospitalization.  9/26/2023-LE venous duplex study-No evidence of deep venous thrombosis in either lower extremity. Chronic post thrombotic change in the right popliteal vein. 11/1/2023-CTA chest-no pulmonary embolism. 2/14/2024-LE venous duplex study- negative for acute DVT. Chronic thrombotic change right popliteal vein as seen previously. No complications reported with right TKR 2/2024. --clinically stable at present. --patient to continue f/u with vascular MD Dr. Elena  -- Patient may proceed with his umbilical herniorrhaphy from a hematologic viewpoint with the following recommendations: To hold his Xarelto 2  days prior and the day of surgery.  He is to resume his anticoagulation as soon as cleared by his surgeon postoperatively to do so.  Patient was given the opportunity to ask questions. His questions have been answered to his apparent satisfaction at this time. He expresses understanding and willingness to comply with recommended follow-up  -->RTO 3 months.

## 2024-08-07 NOTE — HISTORY OF PRESENT ILLNESS
[de-identified] : Patient underwent left knee arthroscopic surgery 8/2014. Approximately 2 weeks postoperatively, he noticed right lower extremity swelling and pain. He sought attention at an urgent care center, and had a right lower extremity ultrasound which revealed a DVT within the right popliteal, proximal and mid posterior tibial veins.He was begun on Xarelto.He had no prior history of venous thromboembolic disease nor any family history of venous thromboembolism.\par  Prothrombin gene mutation study normal. APC resistance within normal limits. +LA at that time.  ROBBIE WNL.  Maintained on Xarelto .\par  \par  2/2018-10/2022-No hematology f/u.\par  \par  10/2022-Returned for hematology f/u. 12/2022 LA screen negative (tested when off anticoagulant).\par  \par  Held anticoagulation 10/2022-4/2023.\par  \par  4/2023-RLE venous duplex study-nearly occlusive deep venous thrombosis in the right femoral, popliteal and posterior tibial veins, above and below the knee.\par  The findings are new from 03/02/2023. Resumed Xarelto.\par  \par   [de-identified] : Scheduled to have umbilical herniorrhaphy laparoscopically 8/13/24-Dr. Adrian. Has had some abdominal discomfort. Has prostate cancer-Dr. Reese (urologist managing); radiation oncology with Dr. Issa. Completed cyberknife at [facility]. Not receiving hormone injections. H/O bronchiolitis. Uses O2 at home as needed.   No current complaints of chest pain, shortness of breath at rest, calf pain. No abnormal bleeding reported. No fevers.

## 2024-08-07 NOTE — PHYSICAL EXAM
[Normal] : affect appropriate [de-identified] : right knee incision C/D/I [de-identified] : breathing appeared unlabored

## 2024-08-07 NOTE — PHYSICAL EXAM
[Normal] : affect appropriate [de-identified] : right knee incision C/D/I [de-identified] : breathing appeared unlabored

## 2024-08-09 NOTE — ED ADULT NURSE NOTE - ED STAT RN HANDOFF TIME
Post-Op Assessment Note    CV Status:  Stable  Pain Score: 0    Pain management: adequate       Mental Status:  Alert and awake   Hydration Status:  Euvolemic   PONV Controlled:  Controlled   Airway Patency:  Patent     Post Op Vitals Reviewed: Yes    No anethesia notable event occurred.    Staff: Anesthesiologist, CRNA               /65 (08/09/24 1335)    Temp      Pulse 91 (08/09/24 1335)   Resp 19 (08/09/24 1335)    SpO2 97 % (08/09/24 1335)       11:06

## 2024-08-12 RX ORDER — FENTANYL CITRATE 1200 UG/1
50 LOZENGE ORAL; TRANSMUCOSAL
Refills: 0 | Status: DISCONTINUED | OUTPATIENT
Start: 2024-08-13 | End: 2024-08-13

## 2024-08-12 RX ORDER — DEXTROSE MONOHYDRATE, SODIUM CHLORIDE, SODIUM LACTATE, CALCIUM CHLORIDE, MAGNESIUM CHLORIDE 1.5; 538; 448; 18.4; 5.08 G/100ML; MG/100ML; MG/100ML; MG/100ML; MG/100ML
1000 SOLUTION INTRAPERITONEAL
Refills: 0 | Status: DISCONTINUED | OUTPATIENT
Start: 2024-08-13 | End: 2024-08-13

## 2024-08-12 RX ORDER — ONDANSETRON HCL/PF 4 MG/2 ML
4 VIAL (ML) INJECTION ONCE
Refills: 0 | Status: DISCONTINUED | OUTPATIENT
Start: 2024-08-13 | End: 2024-08-13

## 2024-08-13 ENCOUNTER — RESULT REVIEW (OUTPATIENT)
Age: 64
End: 2024-08-13

## 2024-08-13 ENCOUNTER — TRANSCRIPTION ENCOUNTER (OUTPATIENT)
Age: 64
End: 2024-08-13

## 2024-08-13 ENCOUNTER — OUTPATIENT (OUTPATIENT)
Dept: INPATIENT UNIT | Facility: HOSPITAL | Age: 64
LOS: 1 days | Discharge: HOME | End: 2024-08-13
Payer: MEDICARE

## 2024-08-13 VITALS
RESPIRATION RATE: 16 BRPM | TEMPERATURE: 98 F | OXYGEN SATURATION: 94 % | DIASTOLIC BLOOD PRESSURE: 77 MMHG | HEART RATE: 81 BPM | SYSTOLIC BLOOD PRESSURE: 136 MMHG | HEIGHT: 74 IN | WEIGHT: 257.94 LBS

## 2024-08-13 VITALS
TEMPERATURE: 98 F | RESPIRATION RATE: 16 BRPM | DIASTOLIC BLOOD PRESSURE: 82 MMHG | OXYGEN SATURATION: 95 % | SYSTOLIC BLOOD PRESSURE: 147 MMHG | HEART RATE: 79 BPM

## 2024-08-13 DIAGNOSIS — Z96.652 PRESENCE OF LEFT ARTIFICIAL KNEE JOINT: Chronic | ICD-10-CM

## 2024-08-13 DIAGNOSIS — Z96.653 PRESENCE OF ARTIFICIAL KNEE JOINT, BILATERAL: ICD-10-CM

## 2024-08-13 DIAGNOSIS — K42.0 UMBILICAL HERNIA WITH OBSTRUCTION, WITHOUT GANGRENE: ICD-10-CM

## 2024-08-13 DIAGNOSIS — Z98.890 OTHER SPECIFIED POSTPROCEDURAL STATES: Chronic | ICD-10-CM

## 2024-08-13 DIAGNOSIS — F17.210 NICOTINE DEPENDENCE, CIGARETTES, UNCOMPLICATED: ICD-10-CM

## 2024-08-13 DIAGNOSIS — Z98.89 UNDEFINED: Chronic | ICD-10-CM

## 2024-08-13 DIAGNOSIS — M19.90 UNSPECIFIED OSTEOARTHRITIS, UNSPECIFIED SITE: ICD-10-CM

## 2024-08-13 DIAGNOSIS — Z86.711 PERSONAL HISTORY OF PULMONARY EMBOLISM: ICD-10-CM

## 2024-08-13 DIAGNOSIS — Z85.46 PERSONAL HISTORY OF MALIGNANT NEOPLASM OF PROSTATE: ICD-10-CM

## 2024-08-13 DIAGNOSIS — J44.9 CHRONIC OBSTRUCTIVE PULMONARY DISEASE, UNSPECIFIED: ICD-10-CM

## 2024-08-13 DIAGNOSIS — Z85.828 PERSONAL HISTORY OF OTHER MALIGNANT NEOPLASM OF SKIN: ICD-10-CM

## 2024-08-13 DIAGNOSIS — Z79.01 LONG TERM (CURRENT) USE OF ANTICOAGULANTS: ICD-10-CM

## 2024-08-13 DIAGNOSIS — E78.5 HYPERLIPIDEMIA, UNSPECIFIED: ICD-10-CM

## 2024-08-13 DIAGNOSIS — Z96.651 PRESENCE OF RIGHT ARTIFICIAL KNEE JOINT: Chronic | ICD-10-CM

## 2024-08-13 DIAGNOSIS — K42.9 UMBILICAL HERNIA WITHOUT OBSTRUCTION OR GANGRENE: ICD-10-CM

## 2024-08-13 DIAGNOSIS — Z86.718 PERSONAL HISTORY OF OTHER VENOUS THROMBOSIS AND EMBOLISM: ICD-10-CM

## 2024-08-13 PROCEDURE — 88302 TISSUE EXAM BY PATHOLOGIST: CPT

## 2024-08-13 PROCEDURE — C1889: CPT

## 2024-08-13 PROCEDURE — C1781: CPT

## 2024-08-13 PROCEDURE — 49616 RPR AA HRN RCR 3-10 NCR/STRN: CPT

## 2024-08-13 PROCEDURE — 88302 TISSUE EXAM BY PATHOLOGIST: CPT | Mod: 26

## 2024-08-13 RX ORDER — RIVAROXABAN 15 MG-20MG
1 KIT ORAL
Refills: 0 | DISCHARGE

## 2024-08-13 RX ORDER — OXYCODONE HYDROCHLORIDE 30 MG/1
1 TABLET ORAL
Qty: 20 | Refills: 0
Start: 2024-08-13 | End: 2024-08-17

## 2024-08-13 RX ORDER — OXYCODONE HYDROCHLORIDE 30 MG/1
5 TABLET ORAL ONCE
Refills: 0 | Status: DISCONTINUED | OUTPATIENT
Start: 2024-08-13 | End: 2024-08-13

## 2024-08-13 RX ORDER — LORATADINE 10 MG
17 TABLET,DISINTEGRATING ORAL
Qty: 1 | Refills: 0
Start: 2024-08-13 | End: 2024-08-17

## 2024-08-13 RX ORDER — CYANOCOBALAMIN/FOLIC AC/VIT B6 2-2.5-25MG
1 TABLET ORAL
Refills: 0 | DISCHARGE

## 2024-08-13 RX ORDER — OMEPRAZOLE 100 %
1 POWDER (GRAM) MISCELLANEOUS
Refills: 0 | DISCHARGE

## 2024-08-13 RX ADMIN — FENTANYL CITRATE 50 MICROGRAM(S): 1200 LOZENGE ORAL; TRANSMUCOSAL at 14:20

## 2024-08-13 RX ADMIN — OXYCODONE HYDROCHLORIDE 5 MILLIGRAM(S): 30 TABLET ORAL at 14:20

## 2024-08-13 RX ADMIN — OXYCODONE HYDROCHLORIDE 5 MILLIGRAM(S): 30 TABLET ORAL at 14:33

## 2024-08-13 RX ADMIN — FENTANYL CITRATE 50 MICROGRAM(S): 1200 LOZENGE ORAL; TRANSMUCOSAL at 14:33

## 2024-08-13 NOTE — ASU DISCHARGE PLAN (ADULT/PEDIATRIC) - ASU DC SPECIAL INSTRUCTIONSFT
Please contact Dr Adrian's office to schedule follow up appointment in 2weeks.    -Keep dressings in place for 48hrs. Showers allowed. Lather and gentle cleanse area, no abrasive rubbing/scrubbing.  -Keep abdominal binder on for 24hrs post procedure. Please contact Dr Adrian's office to schedule follow up appointment in 2weeks.  -RESUME XARELTO tomorrow 8/14/2024 per Dr Adrian.  -Keep dressings in place for 48hrs. Showers allowed. Lather and gentle cleanse area, no abrasive rubbing/scrubbing.  -Keep abdominal binder on for 24hrs post procedure.

## 2024-08-13 NOTE — ASU DISCHARGE PLAN (ADULT/PEDIATRIC) - NURSING INSTRUCTIONS
For any problems or concerns,contact your doctor. [facility] patients should call the [facility]. If you cannot reach the doctor or clinic, call [facility] Emergency Department at [phone] or go to your local Emergency Department.  A responsible adult should be with you for the rest of the day and night for your safety and to help you if you needed. Resume your medications as listed on the attached Medication Record. Begin with liquids and light food ( tea, toast, Jello, soups). Advance to what you normally eat. Liquids should taken in adequate amounts today.     CALL the DOCTOR:    -Fever greater than  101F  - Signs  of infection such as : increase pain,swelling,redness,or a bad  smell coming from the wound.  -Excessive amount of bleeding.  - Any pain that appears to be getting worse.  - Vomiting  -  If you have  not urinated 8 hours after surgery or have any difficulty urinating.     A responsible adult should be with you for the rest of the day and night for your safety and to help you if you needed.    Review attached FACT SHEET if applicable.

## 2024-08-13 NOTE — ASU DISCHARGE PLAN (ADULT/PEDIATRIC) - CARE PROVIDER_API CALL
Jaquan Adrian (DO)  Surgery  [address] [place] [id number]  Phone: [phone]  Fax: [phone]  Follow Up Time: 2 weeks

## 2024-08-13 NOTE — ASU DISCHARGE PLAN (ADULT/PEDIATRIC) - PAIN MANAGEMENT
Prescription given to patient/guardian/Take over the counter pain medication/Prescriptions electronically submitted to pharmacy from [facility]

## 2024-08-13 NOTE — BRIEF OPERATIVE NOTE - NSICDXBRIEFPROCEDURE_GEN_ALL_CORE_FT
PROCEDURES:  Repair, internal hernia, laparoscopic 13-Aug-2024 14:05:57 Laproscopic incarcerated umbilical hernia repair with MESH placement. Orlando Heath

## 2024-08-16 LAB — SURGICAL PATHOLOGY STUDY: SIGNIFICANT CHANGE UP

## 2024-08-20 NOTE — ED ADULT TRIAGE NOTE - PAIN: PRESENCE, MLM
[Feeling Fatigued] : feeling fatigued [Constipation] : constipation [Joint Pain] : joint pain [Joint Stiffness] : joint stiffness denies pain/discomfort [Negative] : Heme/Lymph [FreeTextEntry8] : difficulty urinating

## 2024-08-28 ENCOUNTER — APPOINTMENT (OUTPATIENT)
Dept: SURGERY | Facility: CLINIC | Age: 64
End: 2024-08-28

## 2024-08-28 VITALS
SYSTOLIC BLOOD PRESSURE: 128 MMHG | HEIGHT: 73 IN | DIASTOLIC BLOOD PRESSURE: 84 MMHG | OXYGEN SATURATION: 98 % | WEIGHT: 262 LBS | TEMPERATURE: 98.3 F | HEART RATE: 88 BPM | BODY MASS INDEX: 34.72 KG/M2

## 2024-08-28 DIAGNOSIS — Z87.898 PERSONAL HISTORY OF OTHER SPECIFIED CONDITIONS: ICD-10-CM

## 2024-08-28 PROCEDURE — 99024 POSTOP FOLLOW-UP VISIT: CPT

## 2024-08-28 NOTE — PHYSICAL EXAM
[Abdomen Tenderness] : ~T ~M No abdominal tenderness [de-identified] : NAD [de-identified] : wounds healing well.

## 2024-09-23 NOTE — ED PROVIDER NOTE - PATIENT PORTAL LINK FT
Report received from off going nurse. Patient AAO. Currently c/o right lower extremity pain and abdominal pain, requiring PRN dilaudid q 4hours. Call light in reach. Bed low and locked. Will continue plan of care.    You can access the FollowMyHealth Patient Portal offered by [facility] by registering at the following website: [email or web address]. By joining [facility]’s FollowMyHealth portal, you will also be able to view your health information using other applications (apps) compatible with our system.

## 2024-11-02 ENCOUNTER — OUTPATIENT (OUTPATIENT)
Dept: OUTPATIENT SERVICES | Facility: HOSPITAL | Age: 64
LOS: 1 days | Discharge: HOME | End: 2024-11-02

## 2024-11-02 DIAGNOSIS — Z98.890 OTHER SPECIFIED POSTPROCEDURAL STATES: Chronic | ICD-10-CM

## 2024-11-02 DIAGNOSIS — D64.9 ANEMIA, UNSPECIFIED: ICD-10-CM

## 2024-11-02 DIAGNOSIS — Z96.652 PRESENCE OF LEFT ARTIFICIAL KNEE JOINT: Chronic | ICD-10-CM

## 2024-11-02 DIAGNOSIS — Z96.651 PRESENCE OF RIGHT ARTIFICIAL KNEE JOINT: Chronic | ICD-10-CM

## 2024-11-06 ENCOUNTER — APPOINTMENT (OUTPATIENT)
Dept: HEMATOLOGY ONCOLOGY | Facility: CLINIC | Age: 64
End: 2024-11-06

## 2024-12-25 NOTE — PHYSICAL THERAPY INITIAL EVALUATION ADULT - WEIGHT-BEARING RESTRICTIONS: GAIT, REHAB EVAL
[address] [facility]  [address] [facility] [address]  [address] [address]  Dept: [phone]  Dept Fax: [phone]    Cherelle Drew is a 26 Nataliia Rodriguez is a 22 y.o. female on day 7 of admission presenting with ICAO (internal carotid artery occlusion), bilateral.    Subjective   Patient chart reviewed and updated insulin recs given per A&P below, DMT is doing virtual service today on 12/25 for holiday coverage. Physical exam and ROS from 12/24 encounter.      I have reviewed histories, allergies and medications have been reviewed and there are no changes       Objective   Review of Systems   Constitutional:  Positive for activity change, appetite change and fatigue. Negative for diaphoresis and unexpected weight change.   HENT:  Negative for congestion, sore throat and trouble swallowing.    Eyes:  Negative for pain, redness and visual disturbance.   Respiratory:  Negative for cough, chest tightness and shortness of breath.    Cardiovascular:  Negative for chest pain, palpitations and leg swelling.   Gastrointestinal:  Negative for abdominal pain, diarrhea, nausea and vomiting.   Endocrine: Negative for cold intolerance, heat intolerance, polydipsia, polyphagia and polyuria.   Genitourinary:  Negative for dysuria, frequency and urgency.   Musculoskeletal:  Negative for gait problem and joint swelling.   Skin:  Negative for pallor and rash.   Allergic/Immunologic: Negative for immunocompromised state.   Neurological:  Positive for weakness. Negative for dizziness, light-headedness, numbness and headaches.   Hematological:  Does not bruise/bleed easily.   Psychiatric/Behavioral:  Positive for decreased concentration. Negative for agitation, behavioral problems and confusion.    All other systems reviewed and are negative.    Physical Exam  Vitals reviewed.   Constitutional:       General: She is not in acute distress.     Appearance: Normal appearance.   HENT:      Head: Normocephalic and atraumatic.      Comments: Crainotomy scar     Nose: Nose normal.      Mouth/Throat:      Mouth: Mucous membranes are moist.   Eyes:      Extraocular Movements:  "Extraocular movements intact.      Conjunctiva/sclera: Conjunctivae normal.      Pupils: Pupils are equal, round, and reactive to light.   Cardiovascular:      Pulses: Normal pulses.   Pulmonary:      Effort: Pulmonary effort is normal. No respiratory distress.   Abdominal:      General: Abdomen is flat. There is no distension.   Musculoskeletal:         General: Normal range of motion.   Skin:     General: Skin is warm and dry.      Coloration: Skin is pale.      Findings: No rash.   Neurological:      Mental Status: She is alert and oriented to person, place, and time.   Psychiatric:         Mood and Affect: Mood normal.         Behavior: Behavior normal.       Last Recorded Vitals  Blood pressure 111/63, pulse 82, temperature 36.4 °C (97.5 °F), temperature source Temporal, resp. rate 11, height 1.448 m (4' 9\"), weight 46.1 kg (101 lb 10.1 oz), last menstrual period 11/21/2024, SpO2 100%.  Intake/Output last 3 Shifts:  I/O last 3 completed shifts:  In: 5227.2 (113.4 mL/kg) [P.O.:240; I.V.:4112.1 (89.2 mL/kg); IV Piggyback:875.1]  Out: 3242 (70.3 mL/kg) [Urine:1150 (0.7 mL/kg/hr); Drains:130; Other:1962]  Weight: 46.1 kg     Relevant Results  Results from last 7 days   Lab Units 12/25/24  0604 12/25/24  0603 12/25/24  0203 12/25/24  0006 12/24/24  2208 12/24/24  1953 12/24/24  1822 12/24/24  0203 12/24/24  0133 12/23/24  2150 12/23/24  2054 12/23/24  0634 12/23/24  0210   POCT GLUCOSE mg/dL  --  143* 126* 128* 127* 98  --    < >  --    < >  --    < >  --    GLUCOSE mg/dL 133*  --   --   --   --   --  121*  --  178*  --  132*  --  77    < > = values in this interval not displayed.       Assessment/Plan   Assessment & Plan  ICAO (internal carotid artery occlusion), bilateral    Type 1 diabetes mellitus with hypoglycemia and without coma    Labile blood glucose    Diabetes History  Type of diabetes: 1  Year diagnosed or age: 1yo  Hospitalizations for DKA or HHS: DKA occurrences per [facility]HB/anion gap lab review: 11/2024, " 5/2024,   Complications: ESRD, DVT, TIA  Seen by PCP or Endocrinology:  Endocrinology, Dr. Reyes last seen 7/2024  Frequency of glucose checks: 5+ daily per Dexcom G7 CGM  Glucose review: labile glucose this admission, most recently last night due to treatment of post-prandial glucose after late dinner with delayed insulin delivery from pharmacy  Frequency of Hypoglycemia: occasional, 2 readings <70mg/dL this admission                   Hypoglycemia unawareness: no      Home Medications  Basal: glargine 10u  Prandial: aspart 1u:10g ICR  Correction: aspart 1u:50>150mg/dL ssi  CGM: dexcom g7       CGM INTERPRETATION:  Average blood sugar 216 mg/dL, glucose management indicator 8.5%     Glucose less than 70 mg/dL equals 1% of time worn  Glucose ranging between 70 to 180 mg/dL represented by 39% of time worn  Glucose ranging greater than 180 mg/dL represented by 60% of time worn     72 hours of data reviewed in order to inform diabetes treatment plan decision making, patient is not currently at risk for recurrent hypoglycemia safety concerns     PLAN  Steroids: n/a  Nutrition: PO 75g CHO/meal     - glargine increased back to 10u qAM now that patient is PO       If NPO: please reduce to 8u qAM    -continue lispro 4u with meals (hold if NPO)  -continue lispro 3u with bedtime snack PRN (hold if NPO)     - continue lispro corrective scale to #1 with meals and at bedtime, adjust to q4h if NPO or if persistently hyperglycemic >300mg/dL    = 0u  151-200 = 1u  201-250 = 2u  251-300 = 3u  301-350 = 4u  351-400 = 5u  Over 400 = 5u every 4hr       -Accuchecks (not BMP) ACHS  - Goal -180  -Hypoglycemia protocol  -Will continue to follow and titrate insulin accordingly     Discharge planning:   [] patient may expect to discharge home on glargine and lispro, final doses TBD by titration  [x]continue use of Dexcom G7  []will enroll pt in  pharmacy platinum plan program  [] recommend referral to [facility]  Health  []follow up with  endocrinology Dr. Reyes 5/2025, may bridge to outpt endo in [facility] [place], operated by [facility] hospital discharge clinic    I spent 50 minutes in the professional and overall care of this patient.      Delmi Chavarria PA-C     full weight-bearing

## 2025-01-28 NOTE — HISTORY OF PRESENT ILLNESS
Health Maintenance Due   Topic Date Due    Meningococcal Vaccine (1 - Risk 2-dose series) Never done    Hepatitis A Vaccine (1 of 2 - Risk 2-dose series) Never done    Shingles Vaccine (1 of 2) Never done    Colorectal Cancer Screen  Never done    Hepatitis B Vaccine (For Physician/APC Discussion) (1 of 3 - Risk 3-dose series) Never done    Respiratory Syncytial Virus (RSV) Vaccine 60+ (1 - Risk 60-74 years 1-dose series) Never done    Pneumococcal Vaccine 50+ (2 of 2 - PCV) 10/14/2021    Influenza Vaccine (1) 09/01/2024    COVID-19 Vaccine (4 - 2024-25 season) 09/01/2024    Diabetes A1C  01/09/2025       Endocrine topics addressed today. A1c performed in clinic today.    [8] : 8 [5] : 5 [Sharp] : sharp [Constant] : constant [4] : 4 [Orthovisc] : Orthovisc [de-identified] : 5/9/23: Here to f/up right knee. CSI at last visit with only temporary relief. Reports right knee pain can be intense at times, limiting his ADLs.  Is following with a vascular specialist s/p DVT. \par 4/27/23: Here to f/up right knee. Reports after last visit he went for the doppler which did show a DVT. Was put on Xarelto and Coumadin. \par 4/6/23: Follow up for his right knee. States pain started again 3 days ago and was severe. Also c/o swelling in the leg. H/O DVT in 2015, just came off Xarelto 3 months ago. \par 1/12/23: Here for fu orthovisc #4\par 01/05/2023: Here to f/up right knee and Orthovisc # 3\par 12/29/2022: Here to f/up right knee and Orthovisc # 2\par 12/22/2022: Here to f/up right knee and Orthovisc # 1\par 12/09/2022 Mr. JAYLYN MAYER, a 62 year old male, presents today for right knee. Seen in [facility] on 11.05.22, received a csi with temporary relief. Reports continued and worsening right knee pain and stiffness. Pain with walking and increased activity. \par hx of left TKA\par Retired [] : no [FreeTextEntry1] : right knee  [FreeTextEntry5] : 05/09/23: Pt feeling pain in the Rt Knee. Pt felt temporary improvement with the Inj. Walking limited. ROM limited. \par 04/27/23: Pt feeling improvement with CSI and Asp. Swelling reduced. Pt has dvt and was started on Xalrelto 15mg twice a day 3 weeks ago. Pt has been taking meloxicam once a day and Coumadin 3 mg once a day. \par Pt feeling a slight improvement on the Rt knee since monday but still feels uncomfortable.   [de-identified] : left tka [de-identified] : 01/05/23 [TWNoteComboBox1] : 20%

## 2025-05-10 PROBLEM — Z86.718 HISTORY OF DEEP VENOUS THROMBOSIS: Status: RESOLVED | Noted: 2018-05-17 | Resolved: 2025-05-10

## 2025-05-12 ENCOUNTER — OUTPATIENT (OUTPATIENT)
Dept: OUTPATIENT SERVICES | Facility: HOSPITAL | Age: 65
LOS: 1 days | Discharge: HOME | End: 2025-05-12

## 2025-05-12 DIAGNOSIS — Z98.890 OTHER SPECIFIED POSTPROCEDURAL STATES: Chronic | ICD-10-CM

## 2025-05-12 DIAGNOSIS — Z96.651 PRESENCE OF RIGHT ARTIFICIAL KNEE JOINT: Chronic | ICD-10-CM

## 2025-05-12 DIAGNOSIS — Z96.652 PRESENCE OF LEFT ARTIFICIAL KNEE JOINT: Chronic | ICD-10-CM

## 2025-05-12 DIAGNOSIS — D64.9 ANEMIA, UNSPECIFIED: ICD-10-CM

## 2025-05-13 ENCOUNTER — APPOINTMENT (OUTPATIENT)
Dept: HEMATOLOGY ONCOLOGY | Facility: CLINIC | Age: 65
End: 2025-05-13
Payer: MEDICARE

## 2025-05-13 VITALS
TEMPERATURE: 97.8 F | OXYGEN SATURATION: 92 % | BODY MASS INDEX: 35.63 KG/M2 | WEIGHT: 270.07 LBS | SYSTOLIC BLOOD PRESSURE: 115 MMHG | DIASTOLIC BLOOD PRESSURE: 77 MMHG | HEART RATE: 87 BPM | RESPIRATION RATE: 16 BRPM

## 2025-05-13 DIAGNOSIS — I82.401 ACUTE EMBOLISM AND THROMBOSIS OF UNSPECIFIED DEEP VEINS OF RIGHT LOWER EXTREMITY: ICD-10-CM

## 2025-05-13 DIAGNOSIS — Z86.718 PERSONAL HISTORY OF OTHER VENOUS THROMBOSIS AND EMBOLISM: ICD-10-CM

## 2025-05-13 DIAGNOSIS — I82.91 CHRONIC EMBOLISM AND THROMBOSIS OF UNSPECIFIED VEIN: ICD-10-CM

## 2025-05-13 PROCEDURE — G2211 COMPLEX E/M VISIT ADD ON: CPT

## 2025-05-13 PROCEDURE — 99214 OFFICE O/P EST MOD 30 MIN: CPT

## 2025-05-13 RX ORDER — TAMSULOSIN HYDROCHLORIDE 0.4 MG/1
0.4 CAPSULE ORAL
Refills: 0 | Status: ACTIVE | COMMUNITY

## 2025-07-01 ENCOUNTER — APPOINTMENT (OUTPATIENT)
Dept: CT IMAGING | Facility: CLINIC | Age: 65
End: 2025-07-01

## 2025-07-01 ENCOUNTER — OUTPATIENT (OUTPATIENT)
Dept: OUTPATIENT SERVICES | Facility: HOSPITAL | Age: 65
LOS: 1 days | End: 2025-07-01
Payer: MEDICARE

## 2025-07-01 DIAGNOSIS — Z00.00 ENCOUNTER FOR GENERAL ADULT MEDICAL EXAMINATION WITHOUT ABNORMAL FINDINGS: ICD-10-CM

## 2025-07-01 DIAGNOSIS — Z98.890 OTHER SPECIFIED POSTPROCEDURAL STATES: Chronic | ICD-10-CM

## 2025-07-01 DIAGNOSIS — Z96.651 PRESENCE OF RIGHT ARTIFICIAL KNEE JOINT: Chronic | ICD-10-CM

## 2025-07-01 DIAGNOSIS — Z96.652 PRESENCE OF LEFT ARTIFICIAL KNEE JOINT: Chronic | ICD-10-CM

## 2025-07-01 PROCEDURE — 71250 CT THORAX DX C-: CPT | Mod: MH

## 2025-07-01 PROCEDURE — 71250 CT THORAX DX C-: CPT | Mod: 26

## 2025-07-24 ENCOUNTER — APPOINTMENT (OUTPATIENT)
Dept: NUCLEAR MEDICINE | Facility: IMAGING CENTER | Age: 65
End: 2025-07-24
Payer: MEDICARE

## 2025-07-24 ENCOUNTER — OUTPATIENT (OUTPATIENT)
Dept: OUTPATIENT SERVICES | Facility: HOSPITAL | Age: 65
LOS: 1 days | End: 2025-07-24
Payer: MEDICARE

## 2025-07-24 DIAGNOSIS — Z98.890 OTHER SPECIFIED POSTPROCEDURAL STATES: Chronic | ICD-10-CM

## 2025-07-24 DIAGNOSIS — Z00.8 ENCOUNTER FOR OTHER GENERAL EXAMINATION: ICD-10-CM

## 2025-07-24 DIAGNOSIS — Z96.652 PRESENCE OF LEFT ARTIFICIAL KNEE JOINT: Chronic | ICD-10-CM

## 2025-07-24 DIAGNOSIS — Z96.651 PRESENCE OF RIGHT ARTIFICIAL KNEE JOINT: Chronic | ICD-10-CM

## 2025-07-24 PROCEDURE — A9552: CPT

## 2025-07-24 PROCEDURE — 78815 PET IMAGE W/CT SKULL-THIGH: CPT | Mod: 26,PI

## 2025-07-24 PROCEDURE — 78815 PET IMAGE W/CT SKULL-THIGH: CPT | Mod: MH

## 2025-07-30 ENCOUNTER — RESULT REVIEW (OUTPATIENT)
Age: 65
End: 2025-07-30

## 2025-07-30 ENCOUNTER — APPOINTMENT (OUTPATIENT)
Dept: THORACIC SURGERY | Facility: CLINIC | Age: 65
End: 2025-07-30
Payer: MEDICARE

## 2025-07-30 VITALS
WEIGHT: 260 LBS | DIASTOLIC BLOOD PRESSURE: 86 MMHG | OXYGEN SATURATION: 93 % | HEART RATE: 61 BPM | BODY MASS INDEX: 33.37 KG/M2 | HEIGHT: 74 IN | SYSTOLIC BLOOD PRESSURE: 141 MMHG

## 2025-07-30 DIAGNOSIS — R59.1 GENERALIZED ENLARGED LYMPH NODES: ICD-10-CM

## 2025-07-30 PROCEDURE — 99205 OFFICE O/P NEW HI 60 MIN: CPT

## 2025-07-30 RX ORDER — BUDESONIDE, GLYCOPYRROLATE, AND FORMOTEROL FUMARATE 160; 9; 4.8 UG/1; UG/1; UG/1
160-9-4.8 AEROSOL, METERED RESPIRATORY (INHALATION)
Refills: 0 | Status: ACTIVE | COMMUNITY

## 2025-08-07 ENCOUNTER — APPOINTMENT (OUTPATIENT)
Dept: MRI IMAGING | Facility: CLINIC | Age: 65
End: 2025-08-07
Payer: MEDICARE

## 2025-08-07 PROCEDURE — 70553 MRI BRAIN STEM W/O & W/DYE: CPT

## 2025-08-07 PROCEDURE — A9585: CPT | Mod: JZ

## 2025-08-12 ENCOUNTER — APPOINTMENT (OUTPATIENT)
Dept: PULMONOLOGY | Facility: CLINIC | Age: 65
End: 2025-08-12
Payer: MEDICARE

## 2025-08-12 VITALS
DIASTOLIC BLOOD PRESSURE: 81 MMHG | HEIGHT: 74 IN | BODY MASS INDEX: 33.37 KG/M2 | WEIGHT: 260 LBS | RESPIRATION RATE: 16 BRPM | OXYGEN SATURATION: 92 % | HEART RATE: 81 BPM | TEMPERATURE: 97.4 F | SYSTOLIC BLOOD PRESSURE: 139 MMHG

## 2025-08-12 DIAGNOSIS — R91.8 OTHER NONSPECIFIC ABNORMAL FINDING OF LUNG FIELD: ICD-10-CM

## 2025-08-12 PROCEDURE — 99205 OFFICE O/P NEW HI 60 MIN: CPT

## 2025-08-13 ENCOUNTER — NON-APPOINTMENT (OUTPATIENT)
Age: 65
End: 2025-08-13

## 2025-08-14 ENCOUNTER — OUTPATIENT (OUTPATIENT)
Dept: OUTPATIENT SERVICES | Facility: HOSPITAL | Age: 65
LOS: 1 days | End: 2025-08-14

## 2025-08-14 VITALS
DIASTOLIC BLOOD PRESSURE: 83 MMHG | HEART RATE: 82 BPM | SYSTOLIC BLOOD PRESSURE: 130 MMHG | OXYGEN SATURATION: 94 % | HEIGHT: 71 IN | RESPIRATION RATE: 20 BRPM | TEMPERATURE: 99 F | WEIGHT: 259.93 LBS

## 2025-08-14 DIAGNOSIS — R91.8 OTHER NONSPECIFIC ABNORMAL FINDING OF LUNG FIELD: ICD-10-CM

## 2025-08-14 DIAGNOSIS — Z98.890 OTHER SPECIFIED POSTPROCEDURAL STATES: Chronic | ICD-10-CM

## 2025-08-14 DIAGNOSIS — Z96.651 PRESENCE OF RIGHT ARTIFICIAL KNEE JOINT: Chronic | ICD-10-CM

## 2025-08-14 DIAGNOSIS — Z96.652 PRESENCE OF LEFT ARTIFICIAL KNEE JOINT: Chronic | ICD-10-CM

## 2025-08-17 LAB
ALBUMIN SERPL ELPH-MCNC: 4.2 G/DL
ALP BLD-CCNC: 119 U/L
ALT SERPL-CCNC: 13 U/L
ANION GAP SERPL CALC-SCNC: 13 MMOL/L
APTT BLD: 31.1 SEC
AST SERPL-CCNC: 15 U/L
BASOPHILS # BLD AUTO: 0.04 K/UL
BASOPHILS NFR BLD AUTO: 0.5 %
BILIRUB SERPL-MCNC: 0.2 MG/DL
BUN SERPL-MCNC: 8 MG/DL
CALCIUM SERPL-MCNC: 9 MG/DL
CHLORIDE SERPL-SCNC: 102 MMOL/L
CO2 SERPL-SCNC: 24 MMOL/L
CREAT SERPL-MCNC: 0.75 MG/DL
EGFRCR SERPLBLD CKD-EPI 2021: 100 ML/MIN/1.73M2
EOSINOPHIL # BLD AUTO: 0.15 K/UL
EOSINOPHIL NFR BLD AUTO: 1.8 %
GLUCOSE SERPL-MCNC: 104 MG/DL
HCT VFR BLD CALC: 42.1 %
HGB BLD-MCNC: 13.2 G/DL
IMM GRANULOCYTES NFR BLD AUTO: 0.4 %
INR PPP: 0.97 RATIO
LYMPHOCYTES # BLD AUTO: 1.89 K/UL
LYMPHOCYTES NFR BLD AUTO: 22.2 %
MAN DIFF?: NORMAL
MCHC RBC-ENTMCNC: 30 PG
MCHC RBC-ENTMCNC: 31.4 G/DL
MCV RBC AUTO: 95.7 FL
MONOCYTES # BLD AUTO: 0.95 K/UL
MONOCYTES NFR BLD AUTO: 11.1 %
NEUTROPHILS # BLD AUTO: 5.47 K/UL
NEUTROPHILS NFR BLD AUTO: 64 %
PLATELET # BLD AUTO: 292 K/UL
POTASSIUM SERPL-SCNC: 4.3 MMOL/L
PROT SERPL-MCNC: 6.9 G/DL
PT BLD: 11.5 SEC
RBC # BLD: 4.4 M/UL
RBC # FLD: 14.5 %
SODIUM SERPL-SCNC: 140 MMOL/L
WBC # FLD AUTO: 8.53 K/UL

## 2025-08-19 ENCOUNTER — APPOINTMENT (OUTPATIENT)
Dept: CARDIOLOGY | Facility: CLINIC | Age: 65
End: 2025-08-19
Payer: MEDICARE

## 2025-08-19 VITALS
OXYGEN SATURATION: 94 % | HEART RATE: 86 BPM | WEIGHT: 260 LBS | HEIGHT: 74 IN | DIASTOLIC BLOOD PRESSURE: 79 MMHG | SYSTOLIC BLOOD PRESSURE: 144 MMHG | RESPIRATION RATE: 20 BRPM | BODY MASS INDEX: 33.37 KG/M2

## 2025-08-19 DIAGNOSIS — E78.5 HYPERLIPIDEMIA, UNSPECIFIED: ICD-10-CM

## 2025-08-19 DIAGNOSIS — Z01.818 ENCOUNTER FOR OTHER PREPROCEDURAL EXAMINATION: ICD-10-CM

## 2025-08-19 DIAGNOSIS — R94.31 ABNORMAL ELECTROCARDIOGRAM [ECG] [EKG]: ICD-10-CM

## 2025-08-19 PROCEDURE — 99204 OFFICE O/P NEW MOD 45 MIN: CPT

## 2025-08-19 PROCEDURE — 93000 ELECTROCARDIOGRAM COMPLETE: CPT

## 2025-08-20 ENCOUNTER — NON-APPOINTMENT (OUTPATIENT)
Age: 65
End: 2025-08-20

## 2025-08-22 ENCOUNTER — TRANSCRIPTION ENCOUNTER (OUTPATIENT)
Age: 65
End: 2025-08-22

## 2025-08-22 ENCOUNTER — OUTPATIENT (OUTPATIENT)
Dept: INPATIENT UNIT | Facility: HOSPITAL | Age: 65
LOS: 1 days | End: 2025-08-22
Payer: MEDICARE

## 2025-08-22 ENCOUNTER — RESULT REVIEW (OUTPATIENT)
Age: 65
End: 2025-08-22

## 2025-08-22 ENCOUNTER — APPOINTMENT (OUTPATIENT)
Dept: PULMONOLOGY | Facility: HOSPITAL | Age: 65
End: 2025-08-22

## 2025-08-22 VITALS
HEART RATE: 80 BPM | OXYGEN SATURATION: 95 % | SYSTOLIC BLOOD PRESSURE: 116 MMHG | DIASTOLIC BLOOD PRESSURE: 68 MMHG | RESPIRATION RATE: 16 BRPM

## 2025-08-22 VITALS
SYSTOLIC BLOOD PRESSURE: 119 MMHG | DIASTOLIC BLOOD PRESSURE: 76 MMHG | WEIGHT: 259.93 LBS | OXYGEN SATURATION: 95 % | HEART RATE: 72 BPM | HEIGHT: 71 IN | TEMPERATURE: 98 F | RESPIRATION RATE: 16 BRPM

## 2025-08-22 DIAGNOSIS — Z96.651 PRESENCE OF RIGHT ARTIFICIAL KNEE JOINT: Chronic | ICD-10-CM

## 2025-08-22 DIAGNOSIS — Z98.890 OTHER SPECIFIED POSTPROCEDURAL STATES: Chronic | ICD-10-CM

## 2025-08-22 DIAGNOSIS — R91.8 OTHER NONSPECIFIC ABNORMAL FINDING OF LUNG FIELD: ICD-10-CM

## 2025-08-22 DIAGNOSIS — Z96.652 PRESENCE OF LEFT ARTIFICIAL KNEE JOINT: Chronic | ICD-10-CM

## 2025-08-22 LAB
B PERT IGG+IGM PNL SER: ABNORMAL
COLOR FLD: ABNORMAL
EOSINOPHIL # FLD: 0 % — SIGNIFICANT CHANGE UP
FLUID INTAKE SUBSTANCE CLASS: SIGNIFICANT CHANGE UP
FOLATE+VIT B12 SERBLD-IMP: 0 % — SIGNIFICANT CHANGE UP
LYMPHOCYTES # FLD: 1 % — SIGNIFICANT CHANGE UP
MESOTHL CELL # FLD: 0 % — SIGNIFICANT CHANGE UP
MONOS+MACROS # FLD: 6 % — SIGNIFICANT CHANGE UP
NEUTROPHILS-BODY FLUID: 50 % — SIGNIFICANT CHANGE UP
OTHER CELLS FLD MANUAL: 43 % — SIGNIFICANT CHANGE UP
RCV VOL RI: SIGNIFICANT CHANGE UP CELLS/UL
SPECIMEN SOURCE FLD: SIGNIFICANT CHANGE UP
TOTAL CELLS COUNTED, BODY FLUID: 100 CELLS — SIGNIFICANT CHANGE UP
TOTAL NUCLEATED CELL COUNT, BODY FLUID: SIGNIFICANT CHANGE UP CELLS/UL
TUBE TYPE: SIGNIFICANT CHANGE UP
WBC COUNT.: SIGNIFICANT CHANGE UP CELLS/UL

## 2025-08-22 PROCEDURE — 31645 BRNCHSC W/THER ASPIR 1ST: CPT | Mod: GC

## 2025-08-22 PROCEDURE — 88341 IMHCHEM/IMCYTCHM EA ADD ANTB: CPT | Mod: 26,59

## 2025-08-22 PROCEDURE — 31627 NAVIGATIONAL BRONCHOSCOPY: CPT | Mod: GC

## 2025-08-22 PROCEDURE — 31624 DX BRONCHOSCOPE/LAVAGE: CPT | Mod: GC

## 2025-08-22 PROCEDURE — 31653 BRONCH EBUS SAMPLNG 3/> NODE: CPT | Mod: GC

## 2025-08-22 PROCEDURE — 88112 CYTOPATH CELL ENHANCE TECH: CPT | Mod: 26,59

## 2025-08-22 PROCEDURE — 31623 DX BRONCHOSCOPE/BRUSH: CPT | Mod: GC

## 2025-08-22 PROCEDURE — 31654 BRONCH EBUS IVNTJ PERPH LES: CPT | Mod: GC

## 2025-08-22 PROCEDURE — 88342 IMHCHEM/IMCYTCHM 1ST ANTB: CPT | Mod: 26,59

## 2025-08-22 PROCEDURE — 88305 TISSUE EXAM BY PATHOLOGIST: CPT | Mod: 26

## 2025-08-22 PROCEDURE — 88173 CYTOPATH EVAL FNA REPORT: CPT | Mod: 26

## 2025-08-22 PROCEDURE — 31629 BRONCHOSCOPY/NEEDLE BX EACH: CPT | Mod: GC

## 2025-08-22 PROCEDURE — 31628 BRONCHOSCOPY/LUNG BX EACH: CPT | Mod: GC

## 2025-08-22 DEVICE — SYS GALAXY BRONCHOSCOPE SINGLE USE: Type: IMPLANTABLE DEVICE | Status: FUNCTIONAL

## 2025-08-22 RX ORDER — ONDANSETRON HCL/PF 4 MG/2 ML
4 VIAL (ML) INJECTION ONCE
Refills: 0 | Status: ACTIVE | OUTPATIENT
Start: 2025-08-22 | End: 2026-07-21

## 2025-08-22 RX ORDER — TAMSULOSIN HYDROCHLORIDE 0.4 MG/1
2 CAPSULE ORAL
Refills: 0 | DISCHARGE

## 2025-08-22 RX ORDER — ACETAMINOPHEN 500 MG/5ML
1000 LIQUID (ML) ORAL ONCE
Refills: 0 | Status: ACTIVE | OUTPATIENT
Start: 2025-08-22 | End: 2026-07-21

## 2025-08-22 RX ORDER — RIVAROXABAN 10 MG/1
1 TABLET, FILM COATED ORAL
Refills: 0 | DISCHARGE

## 2025-08-22 RX ORDER — OMEPRAZOLE 20 MG/1
1 CAPSULE, DELAYED RELEASE ORAL
Refills: 0 | DISCHARGE

## 2025-08-22 RX ORDER — HYDROMORPHONE/SOD CHLOR,ISO/PF 2 MG/10 ML
0.5 SYRINGE (ML) INJECTION
Refills: 0 | Status: DISCONTINUED | OUTPATIENT
Start: 2025-08-22 | End: 2025-08-22

## 2025-08-22 RX ORDER — ATORVASTATIN CALCIUM 80 MG/1
1 TABLET, FILM COATED ORAL
Refills: 0 | DISCHARGE

## 2025-08-22 RX ORDER — SODIUM CHLORIDE 9 G/1000ML
1000 INJECTION, SOLUTION INTRAVENOUS
Refills: 0 | Status: ACTIVE | OUTPATIENT
Start: 2025-08-22 | End: 2026-07-21

## 2025-08-23 ENCOUNTER — RESULT REVIEW (OUTPATIENT)
Age: 65
End: 2025-08-23

## 2025-08-23 LAB
GRAM STN FLD: SIGNIFICANT CHANGE UP
NIGHT BLUE STAIN TISS: SIGNIFICANT CHANGE UP
SPECIMEN SOURCE: SIGNIFICANT CHANGE UP
SPECIMEN SOURCE: SIGNIFICANT CHANGE UP

## 2025-08-23 PROCEDURE — 71045 X-RAY EXAM CHEST 1 VIEW: CPT | Mod: 26

## 2025-08-24 LAB
CULTURE RESULTS: SIGNIFICANT CHANGE UP
SPECIMEN SOURCE: SIGNIFICANT CHANGE UP

## 2025-08-25 ENCOUNTER — NON-APPOINTMENT (OUTPATIENT)
Age: 65
End: 2025-08-25

## 2025-08-29 LAB — NON-GYNECOLOGICAL CYTOLOGY STUDY: SIGNIFICANT CHANGE UP

## 2025-09-04 ENCOUNTER — APPOINTMENT (OUTPATIENT)
Dept: PULMONOLOGY | Facility: CLINIC | Age: 65
End: 2025-09-04
Payer: MEDICARE

## 2025-09-04 VITALS
WEIGHT: 258 LBS | HEIGHT: 72 IN | DIASTOLIC BLOOD PRESSURE: 74 MMHG | TEMPERATURE: 97.1 F | SYSTOLIC BLOOD PRESSURE: 120 MMHG | HEART RATE: 73 BPM | BODY MASS INDEX: 34.95 KG/M2 | OXYGEN SATURATION: 92 % | RESPIRATION RATE: 16 BRPM

## 2025-09-04 DIAGNOSIS — R91.8 OTHER NONSPECIFIC ABNORMAL FINDING OF LUNG FIELD: ICD-10-CM

## 2025-09-04 PROBLEM — C34.90 SQUAMOUS CELL CARCINOMA LUNG: Status: ACTIVE | Noted: 2025-09-04

## 2025-09-04 LAB — NIGHT BLUE STAIN TISS: ABNORMAL

## 2025-09-04 PROCEDURE — 99214 OFFICE O/P EST MOD 30 MIN: CPT

## 2025-09-10 ENCOUNTER — APPOINTMENT (OUTPATIENT)
Dept: THORACIC SURGERY | Facility: CLINIC | Age: 65
End: 2025-09-10
Payer: MEDICARE

## 2025-09-10 VITALS
SYSTOLIC BLOOD PRESSURE: 132 MMHG | HEIGHT: 72 IN | HEART RATE: 69 BPM | DIASTOLIC BLOOD PRESSURE: 79 MMHG | WEIGHT: 216 LBS | BODY MASS INDEX: 29.26 KG/M2 | RESPIRATION RATE: 17 BRPM | OXYGEN SATURATION: 91 %

## 2025-09-10 DIAGNOSIS — C34.90 MALIGNANT NEOPLASM OF UNSPECIFIED PART OF UNSPECIFIED BRONCHUS OR LUNG: ICD-10-CM

## 2025-09-10 PROCEDURE — 99215 OFFICE O/P EST HI 40 MIN: CPT

## (undated) DEVICE — Device

## (undated) DEVICE — BIOPSY FORCEP OLYMPUS ALLIGATOR 2.0MM

## (undated) DEVICE — CHEST DRAIN PLEUR-EVAC WET/WET ADULT-PEDS SINGLE (QUICK)

## (undated) DEVICE — TUBING SUCTION CONN 6FT STERILE

## (undated) DEVICE — SYR LUER LOK 3CC

## (undated) DEVICE — ELCTR GROUNDING PAD ADULT COVIDIEN

## (undated) DEVICE — SYR LUER LOK 10CC

## (undated) DEVICE — TUBING SUCTION 20FT

## (undated) DEVICE — DRSG CURITY GAUZE SPONGE 4 X 4" 12-PLY

## (undated) DEVICE — DRAPE 3/4 SHEET 52X76"

## (undated) DEVICE — NDL ASPIRATION 21G

## (undated) DEVICE — VALVE SUCTION EVIS 160/200/240

## (undated) DEVICE — TRAP SPECIMEN SPUTUM 40CC

## (undated) DEVICE — PACK BRONCHOSCOPY

## (undated) DEVICE — BIOPSY FORCEP J&J MONARCH SMOOTH CUP

## (undated) DEVICE — SYR SLIP 10CC

## (undated) DEVICE — NDL ARCHPOINT PULMONARY 21G

## (undated) DEVICE — FILTERLINE NASAL O2 CO2 ADLT

## (undated) DEVICE — BRONCHOSCOPE GALAXY CONN IRR ASPIR TUBE SCP GUIDE

## (undated) DEVICE — NDL ASPIRATION VIZISHOT2 21G

## (undated) DEVICE — FOLEY HOLDER STATLOCK 2 WAY ADULT

## (undated) DEVICE — VENODYNE/SCD SLEEVE CALF MEDIUM

## (undated) DEVICE — MASK O2 NON REBREATH 3IN1 ADULT

## (undated) DEVICE — FORCEP RADIAL JAW 4 PEDIATRIC W NDL 1.8MM 2MM 160CM DISP

## (undated) DEVICE — BRUSH CYTO ENDO

## (undated) DEVICE — SUCTION CATH ARGYLE WHISTLE TIP 14FR STRAIGHT PACKED

## (undated) DEVICE — GOWN TRIMAX LG

## (undated) DEVICE — DRAPE TOWEL BLUE 17" X 24"

## (undated) DEVICE — SUCTION YANKAUER NO CONTROL VENT

## (undated) DEVICE — SYR LUER LOK 50CC

## (undated) DEVICE — FILTERLINE ET TUBE PED/ADLT ETCO2

## (undated) DEVICE — BALLOON SINGLE FOR BF-UC160F

## (undated) DEVICE — WARMING BLANKET LOWER ADULT

## (undated) DEVICE — FORCEP BIOPSY BRONCHOSCOPE DISP

## (undated) DEVICE — SOL INJ NS 0.9% 250ML

## (undated) DEVICE — NDL ASPIRATION 22G W SYR

## (undated) DEVICE — NDL HYPO SAFE 18G X 1.5" (PINK)

## (undated) DEVICE — SOL INJ NS 0.9% 500ML 1-PORT

## (undated) DEVICE — VALVE BIOPSY BRONCHOVIDEOSCOPE